# Patient Record
Sex: FEMALE | Race: WHITE | Employment: OTHER | URBAN - METROPOLITAN AREA
[De-identification: names, ages, dates, MRNs, and addresses within clinical notes are randomized per-mention and may not be internally consistent; named-entity substitution may affect disease eponyms.]

---

## 2017-01-08 DIAGNOSIS — E78.00 HYPERCHOLESTEREMIA: ICD-10-CM

## 2017-01-09 RX ORDER — ATORVASTATIN CALCIUM 10 MG/1
TABLET, FILM COATED ORAL
Qty: 30 TAB | Refills: 0 | Status: SHIPPED | OUTPATIENT
Start: 2017-01-09 | End: 2017-02-09 | Stop reason: SDUPTHER

## 2017-01-14 DIAGNOSIS — M81.0 OSTEOPOROSIS: ICD-10-CM

## 2017-01-16 RX ORDER — ALENDRONATE SODIUM 70 MG/1
TABLET ORAL
Qty: 4 TAB | Refills: 0 | Status: SHIPPED | OUTPATIENT
Start: 2017-01-16 | End: 2017-02-19 | Stop reason: SDUPTHER

## 2017-01-25 ENCOUNTER — HOSPITAL ENCOUNTER (OUTPATIENT)
Dept: LAB | Age: 67
Discharge: HOME | End: 2017-01-25

## 2017-01-25 ENCOUNTER — OFFICE VISIT (OUTPATIENT)
Dept: FAMILY MEDICINE CLINIC | Age: 67
End: 2017-01-25

## 2017-01-25 VITALS
OXYGEN SATURATION: 99 % | HEART RATE: 98 BPM | HEIGHT: 61 IN | BODY MASS INDEX: 55.32 KG/M2 | TEMPERATURE: 97.5 F | DIASTOLIC BLOOD PRESSURE: 81 MMHG | SYSTOLIC BLOOD PRESSURE: 149 MMHG | RESPIRATION RATE: 16 BRPM | WEIGHT: 293 LBS

## 2017-01-25 DIAGNOSIS — M25.512 CHRONIC LEFT SHOULDER PAIN: ICD-10-CM

## 2017-01-25 DIAGNOSIS — M81.0 OSTEOPOROSIS: ICD-10-CM

## 2017-01-25 DIAGNOSIS — I10 HTN (HYPERTENSION), BENIGN: Primary | ICD-10-CM

## 2017-01-25 DIAGNOSIS — G89.29 CHRONIC LEFT SHOULDER PAIN: ICD-10-CM

## 2017-01-25 DIAGNOSIS — R73.01 IFG (IMPAIRED FASTING GLUCOSE): ICD-10-CM

## 2017-01-25 DIAGNOSIS — F32.89 OTHER DEPRESSION: ICD-10-CM

## 2017-01-25 DIAGNOSIS — K21.9 GASTROESOPHAGEAL REFLUX DISEASE WITHOUT ESOPHAGITIS: ICD-10-CM

## 2017-01-25 DIAGNOSIS — E78.00 HYPERCHOLESTEREMIA: ICD-10-CM

## 2017-01-25 PROCEDURE — 99001 SPECIMEN HANDLING PT-LAB: CPT | Performed by: INTERNAL MEDICINE

## 2017-01-25 RX ORDER — ACETAMINOPHEN AND CODEINE PHOSPHATE 300; 30 MG/1; MG/1
1 TABLET ORAL
Qty: 40 TAB | Refills: 0 | Status: SHIPPED | OUTPATIENT
Start: 2017-01-25 | End: 2018-06-27

## 2017-01-25 NOTE — PROGRESS NOTES
Shawn Albright is a 77 y.o.  female and presents with     Chief Complaint   Patient presents with    Hypertension    Cholesterol Problem    GERD    Osteoporosis    Shoulder Pain    Wax in Ear     Pt has shoulder pain on the left side. Pt got injection in her left shoulder and that helped. Pt is taking her blood pressure and chol meds. Pt is taking meds as directed. Pt has rt hip pain  And is requesting soemting for pain. Pt does have ear wax and wants it cleaned. Past Medical History   Diagnosis Date    Degenerative disk disease     Depression     Depression     Diffuse idiopathic skeletal hyperostosis     Fracture of leg     Gallstone     Lymphedema of leg 1975     bilateral lower legs    Morbid obesity ([address]     Pulmonary arterial hypertension (HCC)     MANPREET (stress urinary incontinence, female)      Past Surgical History   Procedure Laterality Date    Hx hysterectomy      Hx orthopaedic       right shoulder and right knee replaced x 2, left knee replaced as well    Hx knee replacement       Current Outpatient Prescriptions   Medication Sig    acetaminophen-codeine (TYLENOL-CODEINE #3) 300-30 mg per tablet Take 1 Tab by mouth every six (6) hours as needed for Pain. Max Daily Amount: 4 Tabs.  alendronate (FOSAMAX) 70 mg tablet TAKE ONE TABLET BY MOUTH ONCE A WEEK ON  SUNDAY    atorvastatin (LIPITOR) 10 mg tablet TAKE ONE TABLET BY MOUTH ONCE DAILY    oxybutynin (DITROPAN) 5 mg tablet TAKE ONE TABLET BY MOUTH ONCE DAILY    ergocalciferol (ERGOCALCIFEROL) 50,000 unit capsule Take 1 Cap by mouth every seven (7) days.  furosemide (LASIX) 20 mg tablet 1 po daily    pantoprazole (PROTONIX) 40 mg tablet Take 1 Tab by mouth daily.  DULoxetine (CYMBALTA) 60 mg capsule Take 1 Cap by mouth daily.  metFORMIN (GLUCOPHAGE) 1,000 mg tablet Take 1 Tab by mouth two (2) times daily (with meals).     albuterol (PROVENTIL HFA, VENTOLIN HFA, PROAIR HFA) 90 mcg/actuation inhaler Take 2 Puffs by inhalation every six (6) hours as needed for Wheezing.  losartan (COZAAR) 25 mg tablet Take 1 Tab by mouth daily.  potassium chloride (K-DUR, KLOR-CON) 10 mEq tablet Take 1 Tab by mouth daily. No current facility-administered medications for this visit. Health Maintenance   Topic Date Due    GLAUCOMA SCREENING Q2Y  07/09/2017    Pneumococcal 65+ Low/Medium Risk (2 of 2 - PCV13) 07/26/2017    MEDICARE YEARLY EXAM  07/27/2017    BREAST CANCER SCRN MAMMOGRAM  12/29/2018    COLONOSCOPY  07/25/2023    DTaP/Tdap/Td series (2 - Td) 07/09/2025    Hepatitis C Screening  Completed    OSTEOPOROSIS SCREENING (DEXA)  Completed    ZOSTER VACCINE AGE 60>  Completed    INFLUENZA AGE 9 TO ADULT  Completed     Immunization History   Administered Date(s) Administered    Influenza High Dose Vaccine PF 10/19/2015, 08/12/2016    Pneumococcal Polysaccharide (PPSV-23) 07/09/2015, 07/26/2016    Tdap 07/09/2015     No LMP recorded. Patient has had a hysterectomy. Allergies and Intolerances: Allergies   Allergen Reactions    Adhesive Other (comments)     Skin burns - red spots    Lisinopril Other (comments)    Nitrofurantoin Other (comments)       Family History:   Family History   Problem Relation Age of Onset    Heart Disease Father     Diabetes Father        Social History:   She  reports that she has never smoked. She has never used smokeless tobacco.  She  reports that she does not drink alcohol.             Review of Systems:   General: negative for - chills, fatigue, fever, weight change  Psych: negative for - anxiety, depression, irritability or mood swings  ENT: negative for - headaches, hearing change, nasal congestion, oral lesions, sneezing or sore throat  Heme/ Lymph: negative for - bleeding problems, bruising, pallor or swollen lymph nodes  Endo: negative for - hot flashes, polydipsia/polyuria or temperature intolerance  Resp: negative for - cough, shortness of breath or wheezing  CV: negative for - chest pain, edema or palpitations  GI: negative for - abdominal pain, change in bowel habits, constipation, diarrhea or nausea/vomiting  : negative for - dysuria, hematuria, incontinence, pelvic pain or vulvar/vaginal symptoms  MSK: negative for - joint pain, joint swelling or muscle pain, pos for hip pain  Neuro: negative for - confusion, headaches, seizures or weakness  Derm: negative for - dry skin, hair changes, rash or skin lesion changes          Physical:   Vitals:   Vitals:    01/25/17 0854 01/25/17 0857   BP: 150/81 149/81   Pulse: 98    Resp: 16    Temp: 97.5 °F (36.4 °C)    TempSrc: Oral    SpO2: 99%    Weight: 312 lb (141.5 kg)    Height: 5' 1\" (1.549 m)            Exam:   HEENT- atraumatic,normocephalic, awake, oriented, well nourished, bilateral ear wax  Neck - supple,no enlarged lymph nodes, no JVD, no thyromegaly  Chest- CTA, no rhonchi, no crackles  Heart- rrr, no murmurs / gallop/rub  Abdomen- soft,BS+,NT, no hepatosplenomegaly  Ext - no c/c/edema   Neuro- no focal deficits. Power 5/5 all extremities  Skin - warm,dry, no obvious rashes. Review of Data:   LABS:   Lab Results   Component Value Date/Time    WBC 6.2 07/26/2016 08:45 AM    HGB 12.4 07/26/2016 08:45 AM    HCT 38.4 07/26/2016 08:45 AM    PLATELET 536 95/06/4445 08:45 AM     Lab Results   Component Value Date/Time    Sodium 144 07/26/2016 08:45 AM    Potassium 4.3 07/26/2016 08:45 AM    Chloride 103 07/26/2016 08:45 AM    CO2 24 07/26/2016 08:45 AM    Glucose 78 07/26/2016 08:45 AM    BUN 12 07/26/2016 08:45 AM    Creatinine 0.71 07/26/2016 08:45 AM     Lab Results   Component Value Date/Time    Cholesterol, total 169 07/26/2016 08:45 AM    HDL Cholesterol 76 07/26/2016 08:45 AM    LDL, calculated 69 07/26/2016 08:45 AM    Triglyceride 122 07/26/2016 08:45 AM     No results found for: GPT        Impression / Plan:        ICD-10-CM ICD-9-CM    1.  HTN (hypertension), benign L57 875.8 METABOLIC PANEL, COMPREHENSIVE      LIPID PANEL      CBC WITH AUTOMATED DIFF   2. Gastroesophageal reflux disease without esophagitis K21.9 530.81    3. Hypercholesteremia E78.00 272.0    4. Other depression F32.89     5. Osteoporosis M81.0 733.00 DEXA BONE DENSITY STUDY AXIAL   6. IFG (impaired fasting glucose) R73.01 790.21 HEMOGLOBIN A1C WITH EAG   7. Chronic left shoulder pain M25.512 719.41 acetaminophen-codeine (TYLENOL-CODEINE #3) 300-30 mg per tablet    G89.29 338.29      Bilateral ear wax -  Will do ear lavage next visit. HTN/GERD / hyperchol - stable    Explained to patient risk benefits of the medications. Advised patient to stop meds if having any side effects. Pt verbalized understanding of the instructions. I have discussed the diagnosis with the patient and the intended plan as seen in the above orders. The patient has received an after-visit summary and questions were answered concerning future plans. I have discussed medication side effects and warnings with the patient as well. I have reviewed the plan of care with the patient, accepted their input and they are in agreement with the treatment goals. Reviewed plan of care. Patient has provided input and agrees with goals.     Follow-up Disposition: Not on Carlos Murrieta MD

## 2017-01-25 NOTE — PROGRESS NOTES
1. Have you been to the ER, urgent care clinic since your last visit? Hospitalized since your last visit? No    2. Have you seen or consulted any other health care providers outside of the [address] [place] [address] since your last visit? Include any pap smears or colon screening.  No

## 2017-01-25 NOTE — MR AVS SNAPSHOT
Visit Information Date & Time Provider Department Dept. Phone Encounter #  
 1/25/2017  9:00 AM [id number], [address] [facility] [phone] [id number] Follow-up Instructions Return in about 1 week (around 2/1/2017) for ear lavage. Upcoming Health Maintenance Date Due  
 GLAUCOMA SCREENING Q2Y 7/9/2017 Pneumococcal 65+ Low/Medium Risk (2 of 2 - PCV13) 7/26/2017 MEDICARE YEARLY EXAM 7/27/2017 BREAST CANCER SCRN MAMMOGRAM 12/29/2018 COLONOSCOPY 7/25/2023 DTaP/Tdap/Td series (2 - Td) 7/9/2025 Allergies as of 1/25/2017  Review Complete On: 1/25/2017 By: Mali Abernathy MD  
  
 Severity Noted Reaction Type Reactions Adhesive  11/24/2015    Other (comments) Skin burns - red spots Lisinopril  07/09/2015    Other (comments) Nitrofurantoin  05/13/2016    Other (comments) Current Immunizations  Never Reviewed Name Date Influenza High Dose Vaccine PF 8/12/2016, 10/19/2015 Pneumococcal Polysaccharide (PPSV-23) 7/26/2016  8:30 AM, 7/9/2015  9:00 AM  
 Tdap 7/9/2015  9:00 AM  
  
 Not reviewed this visit You Were Diagnosed With   
  
 Codes Comments HTN (hypertension), benign    -  Primary ICD-10-CM: I10 
ICD-9-CM: 401.1 Gastroesophageal reflux disease without esophagitis     ICD-10-CM: K21.9 ICD-9-CM: 530.81 Hypercholesteremia     ICD-10-CM: E78.00 ICD-9-CM: 272.0 Other depression     ICD-10-CM: F32.89 Osteoporosis     ICD-10-CM: M81.0 ICD-9-CM: 733.00 IFG (impaired fasting glucose)     ICD-10-CM: R73.01 
ICD-9-CM: 790.21 Chronic left shoulder pain     ICD-10-CM: M25.512, G89.29 ICD-9-CM: 719.41, 338.29 Vitals BP Pulse Temp Resp Height(growth percentile) Weight(growth percentile) 149/81 (BP 1 Location: Left arm, BP Patient Position: Sitting) 98 97.5 °F (36.4 °C) (Oral) 16 5' 1\" (1.549 m) 312 lb (141.5 kg) SpO2 BMI OB Status Smoking Status 99% 58.95 kg/m2 Hysterectomy Never Smoker Vitals History BMI and BSA Data Body Mass Index Body Surface Area 58.95 kg/m 2 2.47 m 2 Preferred Pharmacy Pharmacy Name [facility] [address], [address] [address] [phone] Your Updated Medication List  
  
   
This list is accurate as of: 1/25/17  9:40 AM.  Always use your most recent med list.  
  
  
  
  
 acetaminophen-codeine 300-30 mg per tablet Commonly known as:  TYLENOL-CODEINE #3 Take 1 Tab by mouth every six (6) hours as needed for Pain. Max Daily Amount: 4 Tabs. albuterol 90 mcg/actuation inhaler Commonly known as:  PROVENTIL HFA, VENTOLIN HFA, PROAIR HFA Take 2 Puffs by inhalation every six (6) hours as needed for Wheezing. alendronate 70 mg tablet Commonly known as:  FOSAMAX TAKE ONE TABLET BY MOUTH ONCE A WEEK ON  SUNDAY  
  
 atorvastatin 10 mg tablet Commonly known as:  LIPITOR  
TAKE ONE TABLET BY MOUTH ONCE DAILY DULoxetine 60 mg capsule Commonly known as:  CYMBALTA Take 1 Cap by mouth daily. ergocalciferol 50,000 unit capsule Commonly known as:  ERGOCALCIFEROL Take 1 Cap by mouth every seven (7) days. furosemide 20 mg tablet Commonly known as:  LASIX  
1 po daily  
  
 losartan 25 mg tablet Commonly known as:  COZAAR Take 1 Tab by mouth daily. metFORMIN 1,000 mg tablet Commonly known as:  GLUCOPHAGE Take 1 Tab by mouth two (2) times daily (with meals). oxybutynin 5 mg tablet Commonly known as:  DITROPAN  
TAKE ONE TABLET BY MOUTH ONCE DAILY pantoprazole 40 mg tablet Commonly known as:  PROTONIX Take 1 Tab by mouth daily. potassium chloride 10 mEq tablet Commonly known as:  K-DUR, KLOR-CON Take 1 Tab by mouth daily. Prescriptions Printed  Refills  
 acetaminophen-codeine (TYLENOL-CODEINE #3) 300-30 mg per tablet 0  
 Sig: Take 1 Tab by mouth every six (6) hours as needed for Pain. Max Daily Amount: 4 Tabs. Class: Print Route: Oral  
  
Follow-up Instructions Return in about 1 week (around 2/1/2017) for ear lavage. To-Do List   
 01/25/2017 Lab:  CBC WITH AUTOMATED DIFF   
  
 01/25/2017 Imaging:  DEXA BONE DENSITY STUDY AXIAL   
  
 01/25/2017 Lab:  HEMOGLOBIN A1C WITH EAG   
  
 01/25/2017 Lab:  LIPID PANEL   
  
 01/25/2017 Lab:  METABOLIC PANEL, COMPREHENSIVE [facility] SERVICES! Dear Kashmir Harris: 
Thank you for requesting a [employer] account. Our records indicate that you already have an active [employer] account. You can access your account anytime at [email or web address] Did you know that you can access your hospital and ER discharge instructions at any time in [employer]? You can also review all of your test results from your hospital stay or ER visit. Additional Information If you have questions, please visit the Frequently Asked Questions section of the [employer] website at [email or web address] Remember, [employer] is NOT to be used for urgent needs. For medical emergencies, dial 911. Now available from your iPhone and Android! Please provide this summary of care documentation to your next provider. Your primary care clinician is listed as Jasen Caba. If you have any questions after today's visit, please call [phone].

## 2017-01-26 LAB
ALBUMIN SERPL-MCNC: 4.2 G/DL (ref 3.6–4.8)
ALBUMIN/GLOB SERPL: 1.7 {RATIO} (ref 1.1–2.5)
ALP SERPL-CCNC: 78 IU/L (ref 39–117)
ALT SERPL-CCNC: 13 IU/L (ref 0–32)
AST SERPL-CCNC: 21 IU/L (ref 0–40)
BASOPHILS # BLD AUTO: 0 X10E3/UL (ref 0–0.2)
BASOPHILS NFR BLD AUTO: 1 %
BILIRUB SERPL-MCNC: 0.9 MG/DL (ref 0–1.2)
BUN SERPL-MCNC: 11 MG/DL (ref 8–27)
BUN/CREAT SERPL: 16 (ref 11–26)
CALCIUM SERPL-MCNC: 9.7 MG/DL (ref 8.7–10.3)
CHLORIDE SERPL-SCNC: 100 MMOL/L (ref 96–106)
CHOLEST SERPL-MCNC: 179 MG/DL (ref 100–199)
CO2 SERPL-SCNC: 24 MMOL/L (ref 18–29)
CREAT SERPL-MCNC: 0.7 MG/DL (ref 0.57–1)
EOSINOPHIL # BLD AUTO: 0.1 X10E3/UL (ref 0–0.4)
EOSINOPHIL NFR BLD AUTO: 2 %
ERYTHROCYTE [DISTWIDTH] IN BLOOD BY AUTOMATED COUNT: 14.8 % (ref 12.3–15.4)
EST. AVERAGE GLUCOSE BLD GHB EST-MCNC: 117 MG/DL
GLOBULIN SER CALC-MCNC: 2.5 G/DL (ref 1.5–4.5)
GLUCOSE SERPL-MCNC: 75 MG/DL (ref 65–99)
HBA1C MFR BLD: 5.7 % (ref 4.8–5.6)
HCT VFR BLD AUTO: 40.2 % (ref 34–46.6)
HDLC SERPL-MCNC: 75 MG/DL
HGB BLD-MCNC: 13.3 G/DL (ref 11.1–15.9)
IMM GRANULOCYTES # BLD: 0 X10E3/UL (ref 0–0.1)
IMM GRANULOCYTES NFR BLD: 0 %
INTERPRETATION, 910389: NORMAL
LDLC SERPL CALC-MCNC: 78 MG/DL (ref 0–99)
LYMPHOCYTES # BLD AUTO: 1.1 X10E3/UL (ref 0.7–3.1)
LYMPHOCYTES NFR BLD AUTO: 21 %
MCH RBC QN AUTO: 29.9 PG (ref 26.6–33)
MCHC RBC AUTO-ENTMCNC: 33.1 G/DL (ref 31.5–35.7)
MCV RBC AUTO: 90 FL (ref 79–97)
MONOCYTES # BLD AUTO: 0.6 X10E3/UL (ref 0.1–0.9)
MONOCYTES NFR BLD AUTO: 10 %
NEUTROPHILS # BLD AUTO: 3.5 X10E3/UL (ref 1.4–7)
NEUTROPHILS NFR BLD AUTO: 66 %
PLATELET # BLD AUTO: 227 X10E3/UL (ref 150–379)
POTASSIUM SERPL-SCNC: 4.5 MMOL/L (ref 3.5–5.2)
PROT SERPL-MCNC: 6.7 G/DL (ref 6–8.5)
RBC # BLD AUTO: 4.45 X10E6/UL (ref 3.77–5.28)
SODIUM SERPL-SCNC: 144 MMOL/L (ref 134–144)
TRIGL SERPL-MCNC: 130 MG/DL (ref 0–149)
VLDLC SERPL CALC-MCNC: 26 MG/DL (ref 5–40)
WBC # BLD AUTO: 5.3 X10E3/UL (ref 3.4–10.8)

## 2017-02-01 ENCOUNTER — OFFICE VISIT (OUTPATIENT)
Dept: FAMILY MEDICINE CLINIC | Age: 67
End: 2017-02-01

## 2017-02-01 VITALS
WEIGHT: 293 LBS | HEIGHT: 61 IN | TEMPERATURE: 96.8 F | SYSTOLIC BLOOD PRESSURE: 141 MMHG | RESPIRATION RATE: 18 BRPM | HEART RATE: 63 BPM | OXYGEN SATURATION: 98 % | DIASTOLIC BLOOD PRESSURE: 74 MMHG | BODY MASS INDEX: 55.32 KG/M2

## 2017-02-01 DIAGNOSIS — H61.23 EXCESSIVE EAR WAX, BILATERAL: Primary | ICD-10-CM

## 2017-02-01 DIAGNOSIS — M79.89 LEG SWELLING: Primary | ICD-10-CM

## 2017-02-01 RX ORDER — FUROSEMIDE 40 MG/1
TABLET ORAL
Qty: 30 TAB | Refills: 3 | Status: SHIPPED | OUTPATIENT
Start: 2017-02-01 | End: 2019-05-16 | Stop reason: SDUPTHER

## 2017-02-01 NOTE — PROGRESS NOTES
Araceli Brand is a 79 y.o.  female and presents with     Chief Complaint   Patient presents with    Wax in Ear     Pt goes to Audiology. Pt was asked to get her ears flushed. Pt uses hearing aids. Past Medical History   Diagnosis Date    Degenerative disk disease     Depression     Depression     Diffuse idiopathic skeletal hyperostosis     Fracture of leg     Gallstone     Lymphedema of leg 1975     bilateral lower legs    Morbid obesity ([address]     Pulmonary arterial hypertension (HCC)     MANPREET (stress urinary incontinence, female)      Past Surgical History   Procedure Laterality Date    Hx hysterectomy      Hx orthopaedic       right shoulder and right knee replaced x 2, left knee replaced as well    Hx knee replacement       Current Outpatient Prescriptions   Medication Sig    alendronate (FOSAMAX) 70 mg tablet TAKE ONE TABLET BY MOUTH ONCE A WEEK ON  SUNDAY    atorvastatin (LIPITOR) 10 mg tablet TAKE ONE TABLET BY MOUTH ONCE DAILY    potassium chloride (K-DUR, KLOR-CON) 10 mEq tablet Take 1 Tab by mouth daily.  oxybutynin (DITROPAN) 5 mg tablet TAKE ONE TABLET BY MOUTH ONCE DAILY    ergocalciferol (ERGOCALCIFEROL) 50,000 unit capsule Take 1 Cap by mouth every seven (7) days.  furosemide (LASIX) 20 mg tablet 1 po daily    pantoprazole (PROTONIX) 40 mg tablet Take 1 Tab by mouth daily.  DULoxetine (CYMBALTA) 60 mg capsule Take 1 Cap by mouth daily.  metFORMIN (GLUCOPHAGE) 1,000 mg tablet Take 1 Tab by mouth two (2) times daily (with meals).  albuterol (PROVENTIL HFA, VENTOLIN HFA, PROAIR HFA) 90 mcg/actuation inhaler Take 2 Puffs by inhalation every six (6) hours as needed for Wheezing.  losartan (COZAAR) 25 mg tablet Take 1 Tab by mouth daily.  acetaminophen-codeine (TYLENOL-CODEINE #3) 300-30 mg per tablet Take 1 Tab by mouth every six (6) hours as needed for Pain. Max Daily Amount: 4 Tabs. No current facility-administered medications for this visit. Health Maintenance   Topic Date Due    GLAUCOMA SCREENING Q2Y  07/09/2017    Pneumococcal 65+ Low/Medium Risk (2 of 2 - PCV13) 07/26/2017    MEDICARE YEARLY EXAM  07/27/2017    BREAST CANCER SCRN MAMMOGRAM  12/29/2018    COLONOSCOPY  07/25/2023    DTaP/Tdap/Td series (2 - Td) 07/09/2025    Hepatitis C Screening  Completed    OSTEOPOROSIS SCREENING (DEXA)  Completed    ZOSTER VACCINE AGE 60>  Completed    INFLUENZA AGE 9 TO ADULT  Completed     Immunization History   Administered Date(s) Administered    Influenza High Dose Vaccine PF 10/19/2015, 08/12/2016    Pneumococcal Polysaccharide (PPSV-23) 07/09/2015, 07/26/2016    Tdap 07/09/2015     No LMP recorded. Patient has had a hysterectomy. Allergies and Intolerances: Allergies   Allergen Reactions    Adhesive Other (comments)     Skin burns - red spots    Lisinopril Other (comments)    Nitrofurantoin Other (comments)       Family History:   Family History   Problem Relation Age of Onset    Heart Disease Father     Diabetes Father        Social History:   She  reports that she has never smoked. She has never used smokeless tobacco.  She  reports that she does not drink alcohol.             Review of Systems: pos for ear wax  General: negative for - chills, fatigue, fever, weight change  Psych: negative for - anxiety, depression, irritability or mood swings  ENT: negative for - headaches, hearing change, nasal congestion, oral lesions, sneezing or sore throat  Heme/ Lymph: negative for - bleeding problems, bruising, pallor or swollen lymph nodes  Endo: negative for - hot flashes, polydipsia/polyuria or temperature intolerance  Resp: negative for - cough, shortness of breath or wheezing  CV: negative for - chest pain, edema or palpitations  GI: negative for - abdominal pain, change in bowel habits, constipation, diarrhea or nausea/vomiting  : negative for - dysuria, hematuria, incontinence, pelvic pain or vulvar/vaginal symptoms  MSK: negative for - joint pain, joint swelling or muscle pain  Neuro: negative for - confusion, headaches, seizures or weakness  Derm: negative for - dry skin, hair changes, rash or skin lesion changes          Physical:   Vitals:   Vitals:    02/01/17 0814   BP: 141/74   Pulse: 63   Resp: 18   Temp: 96.8 °F (36 °C)   TempSrc: Oral   SpO2: 98%   Weight: 311 lb (141.1 kg)   Height: 5' 1\" (1.549 m)           Exam:   HEENT- atraumatic,normocephalic, awake, oriented, well nourished, some bilateral ear wax  Neck - supple,no enlarged lymph nodes, no JVD, no thyromegaly  Chest- CTA, no rhonchi, no crackles  Heart- rrr, no murmurs / gallop/rub  Abdomen- soft,BS+,NT, no hepatosplenomegaly  Ext - no c/c/edema   Neuro- no focal deficits. Power 5/5 all extremities  Skin - warm,dry, no obvious rashes. Review of Data:   LABS:   Lab Results   Component Value Date/Time    WBC 5.3 01/25/2017 09:59 AM    HGB 13.3 01/25/2017 09:59 AM    HCT 40.2 01/25/2017 09:59 AM    PLATELET 244 89/01/1375 09:59 AM     Lab Results   Component Value Date/Time    Sodium 144 01/25/2017 09:59 AM    Potassium 4.5 01/25/2017 09:59 AM    Chloride 100 01/25/2017 09:59 AM    CO2 24 01/25/2017 09:59 AM    Glucose 75 01/25/2017 09:59 AM    BUN 11 01/25/2017 09:59 AM    Creatinine 0.70 01/25/2017 09:59 AM     Lab Results   Component Value Date/Time    Cholesterol, total 179 01/25/2017 09:59 AM    HDL Cholesterol 75 01/25/2017 09:59 AM    LDL, calculated 78 01/25/2017 09:59 AM    Triglyceride 130 01/25/2017 09:59 AM     No results found for: GPT        Impression / Plan:        ICD-10-CM ICD-9-CM    1. Excessive ear wax, bilateral H61.23 380.4 REMOVE IMPACTED EAR WAX     After obtaining informed consent both ear were flushed with luke warm water and peroxide. . Small amounts of wax was flushed from both ears. Pt has scar tissue on both ear drums. Explained to patient risk benefits of the medications. Advised patient to stop meds if having any side effects. Pt verbalized understanding of the instructions. I have discussed the diagnosis with the patient and the intended plan as seen in the above orders. The patient has received an after-visit summary and questions were answered concerning future plans. I have discussed medication side effects and warnings with the patient as well. I have reviewed the plan of care with the patient, accepted their input and they are in agreement with the treatment goals. Reviewed plan of care. Patient has provided input and agrees with goals.     Follow-up Disposition: Not on Jonathan Boateng MD

## 2017-02-01 NOTE — PROGRESS NOTES
Patient presents to clinic for ear lavage. Patient states that she thinks her legs have been swelling more than usual.     Advance Directive:    1. Do you have an advance directive in place? Patient Reply:     2. If not, would you like material regarding how to put one in place? Patient Reply:      Coordination of Care:    1. Have you been to the ER, urgent care clinic since your last visit? Hospitalized since your last visit? No    2. Have you seen or consulted any other health care providers outside of the [address] [place] [address] since your last visit? Include any pap smears or colon screening. No     Depression Screening completed. Learning Assessment completed. Abuse Screening completed. Health Maintenance reviewed and discussed per provider.

## 2017-02-01 NOTE — MR AVS SNAPSHOT
Visit Information Date & Time Provider Department Dept. Phone Encounter #  
 2/1/2017  8:15 AM Neela Edwards, [address] [facility] [phone] [id number] Follow-up Instructions Return in about 3 months (around 5/1/2017). Upcoming Health Maintenance Date Due  
 GLAUCOMA SCREENING Q2Y 7/9/2017 Pneumococcal 65+ Low/Medium Risk (2 of 2 - PCV13) 7/26/2017 MEDICARE YEARLY EXAM 7/27/2017 BREAST CANCER SCRN MAMMOGRAM 12/29/2018 COLONOSCOPY 7/25/2023 DTaP/Tdap/Td series (2 - Td) 7/9/2025 Allergies as of 2/1/2017  Review Complete On: 2/1/2017 By: Neela Edwards MD  
  
 Severity Noted Reaction Type Reactions Adhesive  11/24/2015    Other (comments) Skin burns - red spots Lisinopril  07/09/2015    Other (comments) Nitrofurantoin  05/13/2016    Other (comments) Current Immunizations  Never Reviewed Name Date Influenza High Dose Vaccine PF 8/12/2016, 10/19/2015 Pneumococcal Polysaccharide (PPSV-23) 7/26/2016  8:30 AM, 7/9/2015  9:00 AM  
 Tdap 7/9/2015  9:00 AM  
  
 Not reviewed this visit You Were Diagnosed With   
  
 Codes Comments Excessive ear wax, bilateral    -  Primary ICD-10-CM: H61.23 
ICD-9-CM: 380.4 Vitals BP Pulse Temp Resp Height(growth percentile) Weight(growth percentile) 141/74 (BP 1 Location: Left arm, BP Patient Position: Sitting) 63 96.8 °F (36 °C) (Oral) 18 5' 1\" (1.549 m) 311 lb (141.1 kg) SpO2 BMI OB Status Smoking Status 98% 58.76 kg/m2 Hysterectomy Never Smoker Vitals History BMI and BSA Data Body Mass Index Body Surface Area 58.76 kg/m 2 2.46 m 2 Preferred Pharmacy Pharmacy Name Phone [facility] [address], [place] [address] [phone] Your Updated Medication List  
  
   
This list is accurate as of: 2/1/17  8:58 AM.  Always use your most recent med list.  
  
  
  
  
 acetaminophen-codeine 300-30 mg per tablet Commonly known as:  TYLENOL-CODEINE #3 Take 1 Tab by mouth every six (6) hours as needed for Pain. Max Daily Amount: 4 Tabs. albuterol 90 mcg/actuation inhaler Commonly known as:  PROVENTIL HFA, VENTOLIN HFA, PROAIR HFA Take 2 Puffs by inhalation every six (6) hours as needed for Wheezing. alendronate 70 mg tablet Commonly known as:  FOSAMAX TAKE ONE TABLET BY MOUTH ONCE A WEEK ON  SUNDAY  
  
 atorvastatin 10 mg tablet Commonly known as:  LIPITOR  
TAKE ONE TABLET BY MOUTH ONCE DAILY DULoxetine 60 mg capsule Commonly known as:  CYMBALTA Take 1 Cap by mouth daily. ergocalciferol 50,000 unit capsule Commonly known as:  ERGOCALCIFEROL Take 1 Cap by mouth every seven (7) days. furosemide 20 mg tablet Commonly known as:  LASIX  
1 po daily  
  
 losartan 25 mg tablet Commonly known as:  COZAAR Take 1 Tab by mouth daily. metFORMIN 1,000 mg tablet Commonly known as:  GLUCOPHAGE Take 1 Tab by mouth two (2) times daily (with meals). oxybutynin 5 mg tablet Commonly known as:  DITROPAN  
TAKE ONE TABLET BY MOUTH ONCE DAILY pantoprazole 40 mg tablet Commonly known as:  PROTONIX Take 1 Tab by mouth daily. potassium chloride 10 mEq tablet Commonly known as:  K-DUR, KLOR-CON Take 1 Tab by mouth daily. We Performed the Following REMOVE IMPACTED EAR WAX [68320 CPT(R)] Follow-up Instructions Return in about 3 months (around 5/1/2017). To-Do List   
 02/03/2017 2:30 PM  
  Appointment with [facility] BONE DENSITY RM 1 at [address] ([phone]) OUTSIDE FILMS  - Any outside films related to the study being scheduled should be brought with you on the day of the exam.  If this cannot be done there may be a delay in the reading of the study.   MEDICATIONS  - Patient must bring a complete list of all medications currently taking to include prescriptions, over-the-counter meds, herbals, vitamins & any dietary supplements - Patient must discontinue use of calcium, vitamins, or calcium supplements including antacids (calcium based) 24 hours before scan. CHECK IN INSTRUCTIONS  For DEXA/Bone Density Studies:  Check in to the [facility] 15 minutes prior to your appointment. [facility] [address] [place] [address], [facility] SERVICES! Dear Carrie George: 
Thank you for requesting a [employer] account. Our records indicate that you already have an active [employer] account. You can access your account anytime at [email or web address] Did you know that you can access your hospital and ER discharge instructions at any time in [employer]? You can also review all of your test results from your hospital stay or ER visit. Additional Information If you have questions, please visit the Frequently Asked Questions section of the [employer] website at [email or web address] Remember, [employer] is NOT to be used for urgent needs. For medical emergencies, dial 911. Now available from your iPhone and Android! Please provide this summary of care documentation to your next provider. Your primary care clinician is listed as Michelle Aguilera. If you have any questions after today's visit, please call [phone].

## 2017-02-03 ENCOUNTER — HOSPITAL ENCOUNTER (OUTPATIENT)
Dept: GENERAL RADIOLOGY | Age: 67
Discharge: HOME | End: 2017-02-03
Attending: INTERNAL MEDICINE
Payer: MEDICARE

## 2017-02-03 DIAGNOSIS — M81.0 OSTEOPOROSIS: ICD-10-CM

## 2017-02-03 PROCEDURE — 77080 DXA BONE DENSITY AXIAL: CPT

## 2017-02-09 DIAGNOSIS — E78.00 HYPERCHOLESTEREMIA: ICD-10-CM

## 2017-02-09 RX ORDER — ATORVASTATIN CALCIUM 10 MG/1
TABLET, FILM COATED ORAL
Qty: 30 TAB | Refills: 0 | Status: SHIPPED | OUTPATIENT
Start: 2017-02-09 | End: 2017-03-17 | Stop reason: SDUPTHER

## 2017-02-10 DIAGNOSIS — E78.00 HYPERCHOLESTEREMIA: ICD-10-CM

## 2017-02-12 RX ORDER — ATORVASTATIN CALCIUM 10 MG/1
TABLET, FILM COATED ORAL
Qty: 30 TAB | Refills: 0 | Status: SHIPPED | OUTPATIENT
Start: 2017-02-12 | End: 2017-04-13 | Stop reason: SDUPTHER

## 2017-02-17 DIAGNOSIS — I10 ESSENTIAL HYPERTENSION: ICD-10-CM

## 2017-02-17 NOTE — TELEPHONE ENCOUNTER
Requested Prescriptions     Pending Prescriptions Disp Refills    losartan (COZAAR) 25 mg tablet 30 Tab 3     Sig: Take 1 Tab by mouth daily.

## 2017-02-19 DIAGNOSIS — N39.41 URGE INCONTINENCE OF URINE: ICD-10-CM

## 2017-02-19 DIAGNOSIS — M81.0 OSTEOPOROSIS: ICD-10-CM

## 2017-02-19 DIAGNOSIS — I10 ESSENTIAL HYPERTENSION: ICD-10-CM

## 2017-02-19 RX ORDER — ALENDRONATE SODIUM 70 MG/1
TABLET ORAL
Qty: 4 TAB | Refills: 0 | Status: SHIPPED | OUTPATIENT
Start: 2017-02-19 | End: 2017-03-26 | Stop reason: SDUPTHER

## 2017-02-19 RX ORDER — OXYBUTYNIN CHLORIDE 5 MG/1
TABLET ORAL
Qty: 90 TAB | Refills: 0 | Status: SHIPPED | OUTPATIENT
Start: 2017-02-19 | End: 2017-04-13 | Stop reason: SDUPTHER

## 2017-02-19 RX ORDER — LOSARTAN POTASSIUM 25 MG/1
25 TABLET ORAL DAILY
Qty: 30 TAB | Refills: 3 | Status: SHIPPED | OUTPATIENT
Start: 2017-02-19 | End: 2017-04-21 | Stop reason: SDUPTHER

## 2017-02-19 RX ORDER — LOSARTAN POTASSIUM 25 MG/1
TABLET ORAL
Qty: 90 TAB | Refills: 0 | Status: SHIPPED | OUTPATIENT
Start: 2017-02-19 | End: 2019-05-16 | Stop reason: SDUPTHER

## 2017-03-17 DIAGNOSIS — E78.00 HYPERCHOLESTEREMIA: ICD-10-CM

## 2017-03-17 RX ORDER — ATORVASTATIN CALCIUM 10 MG/1
10 TABLET, FILM COATED ORAL DAILY
Qty: 30 TAB | Refills: 3 | Status: SHIPPED | OUTPATIENT
Start: 2017-03-17 | End: 2017-04-13 | Stop reason: SDUPTHER

## 2017-03-26 DIAGNOSIS — M81.0 OSTEOPOROSIS: ICD-10-CM

## 2017-03-27 RX ORDER — ALENDRONATE SODIUM 70 MG/1
TABLET ORAL
Qty: 4 TAB | Refills: 0 | Status: SHIPPED | OUTPATIENT
Start: 2017-03-27 | End: 2017-10-23 | Stop reason: SDUPTHER

## 2017-03-28 ENCOUNTER — TELEPHONE (OUTPATIENT)
Dept: FAMILY MEDICINE CLINIC | Age: 67
End: 2017-03-28

## 2017-03-28 DIAGNOSIS — N39.0 URINARY TRACT INFECTION WITHOUT HEMATURIA, SITE UNSPECIFIED: Primary | ICD-10-CM

## 2017-03-28 RX ORDER — AMOXICILLIN AND CLAVULANATE POTASSIUM 875; 125 MG/1; MG/1
1 TABLET, FILM COATED ORAL 2 TIMES DAILY
Qty: 10 TAB | Refills: 0 | Status: SHIPPED | OUTPATIENT
Start: 2017-03-28 | End: 2017-04-07

## 2017-03-28 NOTE — TELEPHONE ENCOUNTER
Advised pt that medication called into pharmacy, if symptoms does not improve to drop off urine specimen. Pt verbalized understanding. Pt wanted to let Dr. Tania West that she appreciates all that he do. This encounter will be closed.

## 2017-03-28 NOTE — TELEPHONE ENCOUNTER
Will send Augmentin to pharmacy, If symptoms do not improve ,s he may have to drop off urine specimen.

## 2017-03-28 NOTE — TELEPHONE ENCOUNTER
Spoke with pt, pt is requesting medication for a UTI, pt states it hurts when she uses the bathroom and burning upon urination. Pt is requesting medication for UTI. She has been dealing with this for 2 weeks. Please advise.

## 2017-04-12 ENCOUNTER — HOSPITAL ENCOUNTER (OUTPATIENT)
Dept: LAB | Age: 67
Discharge: HOME | End: 2017-04-12
Payer: MEDICARE

## 2017-04-12 ENCOUNTER — OFFICE VISIT (OUTPATIENT)
Dept: FAMILY MEDICINE CLINIC | Age: 67
End: 2017-04-12

## 2017-04-12 VITALS
TEMPERATURE: 97.6 F | SYSTOLIC BLOOD PRESSURE: 136 MMHG | HEIGHT: 61 IN | WEIGHT: 293 LBS | OXYGEN SATURATION: 96 % | HEART RATE: 79 BPM | BODY MASS INDEX: 55.32 KG/M2 | RESPIRATION RATE: 16 BRPM | DIASTOLIC BLOOD PRESSURE: 86 MMHG

## 2017-04-12 DIAGNOSIS — R30.0 BURNING WITH URINATION: ICD-10-CM

## 2017-04-12 DIAGNOSIS — R35.0 URINARY FREQUENCY: ICD-10-CM

## 2017-04-12 DIAGNOSIS — N34.2 INFECTIVE URETHRITIS: ICD-10-CM

## 2017-04-12 DIAGNOSIS — N34.2 INFECTIVE URETHRITIS: Primary | ICD-10-CM

## 2017-04-12 LAB
BILIRUB UR QL STRIP: NEGATIVE
GLUCOSE UR-MCNC: NEGATIVE MG/DL
KETONES P FAST UR STRIP-MCNC: NEGATIVE MG/DL
PH UR STRIP: 5.5 [PH] (ref 4.6–8)
PROT UR QL STRIP: NEGATIVE MG/DL
SP GR UR STRIP: 1.01 (ref 1–1.03)
UA UROBILINOGEN AMB POC: NORMAL (ref 0.2–1)
URINALYSIS CLARITY POC: CLEAR
URINALYSIS COLOR POC: YELLOW
URINE BLOOD POC: NEGATIVE
URINE LEUKOCYTES POC: NORMAL
URINE NITRITES POC: NEGATIVE

## 2017-04-12 PROCEDURE — 87186 SC STD MICRODIL/AGAR DIL: CPT | Performed by: INTERNAL MEDICINE

## 2017-04-12 PROCEDURE — 87077 CULTURE AEROBIC IDENTIFY: CPT | Performed by: INTERNAL MEDICINE

## 2017-04-12 PROCEDURE — 87086 URINE CULTURE/COLONY COUNT: CPT | Performed by: INTERNAL MEDICINE

## 2017-04-12 RX ORDER — AMOXICILLIN AND CLAVULANATE POTASSIUM 875; 125 MG/1; MG/1
1 TABLET, FILM COATED ORAL EVERY 12 HOURS
Qty: 14 TAB | Refills: 0 | Status: SHIPPED | OUTPATIENT
Start: 2017-04-12 | End: 2017-04-19

## 2017-04-12 NOTE — PROGRESS NOTES
History of Present Illness  Sosa Suarez is a 79 y.o. female who presents today for management of    Chief Complaint   Patient presents with    Urinary Frequency       Patient complains of burning urination and urinary frequency for about 3 weeks. She finished a 10-day course of Augmentin as prescribed by her PCP. She feels better but has persistent symptoms. She denies having fever, vomiting, abdominal pain, hematuria, increased leg swelling. She has long-standing urinary incontinence. Problem List  Patient Active Problem List    Diagnosis Date Noted    Gastroesophageal reflux disease without esophagitis 10/26/2016    Advance care planning 10/03/2016    HTN (hypertension), benign 04/19/2016    Morbid obesity due to excess calories ([facility] [address] 04/19/2016    Epulis 01/19/2016    Urinary incontinence 11/24/2015    Cataract 11/19/2015    Sjogrens syndrome ([facility] [address] [facility] 03/18/2015    Leg swelling 03/18/2015    Pulmonary HTN ([facility] [address] [facility] 11/26/2014    Snoring 11/26/2014    Morbid obesity ([facility] [address] 11/26/2014    Stress incontinence 11/26/2014    Depression 11/26/2014    Impaired hearing 11/26/2014       Current Medications  Current Outpatient Prescriptions on File Prior to Visit   Medication Sig Dispense Refill    alendronate (FOSAMAX) 70 mg tablet TAKE ONE TABLET BY MOUTH EVERY SUNDAY 4 Tab 0    atorvastatin (LIPITOR) 10 mg tablet Take 1 Tab by mouth daily. 30 Tab 3    losartan (COZAAR) 25 mg tablet Take 1 Tab by mouth daily. 30 Tab 3    oxybutynin (DITROPAN) 5 mg tablet TAKE ONE TABLET BY MOUTH ONCE DAILY 90 Tab 0    losartan (COZAAR) 25 mg tablet TAKE ONE TABLET BY MOUTH ONCE DAILY 90 Tab 0    atorvastatin (LIPITOR) 10 mg tablet TAKE ONE TABLET BY MOUTH ONCE DAILY 30 Tab 0    furosemide (LASIX) 40 mg tablet One po daily 30 Tab 3    acetaminophen-codeine (TYLENOL-CODEINE #3) 300-30 mg per tablet Take 1 Tab by mouth every six (6) hours as needed for Pain. Max Daily Amount: 4 Tabs.  40 Tab 0    potassium chloride (K-DUR, KLOR-CON) 10 mEq tablet Take 1 Tab by mouth daily. 90 Tab 1    ergocalciferol (ERGOCALCIFEROL) 50,000 unit capsule Take 1 Cap by mouth every seven (7) days. 4 Cap 12    pantoprazole (PROTONIX) 40 mg tablet Take 1 Tab by mouth daily. 90 Tab 3    DULoxetine (CYMBALTA) 60 mg capsule Take 1 Cap by mouth daily. 90 Cap 3    metFORMIN (GLUCOPHAGE) 1,000 mg tablet Take 1 Tab by mouth two (2) times daily (with meals). 180 Tab 1    albuterol (PROVENTIL HFA, VENTOLIN HFA, PROAIR HFA) 90 mcg/actuation inhaler Take 2 Puffs by inhalation every six (6) hours as needed for Wheezing. 1 Inhaler 3     No current facility-administered medications on file prior to visit.         Allergies/Drug Reactions  Allergies   Allergen Reactions    Adhesive Other (comments)     Skin burns - red spots    Lisinopril Other (comments)    Nitrofurantoin Other (comments)        Review of Systems  Negative except mentioned in HPI      Physical Exam  Vital signs:   Vitals:    04/12/17 1348   BP: 136/86   Pulse: 79   Resp: 16   Temp: 97.6 °F (36.4 °C)   TempSrc: Oral   SpO2: 96%   Weight: 310 lb 12.8 oz (141 kg)   Height: 5' 1\" (1.549 m)       General: alert, oriented, not in distress  Chest/Lungs: clear breath sounds, no wheezing or crackles  Heart: normal rate, regular rhythm, no murmur  Abdomen: soft, non-distended, non-tender, normal bowel sounds, no organomegaly, no masses  Extremities: no focal deformities, no edema    Laboratory/Tests:  Results for orders placed or performed in visit on 04/12/17   AMB POC URINALYSIS DIP STICK AUTO W/O MICRO     Status: Normal   Result Value Ref Range Status    Color (UA POC) Yellow  Final    Clarity (UA POC) Clear  Final    Glucose (UA POC) Negative Negative Final    Bilirubin (UA POC) Negative Negative Final    Ketones (UA POC) Negative Negative Final    Specific gravity (UA POC) 1.015 1.001 - 1.035 Final    Blood (UA POC) Negative Negative Final    pH (UA POC) 5.5 4.6 - 8.0 Final    Protein (UA POC) Negative Negative mg/dL Final    Urobilinogen (UA POC) 0.2 mg/dL 0.2 - 1 Final    Nitrites (UA POC) Negative Negative Final    Leukocyte esterase (UA POC) 2+ Negative Final   Results for orders placed or performed in visit on 01/12/15   AMB POC URINALYSIS DIP STICK AUTO W/ MICRO     Status: None   Result Value Ref Range Status    Color (UA POC) Yellow  Final    Clarity (UA POC) Clear  Final    Glucose (UA POC) Negative Negative Final    Bilirubin (UA POC) Negative Negative Final    Ketones (UA POC) Negative Negative Final    Specific gravity (UA POC) 1.025 1.001 - 1.035 Final    Blood (UA POC) Negative Negative Final    pH (UA POC) 6.5 4.6 - 8.0 Final    Protein (UA POC) Negative Negative mg/dL Final    Urobilinogen (UA POC) 1 mg/dL 0.2 - 1 Final    Nitrites (UA POC) Negative Negative Final    Leukocyte esterase (UA POC) Negative Negative Final   Results for orders placed or performed in visit on 11/26/14   AMB POC URINALYSIS DIP STICK MANUAL W/O MICRO     Status: None   Result Value Ref Range Status    Color (UA POC) Light Yellow  Final    Clarity (UA POC) Clear  Final    Glucose (UA POC) Negative Negative Final    Bilirubin (UA POC) Negative Negative Final    Ketones (UA POC) Negative Negative Final    Specific gravity (UA POC) 1.030 1.001 - 1.035 Final    Blood (UA POC) 1+ Negative Final    pH (UA POC) 5.5 4.6 - 8.0 Final    Protein (UA POC) 1+ Negative mg/dL Final    Urobilinogen (UA POC) 0.2 mg/dL 0.2 - 1 Final    Nitrites (UA POC) Negative Negative Final    Leukocyte esterase (UA POC) 3+ Negative Final       Assessment/Plan:        ICD-10-CM ICD-9-CM    1. Infective urethritis N34.2 597.80 CULTURE, URINE      amoxicillin-clavulanate (AUGMENTIN) 875-125 mg per tablet   2. Burning with urination R30.0 788.1 AMB POC URINALYSIS DIP STICK AUTO W/O MICRO   3. Urinary frequency R35.0 788.41 AMB POC URINALYSIS DIP STICK AUTO W/O MICRO       Will give another 7-day course of Augmentin.  Will send urine culture. Follow-up Disposition:  Return in about 3 weeks (around 5/1/2017) for Dr. Atif Bella. I have discussed the diagnosis with the patient and the intended plan as seen in the above orders. The patient has received an after-visit summary and questions were answered concerning future plans. I have discussed medication side effects and warnings with the patient as well. I have reviewed the plan of care with the patient, accepted their input and they are in agreement with the treatment goals.        Kasia Mirza MD  April 12, 2017

## 2017-04-12 NOTE — MR AVS SNAPSHOT
Visit Information Date & Time Provider Department Dept. Phone Encounter #  
 4/12/2017  2:30 PM [facility] [phone] [id number] Follow-up Instructions Return in about 3 weeks (around 5/1/2017) for Dr. Dominique Geiger. Routing History Your Appointments 4/12/2017  2:30 PM  
Office Visit with Mele Du MD  
[address] [address]) Appt Note: f/u  
 [address] [facility] [address]  
  
   
 [address] [place]  
  
    
 5/1/2017  8:15 AM  
ROUTINE CARE with Ang Cortes MD  
[address]) Appt Note: Return in about 3 months (around 5/1/2017). [address] [facility] [address]  
  
   
 [address] [facility] Upcoming Health Maintenance Date Due  
 GLAUCOMA SCREENING Q2Y 7/9/2017 Pneumococcal 65+ Low/Medium Risk (2 of 2 - PCV13) 7/26/2017 MEDICARE YEARLY EXAM 7/27/2017 BREAST CANCER SCRN MAMMOGRAM 12/29/2018 COLONOSCOPY 7/25/2023 DTaP/Tdap/Td series (2 - Td) 7/9/2025 Allergies as of 4/12/2017  Review Complete On: 4/12/2017 By: Mele Du MD  
  
 Severity Noted Reaction Type Reactions Adhesive  11/24/2015    Other (comments) Skin burns - red spots Lisinopril  07/09/2015    Other (comments) Nitrofurantoin  05/13/2016    Other (comments) Current Immunizations  Never Reviewed Name Date Influenza High Dose Vaccine PF 8/12/2016, 10/19/2015 Pneumococcal Polysaccharide (PPSV-23) 7/26/2016  8:30 AM, 7/9/2015  9:00 AM  
 Tdap 7/9/2015  9:00 AM  
  
 Not reviewed this visit You Were Diagnosed With   
  
 Codes Comments Infective urethritis    -  Primary ICD-10-CM: N34.2 ICD-9-CM: 597.80 Burning with urination     ICD-10-CM: R30.0 ICD-9-CM: 788.1 Urinary frequency     ICD-10-CM: R35.0 ICD-9-CM: 788.41   
 Vitals BP Pulse Temp Resp Height(growth percentile) Weight(growth percentile) 136/86 (BP 1 Location: Right arm, BP Patient Position: At rest) 79 97.6 °F (36.4 °C) (Oral) 16 5' 1\" (1.549 m) 310 lb 12.8 oz (141 kg) SpO2 BMI OB Status Smoking Status 96% 58.73 kg/m2 Hysterectomy Never Smoker BMI and BSA Data Body Mass Index Body Surface Area 58.73 kg/m 2 2.46 m 2 Preferred Pharmacy Pharmacy Name Phone [facility] [address], [place] [phone] Your Updated Medication List  
  
   
This list is accurate as of: 4/12/17  2:12 PM.  Always use your most recent med list.  
  
  
  
  
 acetaminophen-codeine 300-30 mg per tablet Commonly known as:  TYLENOL-CODEINE #3 Take 1 Tab by mouth every six (6) hours as needed for Pain. Max Daily Amount: 4 Tabs. albuterol 90 mcg/actuation inhaler Commonly known as:  PROVENTIL HFA, VENTOLIN HFA, PROAIR HFA Take 2 Puffs by inhalation every six (6) hours as needed for Wheezing. alendronate 70 mg tablet Commonly known as:  FOSAMAX TAKE ONE TABLET BY MOUTH EVERY SUNDAY  
  
 amoxicillin-clavulanate 875-125 mg per tablet Commonly known as:  AUGMENTIN Take 1 Tab by mouth every twelve (12) hours for 7 days. * atorvastatin 10 mg tablet Commonly known as:  LIPITOR  
TAKE ONE TABLET BY MOUTH ONCE DAILY  
  
 * atorvastatin 10 mg tablet Commonly known as:  LIPITOR Take 1 Tab by mouth daily. DULoxetine 60 mg capsule Commonly known as:  CYMBALTA Take 1 Cap by mouth daily. ergocalciferol 50,000 unit capsule Commonly known as:  ERGOCALCIFEROL Take 1 Cap by mouth every seven (7) days. furosemide 40 mg tablet Commonly known as:  LASIX One po daily * losartan 25 mg tablet Commonly known as:  COZAAR Take 1 Tab by mouth daily. * losartan 25 mg tablet Commonly known as:  COZAAR  
TAKE ONE TABLET BY MOUTH ONCE DAILY metFORMIN 1,000 mg tablet Commonly known as:  GLUCOPHAGE Take 1 Tab by mouth two (2) times daily (with meals). oxybutynin 5 mg tablet Commonly known as:  DITROPAN  
TAKE ONE TABLET BY MOUTH ONCE DAILY pantoprazole 40 mg tablet Commonly known as:  PROTONIX Take 1 Tab by mouth daily. potassium chloride 10 mEq tablet Commonly known as:  K-DUR, KLOR-CON Take 1 Tab by mouth daily. * Notice: This list has 4 medication(s) that are the same as other medications prescribed for you. Read the directions carefully, and ask your doctor or other care provider to review them with you. Prescriptions Sent to Pharmacy Refills  
 amoxicillin-clavulanate (AUGMENTIN) 875-125 mg per tablet 0 Sig: Take 1 Tab by mouth every twelve (12) hours for 7 days. Class: Normal  
 Pharmacy: [facility] [address], [address] Ph #: [phone] Route: Oral  
  
We Performed the Following AMB POC URINALYSIS DIP STICK AUTO W/O MICRO [50974 CPT(R)] Follow-up Instructions Return in about 3 weeks (around 5/1/2017) for Dr. Monica Garcia. To-Do List   
 05/12/2017 Microbiology:  CULTURE, URINE Introducing [facility] & HEALTH SERVICES! Dear Igor Huddlestone: 
Thank you for requesting a [employer] account. Our records indicate that you already have an active [employer] account. You can access your account anytime at [email or web address] Did you know that you can access your hospital and ER discharge instructions at any time in [employer] You can also review all of your test results from your hospital stay or ER visit. Additional Information If you have questions, please visit the Frequently Asked Questions section of the [employer] website at [email or web address] Remember, [employer] is NOT to be used for urgent needs. For medical emergencies, dial 911. Now available from your iPhone and Android! Please provide this summary of care documentation to your next provider. Your primary care clinician is listed as Lucille Ta. If you have any questions after today's visit, please call [phone].

## 2017-04-12 NOTE — PROGRESS NOTES
Chief Complaint   Patient presents with    Urinary Frequency     1. Have you been to the ER, urgent care clinic since your last visit? Hospitalized since your last visit? No    2. Have you seen or consulted any other health care providers outside of the [address] [place] [address] since your last visit? Include any pap smears or colon screening.  No

## 2017-04-13 DIAGNOSIS — N39.41 URGE INCONTINENCE OF URINE: ICD-10-CM

## 2017-04-14 LAB
BACTERIA SPEC CULT: ABNORMAL
SERVICE CMNT-IMP: ABNORMAL

## 2017-04-14 RX ORDER — OXYBUTYNIN CHLORIDE 5 MG/1
TABLET ORAL
Qty: 90 TAB | Refills: 0 | Status: SHIPPED | OUTPATIENT
Start: 2017-04-14 | End: 2017-11-08 | Stop reason: SDUPTHER

## 2017-04-21 DIAGNOSIS — I10 ESSENTIAL HYPERTENSION: ICD-10-CM

## 2017-04-21 RX ORDER — LOSARTAN POTASSIUM 25 MG/1
25 TABLET ORAL DAILY
Qty: 90 TAB | Refills: 1 | Status: SHIPPED | OUTPATIENT
Start: 2017-04-21 | End: 2018-03-25 | Stop reason: SDUPTHER

## 2017-05-01 ENCOUNTER — OFFICE VISIT (OUTPATIENT)
Dept: FAMILY MEDICINE CLINIC | Age: 67
End: 2017-05-01

## 2017-05-01 VITALS
RESPIRATION RATE: 16 BRPM | SYSTOLIC BLOOD PRESSURE: 143 MMHG | OXYGEN SATURATION: 96 % | DIASTOLIC BLOOD PRESSURE: 77 MMHG | HEART RATE: 88 BPM | WEIGHT: 293 LBS | TEMPERATURE: 97.3 F | BODY MASS INDEX: 55.32 KG/M2 | HEIGHT: 61 IN

## 2017-05-01 DIAGNOSIS — M81.8 OTHER OSTEOPOROSIS: ICD-10-CM

## 2017-05-01 DIAGNOSIS — M25.552 PAIN OF LEFT HIP JOINT: Primary | ICD-10-CM

## 2017-05-01 DIAGNOSIS — I10 ESSENTIAL HYPERTENSION: ICD-10-CM

## 2017-05-01 PROBLEM — M81.0 OSTEOPOROSIS: Status: ACTIVE | Noted: 2017-05-01

## 2017-05-01 NOTE — PROGRESS NOTES
1. Have you been to the ER, urgent care clinic since your last visit? Hospitalized since your last visit? No    2. Have you seen or consulted any other health care providers outside of the [address] [place] [address] since your last visit? Include any pap smears or colon screening.  No

## 2017-05-01 NOTE — MR AVS SNAPSHOT
Visit Information Date & Time Provider Department Dept. Phone Encounter #  
 5/1/2017  8:15 AM [id number] [phone] [id number] Upcoming Health Maintenance Date Due  
 GLAUCOMA SCREENING Q2Y 7/9/2017 Pneumococcal 65+ Low/Medium Risk (2 of 2 - PCV13) 7/26/2017 MEDICARE YEARLY EXAM 7/27/2017 INFLUENZA AGE 9 TO ADULT 8/1/2017 BREAST CANCER SCRN MAMMOGRAM 12/29/2018 COLONOSCOPY 7/25/2023 DTaP/Tdap/Td series (2 - Td) 7/9/2025 Allergies as of 5/1/2017  Review Complete On: 5/1/2017 By: [id number] MD  
  
 Severity Noted Reaction Type Reactions Adhesive  11/24/2015    Other (comments) Skin burns - red spots Lisinopril  07/09/2015    Other (comments) Nitrofurantoin  05/13/2016    Other (comments) Current Immunizations  Never Reviewed Name Date Influenza High Dose Vaccine PF 8/12/2016, 10/19/2015 Pneumococcal Polysaccharide (PPSV-23) 7/26/2016  8:30 AM, 7/9/2015  9:00 AM  
 Tdap 7/9/2015  9:00 AM  
  
 Not reviewed this visit You Were Diagnosed With   
  
 Codes Comments Pain of left hip joint    -  Primary ICD-10-CM: M25.552 ICD-9-CM: 719.45 Essential hypertension     ICD-10-CM: I10 
ICD-9-CM: 401.9 Other osteoporosis     ICD-10-CM: M81.8 ICD-9-CM: 733.09 Vitals BP Pulse Temp Resp Height(growth percentile) Weight(growth percentile) 143/77 88 97.3 °F (36.3 °C) (Oral) 16 5' 1\" (1.549 m) 307 lb (139.3 kg) SpO2 BMI OB Status Smoking Status 96% 58.01 kg/m2 Hysterectomy Never Smoker Vitals History BMI and BSA Data Body Mass Index Body Surface Area 58.01 kg/m 2 2.45 m 2 Preferred Pharmacy Pharmacy Name Phone [facility] [address], [place] [phone] Your Updated Medication List  
  
   
This list is accurate as of: 5/1/17  9:22 AM.  Always use your most recent med list.  
  
  
  
  
 acetaminophen-codeine 300-30 mg per tablet Commonly known as:  TYLENOL-CODEINE #3 Take 1 Tab by mouth every six (6) hours as needed for Pain. Max Daily Amount: 4 Tabs. albuterol 90 mcg/actuation inhaler Commonly known as:  PROVENTIL HFA, VENTOLIN HFA, PROAIR HFA Take 2 Puffs by inhalation every six (6) hours as needed for Wheezing. alendronate 70 mg tablet Commonly known as:  FOSAMAX TAKE ONE TABLET BY MOUTH EVERY SUNDAY  
  
 atorvastatin 10 mg tablet Commonly known as:  LIPITOR Take 1 Tab by mouth daily. DULoxetine 60 mg capsule Commonly known as:  CYMBALTA Take 1 Cap by mouth daily. ergocalciferol 50,000 unit capsule Commonly known as:  ERGOCALCIFEROL Take 1 Cap by mouth every seven (7) days. furosemide 40 mg tablet Commonly known as:  LASIX One po daily * losartan 25 mg tablet Commonly known as:  COZAAR  
TAKE ONE TABLET BY MOUTH ONCE DAILY  
  
 * losartan 25 mg tablet Commonly known as:  COZAAR Take 1 Tab by mouth daily. metFORMIN 1,000 mg tablet Commonly known as:  GLUCOPHAGE Take 1 Tab by mouth two (2) times daily (with meals). oxybutynin 5 mg tablet Commonly known as:  DITROPAN  
TAKE ONE TABLET BY MOUTH ONCE DAILY pantoprazole 40 mg tablet Commonly known as:  PROTONIX Take 1 Tab by mouth daily. potassium chloride 10 mEq tablet Commonly known as:  KLOR-CON Take 1 Tab by mouth daily. * Notice: This list has 2 medication(s) that are the same as other medications prescribed for you. Read the directions carefully, and ask your doctor or other care provider to review them with you. Introducing [facility] & HEALTH SERVICES! Dear Stephanie Espinosa: 
Thank you for requesting a [employer] account. Our records indicate that you already have an active [employer] account. You can access your account anytime at [email or web address] Did you know that you can access your hospital and ER discharge instructions at any time in [employer]? You can also review all of your test results from your hospital stay or ER visit. Additional Information If you have questions, please visit the Frequently Asked Questions section of the [employer] website at [email or web address] Remember, [employer] is NOT to be used for urgent needs. For medical emergencies, dial 911. Now available from your iPhone and Android! Please provide this summary of care documentation to your next provider. Your primary care clinician is listed as Jada Gilbert. If you have any questions after today's visit, please call [phone].

## 2017-05-01 NOTE — PROGRESS NOTES
Ana Wolf is a 79 y.o.  female and presents with     Chief Complaint   Patient presents with    Diabetes    Hypertension    Cholesterol Problem     Pt is here for DEXA results. Pt informs that her symptoms of UTi have resolved. Pt has pain in her left hip. Pt thinks it could be her weight. She informs that she is doing well as far as DM and HTN are concerned. Past Medical History:   Diagnosis Date    Degenerative disk disease     Depression     Depression     Diffuse idiopathic skeletal hyperostosis     Fracture of leg     Gallstone     Lymphedema of leg 1975    bilateral lower legs    Morbid obesity ([address]     Pulmonary arterial hypertension (HCC)     MANPREET (stress urinary incontinence, female)      Past Surgical History:   Procedure Laterality Date    HX HYSTERECTOMY      HX KNEE REPLACEMENT      HX ORTHOPAEDIC      right shoulder and right knee replaced x 2, left knee replaced as well     Current Outpatient Prescriptions   Medication Sig    losartan (COZAAR) 25 mg tablet Take 1 Tab by mouth daily.  oxybutynin (DITROPAN) 5 mg tablet TAKE ONE TABLET BY MOUTH ONCE DAILY    atorvastatin (LIPITOR) 10 mg tablet Take 1 Tab by mouth daily.  alendronate (FOSAMAX) 70 mg tablet TAKE ONE TABLET BY MOUTH EVERY SUNDAY    losartan (COZAAR) 25 mg tablet TAKE ONE TABLET BY MOUTH ONCE DAILY    furosemide (LASIX) 40 mg tablet One po daily    acetaminophen-codeine (TYLENOL-CODEINE #3) 300-30 mg per tablet Take 1 Tab by mouth every six (6) hours as needed for Pain. Max Daily Amount: 4 Tabs.  potassium chloride (K-DUR, KLOR-CON) 10 mEq tablet Take 1 Tab by mouth daily.  ergocalciferol (ERGOCALCIFEROL) 50,000 unit capsule Take 1 Cap by mouth every seven (7) days.  pantoprazole (PROTONIX) 40 mg tablet Take 1 Tab by mouth daily.  DULoxetine (CYMBALTA) 60 mg capsule Take 1 Cap by mouth daily.     metFORMIN (GLUCOPHAGE) 1,000 mg tablet Take 1 Tab by mouth two (2) times daily (with meals).  albuterol (PROVENTIL HFA, VENTOLIN HFA, PROAIR HFA) 90 mcg/actuation inhaler Take 2 Puffs by inhalation every six (6) hours as needed for Wheezing. No current facility-administered medications for this visit. Health Maintenance   Topic Date Due    GLAUCOMA SCREENING Q2Y  07/09/2017    Pneumococcal 65+ Low/Medium Risk (2 of 2 - PCV13) 07/26/2017    MEDICARE YEARLY EXAM  07/27/2017    INFLUENZA AGE 9 TO ADULT  08/01/2017    BREAST CANCER SCRN MAMMOGRAM  12/29/2018    COLONOSCOPY  07/25/2023    DTaP/Tdap/Td series (2 - Td) 07/09/2025    Hepatitis C Screening  Completed    OSTEOPOROSIS SCREENING (DEXA)  Completed    ZOSTER VACCINE AGE 60>  Completed     Immunization History   Administered Date(s) Administered    Influenza High Dose Vaccine PF 10/19/2015, 08/12/2016    Pneumococcal Polysaccharide (PPSV-23) 07/09/2015, 07/26/2016    Tdap 07/09/2015     No LMP recorded. Patient has had a hysterectomy. Allergies and Intolerances: Allergies   Allergen Reactions    Adhesive Other (comments)     Skin burns - red spots    Lisinopril Other (comments)    Nitrofurantoin Other (comments)       Family History:   Family History   Problem Relation Age of Onset    Heart Disease Father     Diabetes Father        Social History:   She  reports that she has never smoked. She has never used smokeless tobacco.  She  reports that she does not drink alcohol.             Review of Systems:   General: negative for - chills, fatigue, fever, weight change  Psych: negative for - anxiety, depression, irritability or mood swings  ENT: negative for - headaches, hearing change, nasal congestion, oral lesions, sneezing or sore throat  Heme/ Lymph: negative for - bleeding problems, bruising, pallor or swollen lymph nodes  Endo: negative for - hot flashes, polydipsia/polyuria or temperature intolerance  Resp: negative for - cough, shortness of breath or wheezing  CV: negative for - chest pain, edema or palpitations  GI: negative for - abdominal pain, change in bowel habits, constipation, diarrhea or nausea/vomiting  : negative for - dysuria, hematuria, incontinence, pelvic pain or vulvar/vaginal symptoms  MSK: negative for - joint pain, joint swelling or muscle pain,pos for left hip pain  Neuro: negative for - confusion, headaches, seizures or weakness  Derm: negative for - dry skin, hair changes, rash or skin lesion changes          Physical:   Vitals:   Vitals:    05/01/17 0817   BP: 143/77   Pulse: 88   Resp: 16   Temp: 97.3 °F (36.3 °C)   TempSrc: Oral   SpO2: 96%   Weight: 307 lb (139.3 kg)   Height: 5' 1\" (1.549 m)           Exam:   HEENT- atraumatic,normocephalic, awake, oriented, well nourished  Neck - supple,no enlarged lymph nodes, no JVD, no thyromegaly  Chest- CTA, no rhonchi, no crackles  Heart- rrr, no murmurs / gallop/rub  Abdomen- soft,BS+,NT, no hepatosplenomegaly  Ext - no c/c/edema   Neuro- no focal deficits. Power 5/5 all extremities  Skin - warm,dry, no obvious rashes. Review of Data:   LABS:   Lab Results   Component Value Date/Time    WBC 5.3 01/25/2017 09:59 AM    HGB 13.3 01/25/2017 09:59 AM    HCT 40.2 01/25/2017 09:59 AM    PLATELET 921 26/98/0474 09:59 AM     Lab Results   Component Value Date/Time    Sodium 144 01/25/2017 09:59 AM    Potassium 4.5 01/25/2017 09:59 AM    Chloride 100 01/25/2017 09:59 AM    CO2 24 01/25/2017 09:59 AM    Glucose 75 01/25/2017 09:59 AM    BUN 11 01/25/2017 09:59 AM    Creatinine 0.70 01/25/2017 09:59 AM     Lab Results   Component Value Date/Time    Cholesterol, total 179 01/25/2017 09:59 AM    HDL Cholesterol 75 01/25/2017 09:59 AM    LDL, calculated 78 01/25/2017 09:59 AM    Triglyceride 130 01/25/2017 09:59 AM     No results found for: GPT        Impression / Plan:        ICD-10-CM ICD-9-CM    1. Pain of left hip joint M25.552 719.45    2. Essential hypertension I10 401.9    3.  Other osteoporosis M81.8 733.09      Pt does not want any imaging done for left hip pain. DM/HTN/Hyperchol - stable    Explained to patient risk benefits of the medications. Advised patient to stop meds if having any side effects. Pt verbalized understanding of the instructions. I have discussed the diagnosis with the patient and the intended plan as seen in the above orders. The patient has received an after-visit summary and questions were answered concerning future plans. I have discussed medication side effects and warnings with the patient as well. I have reviewed the plan of care with the patient, accepted their input and they are in agreement with the treatment goals. Reviewed plan of care. Patient has provided input and agrees with goals.     Follow-up Disposition: Not on Kavin Lopez MD

## 2017-06-12 DIAGNOSIS — N39.0 URINARY TRACT INFECTION WITHOUT HEMATURIA, SITE UNSPECIFIED: Primary | ICD-10-CM

## 2017-06-12 RX ORDER — SULFAMETHOXAZOLE AND TRIMETHOPRIM 800; 160 MG/1; MG/1
1 TABLET ORAL 2 TIMES DAILY
Qty: 14 TAB | Refills: 0 | Status: SHIPPED | OUTPATIENT
Start: 2017-06-12 | End: 2017-06-19

## 2017-08-07 ENCOUNTER — HOSPITAL ENCOUNTER (OUTPATIENT)
Dept: LAB | Age: 67
Discharge: HOME | End: 2017-08-07

## 2017-08-07 ENCOUNTER — OFFICE VISIT (OUTPATIENT)
Dept: FAMILY MEDICINE CLINIC | Age: 67
End: 2017-08-07

## 2017-08-07 VITALS
SYSTOLIC BLOOD PRESSURE: 135 MMHG | TEMPERATURE: 96.5 F | WEIGHT: 293 LBS | RESPIRATION RATE: 18 BRPM | HEART RATE: 71 BPM | DIASTOLIC BLOOD PRESSURE: 83 MMHG | OXYGEN SATURATION: 93 % | BODY MASS INDEX: 55.32 KG/M2 | HEIGHT: 61 IN

## 2017-08-07 DIAGNOSIS — R30.0 DYSURIA: ICD-10-CM

## 2017-08-07 DIAGNOSIS — Z13.5 GLAUCOMA SCREENING: ICD-10-CM

## 2017-08-07 DIAGNOSIS — M48.00 SPINAL STENOSIS, UNSPECIFIED SPINAL REGION: ICD-10-CM

## 2017-08-07 DIAGNOSIS — R41.3 MEMORY IMPAIRMENT: ICD-10-CM

## 2017-08-07 DIAGNOSIS — R26.81 UNSTEADY GAIT: ICD-10-CM

## 2017-08-07 DIAGNOSIS — R73.01 IFG (IMPAIRED FASTING GLUCOSE): ICD-10-CM

## 2017-08-07 DIAGNOSIS — R63.4 WEIGHT LOSS: ICD-10-CM

## 2017-08-07 DIAGNOSIS — R35.0 FREQUENT URINATION: ICD-10-CM

## 2017-08-07 DIAGNOSIS — R21 RASH OF FACE: ICD-10-CM

## 2017-08-07 DIAGNOSIS — Z13.39 SCREENING FOR ALCOHOLISM: ICD-10-CM

## 2017-08-07 DIAGNOSIS — Z00.00 MEDICARE ANNUAL WELLNESS VISIT, SUBSEQUENT: Primary | ICD-10-CM

## 2017-08-07 DIAGNOSIS — Z00.00 ROUTINE GENERAL MEDICAL EXAMINATION AT A HEALTH CARE FACILITY: ICD-10-CM

## 2017-08-07 DIAGNOSIS — M35.00 SJOGREN'S SYNDROME, WITH UNSPECIFIED ORGAN INVOLVEMENT (HCC): ICD-10-CM

## 2017-08-07 DIAGNOSIS — Z23 ENCOUNTER FOR VACCINATION: ICD-10-CM

## 2017-08-07 PROCEDURE — 99001 SPECIMEN HANDLING PT-LAB: CPT | Performed by: INTERNAL MEDICINE

## 2017-08-07 NOTE — MR AVS SNAPSHOT
Visit Information Date & Time Provider Department Dept. Phone Encounter #  
 8/7/2017  8:00 AM [phone] Follow-up Instructions Return in 1 month (on 9/7/2017). Upcoming Health Maintenance Date Due  
 GLAUCOMA SCREENING Q2Y 7/9/2017 Pneumococcal 65+ Low/Medium Risk (2 of 2 - PCV13) 7/26/2017 MEDICARE YEARLY EXAM 7/27/2017 INFLUENZA AGE 9 TO ADULT 8/1/2017 BREAST CANCER SCRN MAMMOGRAM 12/29/2018 COLONOSCOPY 7/25/2023 DTaP/Tdap/Td series (2 - Td) 7/9/2025 Allergies as of 8/7/2017  Review Complete On: 8/7/2017 By: Cindy Edwards LPN Severity Noted Reaction Type Reactions Adhesive  11/24/2015    Other (comments) Skin burns - red spots Lisinopril  07/09/2015    Other (comments) Nitrofurantoin  05/13/2016    Other (comments) Current Immunizations  Never Reviewed Name Date Influenza High Dose Vaccine PF 8/12/2016, 10/19/2015 Pneumococcal Conjugate (PCV-13)  Incomplete Pneumococcal Polysaccharide (PPSV-23) 7/26/2016  8:30 AM, 7/9/2015  9:00 AM  
 Tdap 7/9/2015  9:00 AM  
  
 Not reviewed this visit You Were Diagnosed With   
  
 Codes Comments Frequent urination    -  Primary ICD-10-CM: R35.0 ICD-9-CM: 788.41 Rash of face     ICD-10-CM: R21 
ICD-9-CM: 782.1 Sjogren's syndrome, with unspecified organ involvement ([facility]     ICD-10-CM: M35.00 ICD-9-CM: 710.2 Memory impairment     ICD-10-CM: R41.3 ICD-9-CM: 780.93 IFG (impaired fasting glucose)     ICD-10-CM: R73.01 
ICD-9-CM: 790.21 Weight loss     ICD-10-CM: R63.4 ICD-9-CM: 783.21 Dysuria     ICD-10-CM: R30.0 ICD-9-CM: 788.1 Encounter for vaccination     ICD-10-CM: L81 ICD-9-CM: V05.9 Medicare annual wellness visit, subsequent     ICD-10-CM: Z00.00 ICD-9-CM: V70.0 Glaucoma screening     ICD-10-CM: Z13.5 ICD-9-CM: V80.1 Routine general medical examination at a health care facility     ICD-10-CM: Z00.00 ICD-9-CM: V70.0 Screening for alcoholism     ICD-10-CM: Z13.89 ICD-9-CM: V79.1 Spinal stenosis, unspecified spinal region     ICD-10-CM: M48.00 ICD-9-CM: 724.00 Unsteady gait     ICD-10-CM: R26.81 
ICD-9-CM: 798. 2 Vitals BP Pulse Temp Resp Height(growth percentile) Weight(growth percentile) 135/83 71 96.5 °F (35.8 °C) (Oral) 18 5' 1\" (1.549 m) 299 lb 3.2 oz (135.7 kg) SpO2 BMI OB Status Smoking Status 93% 56.53 kg/m2 Hysterectomy Never Smoker Vitals History BMI and BSA Data Body Mass Index Body Surface Area  
 56.53 kg/m 2 2.42 m 2 Preferred Pharmacy Pharmacy Name Phone [facility] [address], [address] [place] [address] [phone] Your Updated Medication List  
  
   
This list is accurate as of: 8/7/17  8:56 AM.  Always use your most recent med list.  
  
  
  
  
 acetaminophen-codeine 300-30 mg per tablet Commonly known as:  TYLENOL-CODEINE #3 Take 1 Tab by mouth every six (6) hours as needed for Pain. Max Daily Amount: 4 Tabs. albuterol 90 mcg/actuation inhaler Commonly known as:  PROVENTIL HFA, VENTOLIN HFA, PROAIR HFA Take 2 Puffs by inhalation every six (6) hours as needed for Wheezing. alendronate 70 mg tablet Commonly known as:  FOSAMAX TAKE ONE TABLET BY MOUTH EVERY SUNDAY  
  
 atorvastatin 10 mg tablet Commonly known as:  LIPITOR Take 1 Tab by mouth daily. DULoxetine 60 mg capsule Commonly known as:  CYMBALTA Take 1 Cap by mouth daily. ergocalciferol 50,000 unit capsule Commonly known as:  ERGOCALCIFEROL Take 1 Cap by mouth every seven (7) days. furosemide 40 mg tablet Commonly known as:  LASIX One po daily * losartan 25 mg tablet Commonly known as:  COZAAR  
TAKE ONE TABLET BY MOUTH ONCE DAILY  
  
 * losartan 25 mg tablet Commonly known as:  COZAAR  
 Take 1 Tab by mouth daily. metFORMIN 1,000 mg tablet Commonly known as:  GLUCOPHAGE Take 1 Tab by mouth two (2) times daily (with meals). oxybutynin 5 mg tablet Commonly known as:  DITROPAN  
TAKE ONE TABLET BY MOUTH ONCE DAILY pantoprazole 40 mg tablet Commonly known as:  PROTONIX Take 1 Tab by mouth daily. potassium chloride 10 mEq tablet Commonly known as:  KLOR-CON Take 1 Tab by mouth daily. * Notice: This list has 2 medication(s) that are the same as other medications prescribed for you. Read the directions carefully, and ask your doctor or other care provider to review them with you. We Performed the Following AMB POC URINALYSIS DIP STICK AUTO W/O MICRO [13661 CPT(R)] PNEUMOCOCCAL CONJ VACCINE 13 VALENT IM [id number] CPT(R)] REFERRAL TO OPHTHALMOLOGY [REF57 Custom] Comments:  
 Please evaluate patient for  Glaucoma screening REFERRAL TO PHYSICAL THERAPY [LEG67 Custom] Comments:  
 Please evaluate patient for gait imbalance Follow-up Instructions Return in 1 month (on 9/7/2017). To-Do List   
 08/07/2017 Lab:  LAURIE QL, W/REFLEX CASCADE   
  
 08/07/2017 Lab:  LAURIE QL, W/REFLEX CASCADE   
  
 08/07/2017 Lab:  CBC WITH AUTOMATED DIFF   
  
 08/07/2017 Lab:  HEMOGLOBIN A1C WITH EAG   
  
 08/07/2017 Lab:  METABOLIC PANEL, COMPREHENSIVE   
  
 08/07/2017 Lab:  RPR   
  
 08/07/2017 Lab:  TSH 3RD GENERATION   
  
 08/07/2017 Lab:  VITAMIN B12 & FOLATE Referral Information Referral ID Referred By Referred To  
  
 [id number] [facility], [facility] Not Available Visits Status Start Date End Date 1 New Request 8/7/17 8/7/18 If your referral has a status of pending review or denied, additional information will be sent to support the outcome of this decision. Referral ID Referred By Referred To  
 [id number] [facility], [address] [place] [address] Not Available Visits Status Start Date End Date 1 New Request 8/7/17 8/7/18 If your referral has a status of pending review or denied, additional information will be sent to support the outcome of this decision. Patient Instructions Medicare Wellness Visit, Female The best way to live healthy is to have a healthy lifestyle by eating a well-balanced diet, exercising regularly, limiting alcohol and stopping smoking. Regular physical exams and screening tests are another way to keep healthy. Preventive exams provided by your health care provider can find health problems before they become diseases or illnesses. Preventive services including immunizations, screening tests, monitoring and exams can help you take care of your own health. All people over age 72 should have a pneumovax  and and a prevnar shot to prevent pneumonia. These are once in a lifetime unless you and your provider decide differently. All people over 65 should have a yearly flu shot and a tetanus vaccine every 10 years. A bone mass density to screen for osteoporosis or thinning of the bones should be done every 2 years after 65. Screening for diabetes mellitus with a blood sugar test should be done every year. Glaucoma is a disease of the eye due to increased ocular pressure that can lead to blindness and it should be done every year by an eye professional. 
 
Cardiovascular screening tests that check for elevated lipids (fatty part of blood) which can lead to heart disease and strokes should be done every 5 years. Colorectal screening that evaluates for blood or polyps in your colon should be done yearly as a stool test or every five years as a flexible sigmoidoscope or every 10 years as a colonoscopy up to age 76. Breast cancer screening with a mammogram is recommended biennially  for women age 54-69.  
 
Screening for cervical cancer with a pap smear and pelvic exam is recommended for women after age 72 years every 2 years up to age 79 or when the provider and patient decide to stop. If there is a history of cervical abnormalities or other increased risk for cancer then the test is recommended yearly. Hepatitis C screening is also recommended for anyone born between 80 through [address]. A shingles vaccine is also recommended once in a lifetime after age 61. Your Medicare Wellness Exam is recommended annually. Here is a list of your current Health Maintenance items with a due date: 
Health Maintenance Due Topic Date Due  Glaucoma Screening   07/09/2017  Pneumococcal Vaccine (2 of 2 - PCV13) 07/26/2017 Jose Enrique Rashid Annual Well Visit  07/27/2017  Flu Vaccine  08/01/2017 [facility]! Dear Lexis Peck: 
Thank you for requesting a HiFiKiddo account. Our records indicate that you already have an active HiFiKiddo account. You can access your account anytime at [email or web address] Did you know that you can access your hospital and ER discharge instructions at any time in HiFiKiddo? You can also review all of your test results from your hospital stay or ER visit. Additional Information If you have questions, please visit the Frequently Asked Questions section of the HiFiKiddo website at [email or web address] Remember, HiFiKiddo is NOT to be used for urgent needs. For medical emergencies, dial 911. Now available from your iPhone and Android! Please provide this summary of care documentation to your next provider. Your primary care clinician is listed as Tim Diop. If you have any questions after today's visit, please call [phone].

## 2017-08-07 NOTE — PROGRESS NOTES
1. Have you been to the ER, urgent care clinic since your last visit? Hospitalized since your last visit? No    2. Have you seen or consulted any other health care providers outside of the [address] [place] [address] since your last visit? Include any pap smears or colon screening.  No

## 2017-08-07 NOTE — PATIENT INSTRUCTIONS

## 2017-08-10 LAB
ALBUMIN SERPL-MCNC: NORMAL G/DL
ALP SERPL-CCNC: NORMAL U/L
ALT SERPL-CCNC: NORMAL U/L
ANA SER QL: POSITIVE
AST SERPL-CCNC: NORMAL U/L
BACTERIA UR CULT: ABNORMAL
BASOPHILS # BLD AUTO: 0 X10E3/UL (ref 0–0.2)
BASOPHILS NFR BLD AUTO: 1 %
BILIRUB SERPL-MCNC: NORMAL MG/DL
BUN SERPL-MCNC: NORMAL MG/DL
CALCIUM SERPL-MCNC: NORMAL MG/DL
CHLORIDE SERPL-SCNC: NORMAL MMOL/L
CHROMATIN AB SERPL-ACNC: <0.2 AI (ref 0–0.9)
CO2 SERPL-SCNC: NORMAL MMOL/L
CREAT SERPL-MCNC: NORMAL MG/DL
DSDNA AB SER-ACNC: <1 IU/ML (ref 0–9)
ENA RNP AB SER-ACNC: 1.2 AI (ref 0–0.9)
ENA SM AB SER-ACNC: <0.2 AI (ref 0–0.9)
ENA SM+RNP AB SER-ACNC: <0.2 AI (ref 0–0.9)
EOSINOPHIL # BLD AUTO: 0.1 X10E3/UL (ref 0–0.4)
EOSINOPHIL NFR BLD AUTO: 2 %
ERYTHROCYTE [DISTWIDTH] IN BLOOD BY AUTOMATED COUNT: 14.4 % (ref 12.3–15.4)
EST. AVERAGE GLUCOSE BLD GHB EST-MCNC: 111 MG/DL
FOLATE SERPL-MCNC: 10.3 NG/ML
GLUCOSE SERPL-MCNC: NORMAL MG/DL
HBA1C MFR BLD: 5.5 % (ref 4.8–5.6)
HCT VFR BLD AUTO: 41.3 % (ref 34–46.6)
HGB BLD-MCNC: 13.8 G/DL (ref 11.1–15.9)
IMM GRANULOCYTES # BLD: 0 X10E3/UL (ref 0–0.1)
IMM GRANULOCYTES NFR BLD: 0 %
LYMPHOCYTES # BLD AUTO: 1.3 X10E3/UL (ref 0.7–3.1)
LYMPHOCYTES NFR BLD AUTO: 21 %
MCH RBC QN AUTO: 30.5 PG (ref 26.6–33)
MCHC RBC AUTO-ENTMCNC: 33.4 G/DL (ref 31.5–35.7)
MCV RBC AUTO: 91 FL (ref 79–97)
MONOCYTES # BLD AUTO: 0.5 X10E3/UL (ref 0.1–0.9)
MONOCYTES NFR BLD AUTO: 8 %
NEUTROPHILS # BLD AUTO: 4 X10E3/UL (ref 1.4–7)
NEUTROPHILS NFR BLD AUTO: 68 %
PLATELET # BLD AUTO: 233 X10E3/UL (ref 150–379)
POTASSIUM SERPL-SCNC: NORMAL MMOL/L
PROT SERPL-MCNC: NORMAL G/DL
RBC # BLD AUTO: 4.53 X10E6/UL (ref 3.77–5.28)
RPR SER QL: NON REACTIVE
SEE BELOW:, 164879: ABNORMAL
SODIUM SERPL-SCNC: NORMAL MMOL/L
TSH SERPL DL<=0.005 MIU/L-ACNC: 2.13 UIU/ML (ref 0.45–4.5)
VIT B12 SERPL-MCNC: 224 PG/ML (ref 211–946)
WBC # BLD AUTO: 5.9 X10E3/UL (ref 3.4–10.8)

## 2017-08-14 ENCOUNTER — TELEPHONE (OUTPATIENT)
Dept: FAMILY MEDICINE CLINIC | Age: 67
End: 2017-08-14

## 2017-08-15 DIAGNOSIS — R73.01 IFG (IMPAIRED FASTING GLUCOSE): ICD-10-CM

## 2017-08-15 DIAGNOSIS — E66.9 OBESITY: ICD-10-CM

## 2017-08-16 RX ORDER — METFORMIN HYDROCHLORIDE 500 MG/1
TABLET ORAL
Qty: 180 TAB | Refills: 4 | Status: SHIPPED | OUTPATIENT
Start: 2017-08-16 | End: 2018-11-27 | Stop reason: SDUPTHER

## 2017-08-31 ENCOUNTER — OFFICE VISIT (OUTPATIENT)
Dept: FAMILY MEDICINE CLINIC | Age: 67
End: 2017-08-31

## 2017-08-31 ENCOUNTER — HOSPITAL ENCOUNTER (OUTPATIENT)
Dept: LAB | Age: 67
Discharge: HOME | End: 2017-08-31

## 2017-08-31 VITALS
BODY MASS INDEX: 55.32 KG/M2 | TEMPERATURE: 98.3 F | HEART RATE: 82 BPM | WEIGHT: 293 LBS | DIASTOLIC BLOOD PRESSURE: 71 MMHG | SYSTOLIC BLOOD PRESSURE: 131 MMHG | OXYGEN SATURATION: 94 % | HEIGHT: 61 IN | RESPIRATION RATE: 16 BRPM

## 2017-08-31 DIAGNOSIS — L71.9 ROSACEA: ICD-10-CM

## 2017-08-31 DIAGNOSIS — N39.0 URINARY TRACT INFECTION WITHOUT HEMATURIA, SITE UNSPECIFIED: Primary | ICD-10-CM

## 2017-08-31 DIAGNOSIS — E53.8 VITAMIN B12 DEFICIENCY: ICD-10-CM

## 2017-08-31 PROCEDURE — 99001 SPECIMEN HANDLING PT-LAB: CPT | Performed by: INTERNAL MEDICINE

## 2017-08-31 RX ORDER — SULFAMETHOXAZOLE AND TRIMETHOPRIM 800; 160 MG/1; MG/1
1 TABLET ORAL 2 TIMES DAILY
Qty: 14 TAB | Refills: 0 | Status: SHIPPED | OUTPATIENT
Start: 2017-08-31 | End: 2017-09-07

## 2017-08-31 NOTE — PROGRESS NOTES
1. Have you been to the ER, urgent care clinic since your last visit? Hospitalized since your last visit? No    2. Have you seen or consulted any other health care providers outside of the [address] [place] [address] since your last visit? Include any pap smears or colon screening.  No

## 2017-08-31 NOTE — MR AVS SNAPSHOT
Visit Information Date & Time Provider Department Dept. Phone Encounter #  
 8/31/2017  8:30 AM [id number], [address] [facility] [phone] [id number] Follow-up Instructions Return in about 3 months (around 11/30/2017). Follow-up and Disposition History Upcoming Health Maintenance Date Due  
 GLAUCOMA SCREENING Q2Y 7/9/2017 INFLUENZA AGE 9 TO ADULT 8/1/2017 MEDICARE YEARLY EXAM 8/8/2018 BREAST CANCER SCRN MAMMOGRAM 12/29/2018 COLONOSCOPY 7/25/2023 DTaP/Tdap/Td series (2 - Td) 7/9/2025 Allergies as of 8/31/2017  Review Complete On: 8/31/2017 By: [id number] MD  
  
 Severity Noted Reaction Type Reactions Adhesive  11/24/2015    Other (comments) Skin burns - red spots Lisinopril  07/09/2015    Other (comments) Nitrofurantoin  05/13/2016    Other (comments) Current Immunizations  Never Reviewed Name Date Influenza High Dose Vaccine PF 8/12/2016, 10/19/2015 Pneumococcal Conjugate (PCV-13) 8/7/2017 Pneumococcal Polysaccharide (PPSV-23) 7/26/2016  8:30 AM, 7/9/2015  9:00 AM  
 Tdap 7/9/2015  9:00 AM  
  
 Not reviewed this visit You Were Diagnosed With   
  
 Codes Comments Urinary tract infection without hematuria, site unspecified    -  Primary ICD-10-CM: N39.0 ICD-9-CM: 599.0 Vitamin B12 deficiency     ICD-10-CM: E53.8 ICD-9-CM: 266.2 Rosacea     ICD-10-CM: L71.9 ICD-9-CM: 695.3 Vitals BP Pulse Temp Resp Height(growth percentile) Weight(growth percentile) 131/71 (BP 1 Location: Left arm, BP Patient Position: Sitting) 82 98.3 °F (36.8 °C) (Oral) 16 5' 1\" (1.549 m) 300 lb 4.8 oz (136.2 kg) SpO2 BMI OB Status Smoking Status 94% 56.74 kg/m2 Hysterectomy Never Smoker Vitals History BMI and BSA Data Body Mass Index Body Surface Area 56.74 kg/m 2 2.42 m 2 Preferred Pharmacy Pharmacy Name Phone [facility] [address], [address] [phone] Your Updated Medication List  
  
   
This list is accurate as of: 8/31/17  9:30 AM.  Always use your most recent med list.  
  
  
  
  
 acetaminophen-codeine 300-30 mg per tablet Commonly known as:  TYLENOL-CODEINE #3 Take 1 Tab by mouth every six (6) hours as needed for Pain. Max Daily Amount: 4 Tabs. albuterol 90 mcg/actuation inhaler Commonly known as:  PROVENTIL HFA, VENTOLIN HFA, PROAIR HFA Take 2 Puffs by inhalation every six (6) hours as needed for Wheezing. alendronate 70 mg tablet Commonly known as:  FOSAMAX TAKE ONE TABLET BY MOUTH EVERY SUNDAY  
  
 atorvastatin 10 mg tablet Commonly known as:  LIPITOR Take 1 Tab by mouth daily. DULoxetine 60 mg capsule Commonly known as:  CYMBALTA Take 1 Cap by mouth daily. ergocalciferol 50,000 unit capsule Commonly known as:  ERGOCALCIFEROL Take 1 Cap by mouth every seven (7) days. furosemide 40 mg tablet Commonly known as:  LASIX One po daily * losartan 25 mg tablet Commonly known as:  COZAAR  
TAKE ONE TABLET BY MOUTH ONCE DAILY  
  
 * losartan 25 mg tablet Commonly known as:  COZAAR Take 1 Tab by mouth daily. * metFORMIN 1,000 mg tablet Commonly known as:  GLUCOPHAGE Take 1 Tab by mouth two (2) times daily (with meals). * metFORMIN 500 mg tablet Commonly known as:  GLUCOPHAGE  
TAKE ONE TABLET BY MOUTH TWICE DAILY WITH MEALS  
  
 metroNIDAZOLE 1 % topical cream  
Commonly known as:  Radha Bills Apply  to affected area two (2) times daily (with meals). Use a thin layer to affected areas after washing  
  
 oxybutynin 5 mg tablet Commonly known as:  DITROPAN  
TAKE ONE TABLET BY MOUTH ONCE DAILY pantoprazole 40 mg tablet Commonly known as:  PROTONIX Take 1 Tab by mouth daily. potassium chloride 10 mEq tablet Commonly known as:  KLOR-CON Take 1 Tab by mouth daily. trimethoprim-sulfamethoxazole 160-800 mg per tablet Commonly known as:  BACTRIM DS, SEPTRA DS Take 1 Tab by mouth two (2) times a day for 7 days. * Notice: This list has 4 medication(s) that are the same as other medications prescribed for you. Read the directions carefully, and ask your doctor or other care provider to review them with you. Prescriptions Sent to Pharmacy Refills  
 trimethoprim-sulfamethoxazole (BACTRIM DS, SEPTRA DS) 160-800 mg per tablet 0 Sig: Take 1 Tab by mouth two (2) times a day for 7 days. Class: Normal  
 Pharmacy: [facility] [address], [address] Ph #: [phone] Route: Oral  
 metroNIDAZOLE (NORITATE) 1 % topical cream 2 Sig: Apply  to affected area two (2) times daily (with meals). Use a thin layer to affected areas after washing Class: Normal  
 Pharmacy: [facility] [address], [address] Ph #: [phone] Route: Topical  
  
Follow-up Instructions Return in about 3 months (around 11/30/2017). To-Do List   
 08/31/2017 Lab:  INTRINSIC FACTOR AB   
  
 08/31/2017 Lab:  PARIETAL CELL AB Introducing [facility]! Dear Amy Grover: 
Thank you for requesting a AOMi account. Our records indicate that you already have an active AOMi account. You can access your account anytime at [email or web address] Did you know that you can access your hospital and ER discharge instructions at any time in AOMi? You can also review all of your test results from your hospital stay or ER visit. Additional Information If you have questions, please visit the Frequently Asked Questions section of the AOMi website at [email or web address] Remember, AOMi is NOT to be used for urgent needs. For medical emergencies, dial 911. Now available from your iPhone and Android! Please provide this summary of care documentation to your next provider. Your primary care clinician is listed as Hodan Pantoja. If you have any questions after today's visit, please call [phone].

## 2017-08-31 NOTE — PROGRESS NOTES
Benjamin Saleem is a 79 y.o.  female and presents with     Chief Complaint   Patient presents with    Bladder Infection    Memory Loss       Pt has rash on nose and face. Some papular lesions like Rosacea. Pt thinks she is having memory problems. Past Medical History:   Diagnosis Date    Degenerative disk disease     Depression     Depression     Diffuse idiopathic skeletal hyperostosis     Fracture of leg     Gallstone     Lymphedema of leg 1975    bilateral lower legs    Morbid obesity ([address]     Pulmonary arterial hypertension (HCC)     MANPREET (stress urinary incontinence, female)      Past Surgical History:   Procedure Laterality Date    HX HYSTERECTOMY      HX KNEE REPLACEMENT      HX ORTHOPAEDIC      right shoulder and right knee replaced x 2, left knee replaced as well     Current Outpatient Prescriptions   Medication Sig    trimethoprim-sulfamethoxazole (BACTRIM DS, SEPTRA DS) 160-800 mg per tablet Take 1 Tab by mouth two (2) times a day for 7 days.  metFORMIN (GLUCOPHAGE) 500 mg tablet TAKE ONE TABLET BY MOUTH TWICE DAILY WITH MEALS    losartan (COZAAR) 25 mg tablet Take 1 Tab by mouth daily.  oxybutynin (DITROPAN) 5 mg tablet TAKE ONE TABLET BY MOUTH ONCE DAILY    atorvastatin (LIPITOR) 10 mg tablet Take 1 Tab by mouth daily.  alendronate (FOSAMAX) 70 mg tablet TAKE ONE TABLET BY MOUTH EVERY SUNDAY    losartan (COZAAR) 25 mg tablet TAKE ONE TABLET BY MOUTH ONCE DAILY    furosemide (LASIX) 40 mg tablet One po daily    acetaminophen-codeine (TYLENOL-CODEINE #3) 300-30 mg per tablet Take 1 Tab by mouth every six (6) hours as needed for Pain. Max Daily Amount: 4 Tabs.  potassium chloride (K-DUR, KLOR-CON) 10 mEq tablet Take 1 Tab by mouth daily.  ergocalciferol (ERGOCALCIFEROL) 50,000 unit capsule Take 1 Cap by mouth every seven (7) days.  pantoprazole (PROTONIX) 40 mg tablet Take 1 Tab by mouth daily.     DULoxetine (CYMBALTA) 60 mg capsule Take 1 Cap by mouth daily.    metFORMIN (GLUCOPHAGE) 1,000 mg tablet Take 1 Tab by mouth two (2) times daily (with meals).  albuterol (PROVENTIL HFA, VENTOLIN HFA, PROAIR HFA) 90 mcg/actuation inhaler Take 2 Puffs by inhalation every six (6) hours as needed for Wheezing. No current facility-administered medications for this visit. Health Maintenance   Topic Date Due    GLAUCOMA SCREENING Q2Y  07/09/2017    INFLUENZA AGE 9 TO ADULT  08/01/2017    MEDICARE YEARLY EXAM  08/08/2018    BREAST CANCER SCRN MAMMOGRAM  12/29/2018    COLONOSCOPY  07/25/2023    DTaP/Tdap/Td series (2 - Td) 07/09/2025    Hepatitis C Screening  Completed    OSTEOPOROSIS SCREENING (DEXA)  Completed    ZOSTER VACCINE AGE 60>  Completed    Pneumococcal 65+ Low/Medium Risk  Completed     Immunization History   Administered Date(s) Administered    Influenza High Dose Vaccine PF 10/19/2015, 08/12/2016    Pneumococcal Conjugate (PCV-13) 08/07/2017    Pneumococcal Polysaccharide (PPSV-23) 07/09/2015, 07/26/2016    Tdap 07/09/2015     No LMP recorded. Patient has had a hysterectomy. Allergies and Intolerances: Allergies   Allergen Reactions    Adhesive Other (comments)     Skin burns - red spots    Lisinopril Other (comments)    Nitrofurantoin Other (comments)       Family History:   Family History   Problem Relation Age of Onset    Heart Disease Father     Diabetes Father        Social History:   She  reports that she has never smoked. She has never used smokeless tobacco.  She  reports that she does not drink alcohol.             Review of Systems:   General: negative for - chills, fatigue, fever, weight change  Psych: negative for - anxiety, depression, irritability or mood swings  ENT: negative for - headaches, hearing change, nasal congestion, oral lesions, sneezing or sore throat  Heme/ Lymph: negative for - bleeding problems, bruising, pallor or swollen lymph nodes  Endo: negative for - hot flashes, polydipsia/polyuria or temperature intolerance  Resp: negative for - cough, shortness of breath or wheezing  CV: negative for - chest pain, edema or palpitations  GI: negative for - abdominal pain, change in bowel habits, constipation, diarrhea or nausea/vomiting  : negative for - dysuria, hematuria, incontinence, pelvic pain or vulvar/vaginal symptoms  MSK: negative for - joint pain, joint swelling or muscle pain  Neuro: negative for - confusion, headaches, seizures or weakness  Derm: negative for - dry skin, hair changes, rash or skin lesion changes          Physical:   Vitals:   Vitals:    08/31/17 0844   BP: 131/71   Pulse: 82   Resp: 16   Temp: 98.3 °F (36.8 °C)   TempSrc: Oral   SpO2: 94%   Weight: 300 lb 4.8 oz (136.2 kg)   Height: 5' 1\" (1.549 m)           Exam:   HEENT- atraumatic,normocephalic, awake, oriented, well nourished  Neck - supple,no enlarged lymph nodes, no JVD, no thyromegaly  Chest- CTA, no rhonchi, no crackles  Heart- rrr, no murmurs / gallop/rub  Abdomen- soft,BS+,NT, no hepatosplenomegaly  Ext - no c/c/edema   Neuro- no focal deficits. Power 5/5 all extremities  Skin - warm,dry, no obvious rashes. Review of Data:   LABS:   Lab Results   Component Value Date/Time    WBC 5.9 08/07/2017 09:10 AM    HGB 13.8 08/07/2017 09:10 AM    HCT 41.3 08/07/2017 09:10 AM    PLATELET 592 37/14/0732 09:10 AM     Lab Results   Component Value Date/Time    Sodium CANCELED 08/07/2017 09:10 AM    Potassium CANCELED 08/07/2017 09:10 AM    Chloride CANCELED 08/07/2017 09:10 AM    CO2 CANCELED 08/07/2017 09:10 AM    Glucose CANCELED 08/07/2017 09:10 AM    BUN CANCELED 08/07/2017 09:10 AM    Creatinine CANCELED 08/07/2017 09:10 AM     Lab Results   Component Value Date/Time    Cholesterol, total 179 01/25/2017 09:59 AM    HDL Cholesterol 75 01/25/2017 09:59 AM    LDL, calculated 78 01/25/2017 09:59 AM    Triglyceride 130 01/25/2017 09:59 AM     No results found for: GPT        Impression / Plan:        ICD-10-CM ICD-9-CM    1. Urinary tract infection without hematuria, site unspecified N39.0 599.0 trimethoprim-sulfamethoxazole (BACTRIM DS, SEPTRA DS) 160-800 mg per tablet   2. Vitamin B12 deficiency E53.8 266.2 INTRINSIC FACTOR AB      PARIETAL CELL AB     Memory concenr- pt got 30/30 on MMSE.  reassurred pt,     Explained to patient risk benefits of the medications. Advised patient to stop meds if having any side effects. Pt verbalized understanding of the instructions. I have discussed the diagnosis with the patient and the intended plan as seen in the above orders. The patient has received an after-visit summary and questions were answered concerning future plans. I have discussed medication side effects and warnings with the patient as well. I have reviewed the plan of care with the patient, accepted their input and they are in agreement with the treatment goals. Reviewed plan of care. Patient has provided input and agrees with goals.     Follow-up Disposition: Not on Nona Alves MD

## 2017-09-01 LAB
IF BLOCK AB SER-ACNC: 1 AU/ML (ref 0–1.1)
PCA AB SER-ACNC: 12 UNITS (ref 0–20)

## 2017-10-13 DIAGNOSIS — H00.014 HORDEOLUM EXTERNUM OF LEFT UPPER EYELID: Primary | ICD-10-CM

## 2017-10-13 RX ORDER — CEPHALEXIN 500 MG/1
500 CAPSULE ORAL 3 TIMES DAILY
Qty: 30 CAP | Refills: 0 | Status: SHIPPED | OUTPATIENT
Start: 2017-10-13 | End: 2017-10-21

## 2017-10-13 RX ORDER — CIPROFLOXACIN HYDROCHLORIDE 3.5 MG/ML
2 SOLUTION/ DROPS TOPICAL EVERY 4 HOURS
Qty: 5 ML | Refills: 0 | Status: SHIPPED | OUTPATIENT
Start: 2017-10-13 | End: 2017-10-16

## 2017-10-15 DIAGNOSIS — M79.89 LEG SWELLING: ICD-10-CM

## 2017-10-17 RX ORDER — FUROSEMIDE 20 MG/1
TABLET ORAL
Qty: 90 TAB | Refills: 1 | Status: SHIPPED | OUTPATIENT
Start: 2017-10-17 | End: 2018-06-27

## 2017-10-19 ENCOUNTER — HOSPITAL ENCOUNTER (INPATIENT)
Age: 67
LOS: 2 days | Discharge: HOME HEALTH | DRG: 872 | End: 2017-10-21
Attending: EMERGENCY MEDICINE | Admitting: HOSPITALIST
Payer: MEDICARE

## 2017-10-19 ENCOUNTER — APPOINTMENT (OUTPATIENT)
Dept: GENERAL RADIOLOGY | Age: 67
DRG: 872 | End: 2017-10-19
Attending: EMERGENCY MEDICINE
Payer: MEDICARE

## 2017-10-19 ENCOUNTER — APPOINTMENT (OUTPATIENT)
Dept: CT IMAGING | Age: 67
DRG: 872 | End: 2017-10-19
Attending: EMERGENCY MEDICINE
Payer: MEDICARE

## 2017-10-19 DIAGNOSIS — N20.0 KIDNEY STONE: ICD-10-CM

## 2017-10-19 DIAGNOSIS — N10 ACUTE PYELONEPHRITIS: ICD-10-CM

## 2017-10-19 DIAGNOSIS — A41.9 SEPSIS, DUE TO UNSPECIFIED ORGANISM: Primary | ICD-10-CM

## 2017-10-19 PROBLEM — N39.0 UTI (URINARY TRACT INFECTION): Status: ACTIVE | Noted: 2017-10-19

## 2017-10-19 LAB
ALBUMIN SERPL-MCNC: 3.1 G/DL (ref 3.4–5)
ALBUMIN/GLOB SERPL: 0.8 {RATIO} (ref 0.8–1.7)
ALP SERPL-CCNC: 76 U/L (ref 45–117)
ALT SERPL-CCNC: 13 U/L (ref 13–56)
ANION GAP SERPL CALC-SCNC: 8 MMOL/L (ref 3–18)
APPEARANCE UR: ABNORMAL
AST SERPL-CCNC: 12 U/L (ref 15–37)
ATRIAL RATE: 89 BPM
BACTERIA URNS QL MICRO: ABNORMAL /HPF
BASOPHILS # BLD: 0 K/UL (ref 0–0.06)
BASOPHILS NFR BLD: 0 % (ref 0–2)
BILIRUB SERPL-MCNC: 2.3 MG/DL (ref 0.2–1)
BILIRUB UR QL: NEGATIVE
BUN SERPL-MCNC: 11 MG/DL (ref 7–18)
BUN/CREAT SERPL: 14 (ref 12–20)
CALCIUM SERPL-MCNC: 8.9 MG/DL (ref 8.5–10.1)
CALCULATED P AXIS, ECG09: 22 DEGREES
CALCULATED R AXIS, ECG10: -7 DEGREES
CALCULATED T AXIS, ECG11: 23 DEGREES
CHLORIDE SERPL-SCNC: 107 MMOL/L (ref 100–108)
CK SERPL-CCNC: 43 U/L (ref 26–192)
CO2 SERPL-SCNC: 25 MMOL/L (ref 21–32)
COLOR UR: ABNORMAL
CREAT SERPL-MCNC: 0.79 MG/DL (ref 0.6–1.3)
DIAGNOSIS, 93000: NORMAL
DIFFERENTIAL METHOD BLD: ABNORMAL
EOSINOPHIL # BLD: 0 K/UL (ref 0–0.4)
EOSINOPHIL NFR BLD: 0 % (ref 0–5)
EPITH CASTS URNS QL MICRO: ABNORMAL /LPF (ref 0–5)
ERYTHROCYTE [DISTWIDTH] IN BLOOD BY AUTOMATED COUNT: 13.5 % (ref 11.6–14.5)
EST. AVERAGE GLUCOSE BLD GHB EST-MCNC: 114 MG/DL
FLUAV AG NPH QL IA: NEGATIVE
FLUBV AG NOSE QL IA: NEGATIVE
GLOBULIN SER CALC-MCNC: 3.7 G/DL (ref 2–4)
GLUCOSE BLD STRIP.AUTO-MCNC: 109 MG/DL (ref 70–110)
GLUCOSE BLD STRIP.AUTO-MCNC: 144 MG/DL (ref 70–110)
GLUCOSE SERPL-MCNC: 152 MG/DL (ref 74–99)
GLUCOSE UR STRIP.AUTO-MCNC: NEGATIVE MG/DL
HBA1C MFR BLD: 5.6 % (ref 4.2–5.6)
HCT VFR BLD AUTO: 35.9 % (ref 35–45)
HGB BLD-MCNC: 12 G/DL (ref 12–16)
HGB UR QL STRIP: ABNORMAL
KETONES UR QL STRIP.AUTO: 15 MG/DL
LACTATE BLD-SCNC: 1 MMOL/L (ref 0.4–2)
LEUKOCYTE ESTERASE UR QL STRIP.AUTO: ABNORMAL
LYMPHOCYTES # BLD: 0.5 K/UL (ref 0.9–3.6)
LYMPHOCYTES NFR BLD: 4 % (ref 21–52)
MCH RBC QN AUTO: 30.4 PG (ref 24–34)
MCHC RBC AUTO-ENTMCNC: 33.4 G/DL (ref 31–37)
MCV RBC AUTO: 90.9 FL (ref 74–97)
MONOCYTES # BLD: 1.1 K/UL (ref 0.05–1.2)
MONOCYTES NFR BLD: 9 % (ref 3–10)
NEUTS SEG # BLD: 9.8 K/UL (ref 1.8–8)
NEUTS SEG NFR BLD: 87 % (ref 40–73)
NITRITE UR QL STRIP.AUTO: POSITIVE
P-R INTERVAL, ECG05: 154 MS
PH UR STRIP: 6 [PH] (ref 5–8)
PLATELET # BLD AUTO: 157 K/UL (ref 135–420)
PMV BLD AUTO: 10.3 FL (ref 9.2–11.8)
POTASSIUM SERPL-SCNC: 3.6 MMOL/L (ref 3.5–5.5)
PROT SERPL-MCNC: 6.8 G/DL (ref 6.4–8.2)
PROT UR STRIP-MCNC: ABNORMAL MG/DL
Q-T INTERVAL, ECG07: 384 MS
QRS DURATION, ECG06: 132 MS
QTC CALCULATION (BEZET), ECG08: 467 MS
RBC # BLD AUTO: 3.95 M/UL (ref 4.2–5.3)
RBC #/AREA URNS HPF: ABNORMAL /HPF (ref 0–5)
SODIUM SERPL-SCNC: 140 MMOL/L (ref 136–145)
SP GR UR REFRACTOMETRY: 1.02 (ref 1–1.03)
UROBILINOGEN UR QL STRIP.AUTO: 1 EU/DL (ref 0.2–1)
VENTRICULAR RATE, ECG03: 89 BPM
WBC # BLD AUTO: 11.3 K/UL (ref 4.6–13.2)
WBC URNS QL MICRO: ABNORMAL /HPF (ref 0–4)

## 2017-10-19 PROCEDURE — 87077 CULTURE AEROBIC IDENTIFY: CPT | Performed by: EMERGENCY MEDICINE

## 2017-10-19 PROCEDURE — 82550 ASSAY OF CK (CPK): CPT | Performed by: EMERGENCY MEDICINE

## 2017-10-19 PROCEDURE — 83036 HEMOGLOBIN GLYCOSYLATED A1C: CPT | Performed by: NURSE PRACTITIONER

## 2017-10-19 PROCEDURE — 96375 TX/PRO/DX INJ NEW DRUG ADDON: CPT

## 2017-10-19 PROCEDURE — 87040 BLOOD CULTURE FOR BACTERIA: CPT | Performed by: EMERGENCY MEDICINE

## 2017-10-19 PROCEDURE — 77030011943

## 2017-10-19 PROCEDURE — 81001 URINALYSIS AUTO W/SCOPE: CPT | Performed by: EMERGENCY MEDICINE

## 2017-10-19 PROCEDURE — 74011250637 HC RX REV CODE- 250/637: Performed by: NURSE PRACTITIONER

## 2017-10-19 PROCEDURE — 74011250636 HC RX REV CODE- 250/636: Performed by: EMERGENCY MEDICINE

## 2017-10-19 PROCEDURE — 96365 THER/PROPH/DIAG IV INF INIT: CPT

## 2017-10-19 PROCEDURE — 74011250636 HC RX REV CODE- 250/636: Performed by: INTERNAL MEDICINE

## 2017-10-19 PROCEDURE — 74011000258 HC RX REV CODE- 258: Performed by: EMERGENCY MEDICINE

## 2017-10-19 PROCEDURE — 93005 ELECTROCARDIOGRAM TRACING: CPT

## 2017-10-19 PROCEDURE — 87804 INFLUENZA ASSAY W/OPTIC: CPT | Performed by: HOSPITALIST

## 2017-10-19 PROCEDURE — 94640 AIRWAY INHALATION TREATMENT: CPT

## 2017-10-19 PROCEDURE — 74011250637 HC RX REV CODE- 250/637: Performed by: EMERGENCY MEDICINE

## 2017-10-19 PROCEDURE — 74011250637 HC RX REV CODE- 250/637: Performed by: HOSPITALIST

## 2017-10-19 PROCEDURE — 83605 ASSAY OF LACTIC ACID: CPT

## 2017-10-19 PROCEDURE — 87086 URINE CULTURE/COLONY COUNT: CPT | Performed by: EMERGENCY MEDICINE

## 2017-10-19 PROCEDURE — 65270000029 HC RM PRIVATE

## 2017-10-19 PROCEDURE — 85025 COMPLETE CBC W/AUTO DIFF WBC: CPT | Performed by: EMERGENCY MEDICINE

## 2017-10-19 PROCEDURE — 71010 XR CHEST PORT: CPT

## 2017-10-19 PROCEDURE — 77030020263 HC SOL INJ SOD CL0.9% LFCR 1000ML

## 2017-10-19 PROCEDURE — 99285 EMERGENCY DEPT VISIT HI MDM: CPT

## 2017-10-19 PROCEDURE — 74176 CT ABD & PELVIS W/O CONTRAST: CPT

## 2017-10-19 PROCEDURE — 96366 THER/PROPH/DIAG IV INF ADDON: CPT

## 2017-10-19 PROCEDURE — 74011000250 HC RX REV CODE- 250: Performed by: HOSPITALIST

## 2017-10-19 PROCEDURE — 80053 COMPREHEN METABOLIC PANEL: CPT | Performed by: EMERGENCY MEDICINE

## 2017-10-19 PROCEDURE — 74011250636 HC RX REV CODE- 250/636: Performed by: HOSPITALIST

## 2017-10-19 PROCEDURE — 87186 SC STD MICRODIL/AGAR DIL: CPT | Performed by: EMERGENCY MEDICINE

## 2017-10-19 PROCEDURE — 82962 GLUCOSE BLOOD TEST: CPT

## 2017-10-19 RX ORDER — METFORMIN HYDROCHLORIDE 500 MG/1
1000 TABLET ORAL 2 TIMES DAILY WITH MEALS
Status: DISCONTINUED | OUTPATIENT
Start: 2017-10-19 | End: 2017-10-19

## 2017-10-19 RX ORDER — HEPARIN SODIUM 5000 [USP'U]/ML
5000 INJECTION, SOLUTION INTRAVENOUS; SUBCUTANEOUS EVERY 8 HOURS
Status: DISCONTINUED | OUTPATIENT
Start: 2017-10-19 | End: 2017-10-21 | Stop reason: HOSPADM

## 2017-10-19 RX ORDER — POTASSIUM CHLORIDE 750 MG/1
10 TABLET, FILM COATED, EXTENDED RELEASE ORAL DAILY
Status: DISCONTINUED | OUTPATIENT
Start: 2017-10-19 | End: 2017-10-21 | Stop reason: HOSPADM

## 2017-10-19 RX ORDER — ACETAMINOPHEN 325 MG/1
650 TABLET ORAL
Status: DISCONTINUED | OUTPATIENT
Start: 2017-10-19 | End: 2017-10-21 | Stop reason: HOSPADM

## 2017-10-19 RX ORDER — LOSARTAN POTASSIUM 50 MG/1
25 TABLET ORAL DAILY
Status: DISCONTINUED | OUTPATIENT
Start: 2017-10-19 | End: 2017-10-21 | Stop reason: HOSPADM

## 2017-10-19 RX ORDER — ACETAMINOPHEN 500 MG
1000 TABLET ORAL
Status: COMPLETED | OUTPATIENT
Start: 2017-10-19 | End: 2017-10-19

## 2017-10-19 RX ORDER — OXYBUTYNIN CHLORIDE 5 MG/1
5 TABLET ORAL 2 TIMES DAILY
Status: DISCONTINUED | OUTPATIENT
Start: 2017-10-19 | End: 2017-10-21 | Stop reason: HOSPADM

## 2017-10-19 RX ORDER — HYDROMORPHONE HCL IN 0.9% NACL 50 MG/50ML
0.5 PLASTIC BAG, INJECTION (ML) INJECTION
Status: DISCONTINUED | OUTPATIENT
Start: 2017-10-19 | End: 2017-10-20

## 2017-10-19 RX ORDER — INSULIN LISPRO 100 [IU]/ML
INJECTION, SOLUTION INTRAVENOUS; SUBCUTANEOUS
Status: DISCONTINUED | OUTPATIENT
Start: 2017-10-19 | End: 2017-10-21 | Stop reason: HOSPADM

## 2017-10-19 RX ORDER — ALBUTEROL SULFATE 90 UG/1
2 AEROSOL, METERED RESPIRATORY (INHALATION)
Status: DISCONTINUED | OUTPATIENT
Start: 2017-10-19 | End: 2017-10-19

## 2017-10-19 RX ORDER — SODIUM CHLORIDE 9 MG/ML
75 INJECTION, SOLUTION INTRAVENOUS CONTINUOUS
Status: DISCONTINUED | OUTPATIENT
Start: 2017-10-19 | End: 2017-10-21 | Stop reason: HOSPADM

## 2017-10-19 RX ORDER — DEXTROSE 50 % IN WATER (D50W) INTRAVENOUS SYRINGE
25-50 AS NEEDED
Status: DISCONTINUED | OUTPATIENT
Start: 2017-10-19 | End: 2017-10-19 | Stop reason: RX

## 2017-10-19 RX ORDER — METFORMIN HYDROCHLORIDE 500 MG/1
500 TABLET ORAL 2 TIMES DAILY WITH MEALS
Status: DISCONTINUED | OUTPATIENT
Start: 2017-10-19 | End: 2017-10-19

## 2017-10-19 RX ORDER — LOSARTAN POTASSIUM 50 MG/1
25 TABLET ORAL DAILY
Status: DISCONTINUED | OUTPATIENT
Start: 2017-10-19 | End: 2017-10-19 | Stop reason: SDUPTHER

## 2017-10-19 RX ORDER — ATORVASTATIN CALCIUM 10 MG/1
10 TABLET, FILM COATED ORAL DAILY
Status: DISCONTINUED | OUTPATIENT
Start: 2017-10-19 | End: 2017-10-21 | Stop reason: HOSPADM

## 2017-10-19 RX ORDER — MORPHINE SULFATE 2 MG/ML
2 INJECTION, SOLUTION INTRAMUSCULAR; INTRAVENOUS
Status: DISCONTINUED | OUTPATIENT
Start: 2017-10-19 | End: 2017-10-19 | Stop reason: CLARIF

## 2017-10-19 RX ORDER — LANOLIN ALCOHOL/MO/W.PET/CERES
3 CREAM (GRAM) TOPICAL
Status: DISCONTINUED | OUTPATIENT
Start: 2017-10-19 | End: 2017-10-21 | Stop reason: HOSPADM

## 2017-10-19 RX ORDER — ACETAMINOPHEN AND CODEINE PHOSPHATE 300; 30 MG/1; MG/1
1 TABLET ORAL
Status: DISCONTINUED | OUTPATIENT
Start: 2017-10-19 | End: 2017-10-19

## 2017-10-19 RX ORDER — ALBUTEROL SULFATE 0.83 MG/ML
2.5 SOLUTION RESPIRATORY (INHALATION)
Status: DISCONTINUED | OUTPATIENT
Start: 2017-10-19 | End: 2017-10-21 | Stop reason: HOSPADM

## 2017-10-19 RX ORDER — ALBUTEROL SULFATE 0.83 MG/ML
2.5 SOLUTION RESPIRATORY (INHALATION)
Status: DISCONTINUED | OUTPATIENT
Start: 2017-10-19 | End: 2017-10-19

## 2017-10-19 RX ORDER — SODIUM CHLORIDE 0.9 % (FLUSH) 0.9 %
5-10 SYRINGE (ML) INJECTION AS NEEDED
Status: DISCONTINUED | OUTPATIENT
Start: 2017-10-19 | End: 2017-10-21 | Stop reason: HOSPADM

## 2017-10-19 RX ORDER — MORPHINE SULFATE 4 MG/ML
4 INJECTION, SOLUTION INTRAMUSCULAR; INTRAVENOUS
Status: COMPLETED | OUTPATIENT
Start: 2017-10-19 | End: 2017-10-19

## 2017-10-19 RX ORDER — MAGNESIUM SULFATE 100 %
4 CRYSTALS MISCELLANEOUS AS NEEDED
Status: DISCONTINUED | OUTPATIENT
Start: 2017-10-19 | End: 2017-10-21 | Stop reason: HOSPADM

## 2017-10-19 RX ORDER — DULOXETIN HYDROCHLORIDE 60 MG/1
60 CAPSULE, DELAYED RELEASE ORAL DAILY
Status: DISCONTINUED | OUTPATIENT
Start: 2017-10-19 | End: 2017-10-21 | Stop reason: HOSPADM

## 2017-10-19 RX ORDER — PANTOPRAZOLE SODIUM 40 MG/1
40 TABLET, DELAYED RELEASE ORAL DAILY
Status: DISCONTINUED | OUTPATIENT
Start: 2017-10-19 | End: 2017-10-21 | Stop reason: HOSPADM

## 2017-10-19 RX ADMIN — OXYBUTYNIN CHLORIDE 5 MG: 5 TABLET ORAL at 10:11

## 2017-10-19 RX ADMIN — CEFTRIAXONE 1 G: 1 INJECTION, POWDER, FOR SOLUTION INTRAMUSCULAR; INTRAVENOUS at 03:55

## 2017-10-19 RX ADMIN — DULOXETINE HYDROCHLORIDE 60 MG: 60 CAPSULE, DELAYED RELEASE ORAL at 10:11

## 2017-10-19 RX ADMIN — SODIUM CHLORIDE 1000 MG: 900 INJECTION, SOLUTION INTRAVENOUS at 04:08

## 2017-10-19 RX ADMIN — ATORVASTATIN CALCIUM 10 MG: 10 TABLET, FILM COATED ORAL at 10:11

## 2017-10-19 RX ADMIN — SODIUM CHLORIDE 1000 ML: 900 INJECTION, SOLUTION INTRAVENOUS at 04:05

## 2017-10-19 RX ADMIN — SODIUM CHLORIDE 1000 ML: 900 INJECTION, SOLUTION INTRAVENOUS at 03:52

## 2017-10-19 RX ADMIN — POTASSIUM CHLORIDE 10 MEQ: 750 TABLET, FILM COATED, EXTENDED RELEASE ORAL at 10:11

## 2017-10-19 RX ADMIN — OXYBUTYNIN CHLORIDE 5 MG: 5 TABLET ORAL at 17:36

## 2017-10-19 RX ADMIN — PANTOPRAZOLE SODIUM 40 MG: 40 TABLET, DELAYED RELEASE ORAL at 10:11

## 2017-10-19 RX ADMIN — LOSARTAN POTASSIUM 25 MG: 50 TABLET ORAL at 10:11

## 2017-10-19 RX ADMIN — MELATONIN TAB 3 MG 3 MG: 3 TAB at 22:53

## 2017-10-19 RX ADMIN — ACETAMINOPHEN 1000 MG: 500 TABLET ORAL at 03:50

## 2017-10-19 RX ADMIN — SODIUM CHLORIDE 75 ML/HR: 900 INJECTION, SOLUTION INTRAVENOUS at 10:15

## 2017-10-19 RX ADMIN — HEPARIN SODIUM 5000 UNITS: 5000 INJECTION, SOLUTION INTRAVENOUS; SUBCUTANEOUS at 10:11

## 2017-10-19 RX ADMIN — ALBUTEROL SULFATE 2.5 MG: 2.5 SOLUTION RESPIRATORY (INHALATION) at 10:46

## 2017-10-19 RX ADMIN — Medication 0.5 MG: at 16:56

## 2017-10-19 RX ADMIN — MORPHINE SULFATE 4 MG: 4 INJECTION, SOLUTION INTRAMUSCULAR; INTRAVENOUS at 06:19

## 2017-10-19 RX ADMIN — SODIUM CHLORIDE 1000 MG: 900 INJECTION, SOLUTION INTRAVENOUS at 06:19

## 2017-10-19 RX ADMIN — ACETAMINOPHEN 650 MG: 325 TABLET, FILM COATED ORAL at 13:03

## 2017-10-19 RX ADMIN — HEPARIN SODIUM 5000 UNITS: 5000 INJECTION, SOLUTION INTRAVENOUS; SUBCUTANEOUS at 17:36
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
## 2017-10-19 NOTE — ED NOTES
Pt's clothes placed in pt belonging bag (underwear, jacket, shirt, pants) with label. Cane left at bedside with label.

## 2017-10-19 NOTE — IP AVS SNAPSHOT
Dai Abdul 
 
 
 [address] [place] [address] 
[phone] Patient: Concepcion Silva MRN: [id number] Current Discharge Medication List  
  
START taking these medications Dose & Instructions Dispensing Information Comments Morning Noon Evening Bedtime  
 levoFLOXacin 750 mg tablet Commonly known as:  Rue Reinaldo Dose:  750 mg Take 1 Tab by mouth daily. Quantity:  7 Tab Refills:  0 Due 10/21 CONTINUE these medications which have NOT CHANGED Dose & Instructions Dispensing Information Comments Morning Noon Evening Bedtime  
 acetaminophen-codeine 300-30 mg per tablet Commonly known as:  TYLENOL-CODEINE #3 Dose:  1 Tab Take 1 Tab by mouth every six (6) hours as needed for Pain. Max Daily Amount: 4 Tabs. Quantity:  40 Tab Refills:  0  
     
   
   
   
  
 albuterol 90 mcg/actuation inhaler Commonly known as:  PROVENTIL HFA, VENTOLIN HFA, PROAIR HFA Dose:  2 Puff Take 2 Puffs by inhalation every six (6) hours as needed for Wheezing. Quantity:  1 Inhaler Refills:  3  
     
   
   
   
  
 alendronate 70 mg tablet Commonly known as:  FOSAMAX TAKE ONE TABLET BY MOUTH EVERY SUNDAY Quantity:  4 Tab Refills:  0  
     
   
   
   
  
 atorvastatin 10 mg tablet Commonly known as:  LIPITOR Dose:  10 mg Take 1 Tab by mouth daily. Quantity:  90 Tab Refills:  0 Next due 10/22 DULoxetine 60 mg capsule Commonly known as:  CYMBALTA Dose:  60 mg Take 1 Cap by mouth daily. Quantity:  90 Cap Refills:  3 Next dose 10/22  
   
   
   
  
 ergocalciferol 50,000 unit capsule Commonly known as:  ERGOCALCIFEROL Dose:  17684 Units Take 1 Cap by mouth every seven (7) days. Quantity:  4 Cap Refills:  12 Due 10/21 * furosemide 40 mg tablet Commonly known as:  LASIX One po daily Quantity:  30 Tab Refills:  3 Due 10/21 * furosemide 20 mg tablet Commonly known as:  LASIX TAKE ONE TABLET BY MOUTH ONCE DAILY Quantity:  90 Tab Refills:  1 Due 10/21 * losartan 25 mg tablet Commonly known as:  COZAAR  
   
 TAKE ONE TABLET BY MOUTH ONCE DAILY Quantity:  90 Tab Refills:  0 Next dose 10/22 * losartan 25 mg tablet Commonly known as:  COZAAR Dose:  25 mg Take 1 Tab by mouth daily. Quantity:  90 Tab Refills:  1  
     
   
   
   
  
 * metFORMIN 1,000 mg tablet Commonly known as:  GLUCOPHAGE Dose:  1000 mg Take 1 Tab by mouth two (2) times daily (with meals). Quantity:  180 Tab Refills:  1 Due 10/21 with supper * metFORMIN 500 mg tablet Commonly known as:  GLUCOPHAGE  
   
 TAKE ONE TABLET BY MOUTH TWICE DAILY WITH MEALS Quantity:  180 Tab Refills:  4  
     
   
   
   
  
 metroNIDAZOLE 1 % topical cream  
Commonly known as:  Lyndy Kohut Apply  to affected area two (2) times daily (with meals). Use a thin layer to affected areas after washing Quantity:  30 g Refills:  2  
     
   
   
   
  
 oxybutynin 5 mg tablet Commonly known as:  DITROPAN  
   
 TAKE ONE TABLET BY MOUTH ONCE DAILY Quantity:  90 Tab Refills:  0 Next dose 10/22  
   
   
   
  
 pantoprazole 40 mg tablet Commonly known as:  PROTONIX Dose:  40 mg Take 1 Tab by mouth daily. Quantity:  90 Tab Refills:  3 Nest dose 10/22  
   
   
   
  
 potassium chloride 10 mEq tablet Commonly known as:  KLOR-CON Dose:  10 mEq Take 1 Tab by mouth daily. Quantity:  90 Tab Refills:  1 Next 10/22 * Notice: This list has 6 medication(s) that are the same as other medications prescribed for you.  Read the directions carefully, and ask your doctor or other care provider to review them with you. STOP taking these medications   
 cephALEXin 500 mg capsule Commonly known as:  Aury Delvalle Where to Get Your Medications Information on where to get these meds will be given to you by the nurse or doctor. ! Ask your nurse or doctor about these medications  
  levoFLOXacin 750 mg tablet

## 2017-10-19 NOTE — ED PROVIDER NOTES
HPI Comments: 4:17 AM  The patient is a 79 y.o. Female, with hx of lymphedema , knee replacement, and bone graphs, who presents with generalized body ache and fever x 2 days. Pt also presents with pain below right shoulder blade on posterior side, with right sided thoracic back pain, and with bilateral lower extremity pain and edema. She has difficulty sitting and experiences pain with walking secondary to her sx. Cough, N/V/D, and urinary sx such as dysuria and frequency are denied. No other complaints at this time. The history is provided by the patient. No  was used. Past Medical History:   Diagnosis Date    Degenerative disk disease     Depression     Depression     Diffuse idiopathic skeletal hyperostosis     Fracture of leg     Gallstone     Lymphedema of leg 1975    bilateral lower legs    Morbid obesity ([address]     Pulmonary arterial hypertension (HCC)     MANPREET (stress urinary incontinence, female)        Past Surgical History:   Procedure Laterality Date    HX HYSTERECTOMY      HX KNEE REPLACEMENT      HX ORTHOPAEDIC      right shoulder and right knee replaced x 2, left knee replaced as well         Family History:   Problem Relation Age of Onset    Heart Disease Father     Diabetes Father        Social History     Social History    Marital status:      Spouse name: N/A    Number of children: N/A    Years of education: N/A     Occupational History    Not on file. Social History Main Topics    Smoking status: Never Smoker    Smokeless tobacco: Never Used    Alcohol use No    Drug use: No    Sexual activity: Not on file     Other Topics Concern    Not on file     Social History Narrative         ALLERGIES: Adhesive; Lisinopril; and Nitrofurantoin    Review of Systems   Constitutional: Positive for fever. Positive for generalized body aches   Respiratory: Negative for cough.     Cardiovascular:        Positive for bilateral lower extremity edema. Gastrointestinal: Negative for diarrhea, nausea and vomiting. Genitourinary: Positive for frequency. Negative for dysuria. Musculoskeletal:        Positive for pain below right shoulder blade, on posterior side  Positive for right sided thoracic back pain  Positive for bilateral lower extremity pain    All other systems reviewed and are negative. Vitals:    10/19/17 0515 10/19/17 0530 10/19/17 0545 10/19/17 0600   BP:  119/43 115/48    Pulse:  62 60 75   Resp:  15 15 18   Temp: 99.2 °F (37.3 °C)      SpO2:  96% 95% 96%   Weight:       Height:                Physical Exam   Constitutional:   General:  Well-developed, well-nourished, obese, warm to touch nontoxic nondiaphoretic. Head:  Normocephalic atraumatic. Eyes:  Pupils midrange extraocular movements intact. No pallor or conjunctival injection. Nose:  No rhinorrhea, inspection grossly normal.    Ears:  Grossly normal to inspection, no discharge. Mouth:  Mucous membranes moist, no appreciable intraoral lesion. Neck/Back:  Trachea midline, no asymmetry. Diffuse back pain mostly paraspinal RIGHT thoracic and lumbar no overlying skin changes  Chest:  Grossly normal inspection, symmetric chest rise. Pulmonary:  Clear to auscultation bilaterally no wheezes rhonchi or rales. Cardiovascular:  S1-S2 no murmurs rubs or gallops. Abdomen: Soft, nontender, nondistended no guarding rebound or peritoneal signs. Extremities:   edematous bilateral lower extremities, LEFT lower extremity with erythema mostly posterior and warmth, RIGHT lower extremity marked erythema and no warmth compared to LEFT. 2+ dorsalis pedis pulses  Small linear skin breakdown in the popliteal fossa on the LEFT leg  Neurologic:  Alert and oriented no appreciable focal neurologic deficit. Skin:  Warm and dry  Psychiatric:  Grossly normal mood and affect. Nursing note reviewed, vital signs reviewed.           MDM  Number of Diagnoses or Management Options  Acute pyelonephritis:   Kidney stone:   Sepsis, due to unspecified organism [facility]):   Diagnosis management comments: ED course:  Patient presents with myalgias, fever of 103 at home, here she complains mostly of myalgias without abdominal complaints no cardiopulmonary symptoms but she does have what seems to be cellulitis of her LEFT lower extremity. She is febrile and has a heart rate about 90, those are her to SRIS criteria with a suspected cellulitis, sepsis order set was placed, start empiric antibiotics for a skin soft tissue infection. EKG done at 0325 hrs.: Normal sinus rhythm heart rate is 89 RIGHT branch block morphology QRS duration is 132, there is appropriate ST discordance    Patient presents febrile tachycardia, suspected infectious etiology given her myalgias but no clear pneumonia or UTI symptoms expressed, she has a clinical cellulitis of her LEFT lower extremity which may also be a stasis dermatitis but given her fever will treat with antibiotics and start empiric vancomycin/ceftriaxone. We'll proceed with laboratory evaluation, hydrate here treat pain and symptoms    Labs: Largely unremarkable, her lactic acid was not elevated blood cell count is not elevated    Urinalysis clearly infected    Given her back pain and new report of RIGHT hip pain we'll plan for CT to evaluate for kidney stone    CT per radiology nonobstructive 2 mm right-sided UVJ stone    Given her fever and urinary tract infection primary concern is pyelonephritis plus kidney stone is worrisome and should be admitted, she agreed      Patient's presentation, history, physical exam and laboratory evaluations were reviewed. I felt the patient would benefit from inpatient management and treatment. Consult:  Discussed care with Dr. Ivette Mccollum. Standard discussion; including history of patient's chief complaint, available diagnostic results, and treatment course. Patient was accepted to their service. Disposition:    Admitted to medicine service      Portions of this chart were created with Dragon medical speech to text program.   Unrecognized errors may be present. ED Course     CTD-CT ABD/PELV W/O CON  Preliminary read at 5:08:29am     Findings:   1. 2mm nonobstructing calc near the right UVJ without evidence of hydroureter or hydronephrosis. 2. Symmetric perinephric stranding favored to represent senescent changes. 3. Increased number of non-pathologically enlarged pericaval and periaortic lymph nodes. 4. Fat containing umbilical hernia  5. Hysterectomy  6. Degenerative changes of the thoracolumbar spine. 7. Mild atherosclerosis    XR chest port  Preliminary read by Dr. Marquise Barbour  No infiltrate    Procedures      Scribe Attestation      Shara Bamberger acting as a scribe for and in the presence of Li Vicente MD      October 19, 2017 at 4:28 AM       Provider Attestation:      I personally performed the services described in the documentation, reviewed the documentation, as recorded by the scribe in my presence, and it accurately and completely records my words and actions.  October 19, 2017 at 4:28 AM - Li Vicente MD

## 2017-10-19 NOTE — PROGRESS NOTES
[facility]  Hospitalist Division        Inpatient Daily Progress Note    Daily progress Note    Patient: Lisandra Brown MRN: [id number]  CSN: [id number]    YOB: 1950  Age: 79 y.o. Sex: female    DOA: 10/19/2017 LOS:  LOS: 0 days                    Chief Complaint:  Fatigue, weakness       Subjective:      Feeling much better overall. Denies chest pain or shortness of breath. In NAD. Objective:      Visit Vitals    /78 (BP 1 Location: Left arm, BP Patient Position: At rest)    Pulse 64    Temp 97.5 °F (36.4 °C)    Resp 16    Ht 5' (1.524 m)    Wt 136.4 kg (300 lb 12.8 oz)    SpO2 98%    BMI 58.75 kg/m2           Physical Exam:  General appearance: alert, cooperative, no distress, appears stated age  Lungs: clear to auscultation bilaterally, no wheezes or rhonchi   Heart: regular rate and rhythm, S1, S2 normal, no murmur, click, rub or gallop  Abdomen: soft, non tender, non distended.  Normoactive bowel sounds  Extremities: BLE lymphedema   Skin: Skin color, texture, turgor normal. No rashes or lesions  Neurologic: moves all 4 extremities   PSY: Mood and affect normal, appropriately behaved      Intake and Output:  Current Shift:  10/19 0701 - 10/19 1900  In: 0   Out: 50 [Urine:50]  Last three shifts:       Recent Results (from the past 24 hour(s))   EKG, 12 LEAD, INITIAL    Collection Time: 10/19/17  3:25 AM   Result Value Ref Range    Ventricular Rate 89 BPM    Atrial Rate 89 BPM    P-R Interval 154 ms    QRS Duration 132 ms    Q-T Interval 384 ms    QTC Calculation (Bezet) 467 ms    Calculated P Axis 22 degrees    Calculated R Axis -7 degrees    Calculated T Axis 23 degrees    Diagnosis       Normal sinus rhythm  Right bundle branch block  Abnormal ECG  When compared with ECG of 07-JAN-2015 09:29,  No significant change was found  Confirmed by Brooklyn Lim ([id number]) on 10/19/2017 9:37:59 AM     CULTURE, BLOOD    Collection Time: 10/19/17  3:30 AM   Result Value Ref Range    Special Requests: NO SPECIAL REQUESTS      Culture result: NO GROWTH AFTER 3 HOURS     CULTURE, BLOOD    Collection Time: 10/19/17  3:45 AM   Result Value Ref Range    Special Requests: NO SPECIAL REQUESTS      Culture result: NO GROWTH AFTER 3 HOURS     METABOLIC PANEL, COMPREHENSIVE    Collection Time: 10/19/17  3:45 AM   Result Value Ref Range    Sodium 140 136 - 145 mmol/L    Potassium 3.6 3.5 - 5.5 mmol/L    Chloride 107 100 - 108 mmol/L    CO2 25 21 - 32 mmol/L    Anion gap 8 3.0 - 18 mmol/L    Glucose 152 (H) 74 - 99 mg/dL    BUN 11 7.0 - 18 MG/DL    Creatinine 0.79 0.6 - 1.3 MG/DL    BUN/Creatinine ratio 14 12 - 20      GFR est AA >60 >60 ml/min/1.73m2    GFR est non-AA >60 >60 ml/min/1.73m2    Calcium 8.9 8.5 - 10.1 MG/DL    Bilirubin, total 2.3 (H) 0.2 - 1.0 MG/DL    ALT (SGPT) 13 13 - 56 U/L    AST (SGOT) 12 (L) 15 - 37 U/L    Alk. phosphatase 76 45 - 117 U/L    Protein, total 6.8 6.4 - 8.2 g/dL    Albumin 3.1 (L) 3.4 - 5.0 g/dL    Globulin 3.7 2.0 - 4.0 g/dL    A-G Ratio 0.8 0.8 - 1.7     CBC WITH AUTOMATED DIFF    Collection Time: 10/19/17  3:45 AM   Result Value Ref Range    WBC 11.3 4.6 - 13.2 K/uL    RBC 3.95 (L) 4.20 - 5.30 M/uL    HGB 12.0 12.0 - 16.0 g/dL    HCT 35.9 35.0 - 45.0 %    MCV 90.9 74.0 - 97.0 FL    MCH 30.4 24.0 - 34.0 PG    MCHC 33.4 31.0 - 37.0 g/dL    RDW 13.5 11.6 - 14.5 %    PLATELET 486 893 - 702 K/uL    MPV 10.3 9.2 - 11.8 FL    NEUTROPHILS 87 (H) 40 - 73 %    LYMPHOCYTES 4 (L) 21 - 52 %    MONOCYTES 9 3 - 10 %    EOSINOPHILS 0 0 - 5 %    BASOPHILS 0 0 - 2 %    ABS. NEUTROPHILS 9.8 (H) 1.8 - 8.0 K/UL    ABS. LYMPHOCYTES 0.5 (L) 0.9 - 3.6 K/UL    ABS. MONOCYTES 1.1 0.05 - 1.2 K/UL    ABS. EOSINOPHILS 0.0 0.0 - 0.4 K/UL    ABS.  BASOPHILS 0.0 0.0 - 0.06 K/UL    DF AUTOMATED     CK    Collection Time: 10/19/17  3:45 AM   Result Value Ref Range    CK 43 26 - 192 U/L   HEMOGLOBIN A1C WITH EAG    Collection Time: 10/19/17  3:45 AM   Result Value Ref Range    Hemoglobin A1c 5.6 4.2 - 5.6 %    Est. average glucose 114 mg/dL   POC LACTIC ACID    Collection Time: 10/19/17  3:49 AM   Result Value Ref Range    Lactic Acid (POC) 1.0 0.4 - 2.0 mmol/L   URINALYSIS W/ RFLX MICROSCOPIC    Collection Time: 10/19/17  4:00 AM   Result Value Ref Range    Color DARK YELLOW      Appearance CLOUDY      Specific gravity 1.025 1.005 - 1.030      pH (UA) 6.0 5.0 - 8.0      Protein TRACE (A) NEG mg/dL    Glucose NEGATIVE  NEG mg/dL    Ketone 15 (A) NEG mg/dL    Bilirubin NEGATIVE  NEG      Blood MODERATE (A) NEG      Urobilinogen 1.0 0.2 - 1.0 EU/dL    Nitrites POSITIVE (A) NEG      Leukocyte Esterase LARGE (A) NEG     URINE MICROSCOPIC ONLY    Collection Time: 10/19/17  4:00 AM   Result Value Ref Range    WBC TOO NUMEROUS TO COUNT 0 - 4 /hpf    RBC 4 to 10 0 - 5 /hpf    Epithelial cells 4+ 0 - 5 /lpf    Bacteria 4+ (A) NEG /hpf   GLUCOSE, POC    Collection Time: 10/19/17 11:51 AM   Result Value Ref Range    Glucose (POC) 109 70 - 110 mg/dL           Lab Results   Component Value Date/Time    Glucose 152 10/19/2017 03:45 AM    Glucose CANCELED 08/07/2017 09:10 AM    Glucose 75 01/25/2017 09:59 AM    Glucose 78 07/26/2016 08:45 AM    Glucose 84 01/19/2016 08:30 AM        Assessment/Plan:     Patient Active Problem List   Diagnosis Code    Pulmonary HTN I27.20    Snoring R06.83    Morbid obesity ([facility]) E66.01    Stress incontinence N39.3    Depression F32.9    Impaired hearing H91.90    Sjogrens syndrome ([facility]) M35.00    Leg swelling M79.89    Cataract H26.9    Urinary incontinence R32    Epulis K06.8    HTN (hypertension), benign I10    Morbid obesity due to excess calories ([facility]) E66.01    Advance care planning Z71.89    Gastroesophageal reflux disease without esophagitis K21.9    Osteoporosis M81.0    Memory impairment R41.3    UTI (urinary tract infection) N39.0       A/P:  UTI: Rocephin.  CT abd noted for 2 mm calcification in the right lower pelvis near the UVJ- Urology consulted.  Urine culture pending  HTN: losartan  Prediabetes: SSI   Hypercholesterolemia: atorvastatin   Stress incontinence: oxybutynin   DVT prophylaxis: Heparin   Morbid obesity- BMI 58.6  PT/OT eval and treat         HALIE Carter 83  Pager:  [phone]  Office:  [phone]

## 2017-10-19 NOTE — PROGRESS NOTES
[facility]   Discharge Planning/ Assessment    Reasons for Intervention: Chart reviewed and pt verified demographics. Her  Galo Swartz [phone] is her emergency contact, but he is legally blind and is unable to drive pt home. She says she lives with  and tries to be independent with ADL, but has  Problems doing all the chores. She has had PT both at home and outpt, but it was not in the past year. She would like Home Health PT to help her to get better at walking. At home she has a walker and cane to assist. Plan is to go home with [facility] PT.      High Risk Criteria  [] Yes  [x]No   Physician Referral  [] Yes  [x]No        Date    Nursing Referral  [] Yes  [x]No        Date    Patient/Family Request  [] Yes  [x]No        Date       Resources:    Medicare  [x] Yes  []No   Medicaid  [] Yes  [x]No   No Resources  [] Yes  [x]No   Private Insurance  [] Yes  [x]No    Name/Phone Number    Other  [] Yes  [x]No        (i.e. Workman's Comp)         Prior Services:    Prior Services  [x] Yes  []No   Home Health  [x] Yes  []No   [address] [place]  [] Yes  [x]No        Number of [address]  [] Yes  [x]No       Meals on Wheels  [] Yes  [x]No   Office on Aging  [] Yes  [x]No   Transportation Services  [] Yes  [x]No   Nursing Home  [] Yes  [x]No        Nursing Home Name    [address]  [] Yes  [x]No        [address] Name    Other       Information Source:      Information obtained from  [x] Patient  [] Parent   [] [address]  [] Child  [] Spouse   [] Significant Other/Partner   [] Friend      [] EMS    [] Nursing Home Chart          [] Other:   Chart Review  [x] Yes  []No     Family/Support System:    Patient lives with  [] Alone    [x] Spouse   [] Significant Other  [] Children  [] Caretaker   [] Parent  [] Sibling     [] Other       Other Support System:    Is the patient responsible for care of others  [] Yes  [x]No   Information of person caring for patient on  discharge    Managers financial affairs independently  [x] Yes  []No   If no, explain:      Status Prior to Admission:    Mental Status  [x] Awake  [] Alert  [] Oriented  [] Quiet/Calm [] Lethargic/Sedated   [] Disoriented  [] Restless/Anxious  [] Combative   Personal Care  [] Dependent  [x] [address]  [] Requires Assistance   Meal Preparation Ability  [x] Independent   [] Standby Assistance   [] Minimal Assistance   [] Moderate Assistance  [] Maximum Assistance     [] Total Assistance   Chores  [x] Independent with Chores   [] N/A Nursing Home Resident   [] Requires Assistance   Bowel/Bladder  [x] Continent  [] Catheter  [] Incontinent  [] Ostomy Self-Care    [] Urine Diversion Self-Care  [] Maximum Assistance     [] Total Assistance   Number of Persons needed for assistance    DME at home  [] Luana Carter  [] Yumiko Padilla, Straight   [] Commode    [] Bathroom/Grab Bars  [] Hospital Bed  [] Nebulizer  [] Oxygen           [] Raised Toilet Seat  [] Shower Chair  [] Side Rails for Bed   [] Tub Transfer Bench   [] Neeru Clipper  [] Esteban Fernandez      [] Other:   Vendor      Treatment Presently Receiving:    Current Treatments  [] Chemotherapy  [] Dialysis  [] Insulin  [] IVAB [x] IVF   [] O2  [] PCA   [] PT   [] RT   [] Tube Feedings   [] Wound Care     Psychosocial Evaluation:    Verbalized Knowledge of Disease Process  [] Patient  []Family   Coping with Disease Process  [] Patient  []Family   Requires Further Counseling Coping with Disease Process  [] Patient  []Family     Identified Projected Needs:    Home Health Aid  [] Yes  [x]No   Transportation  [] Yes  [x]No   Education  [] Yes  [x]No        Specific Education     Financial Counseling  [] Yes  [x]No   Inability to Care for Self/Will Require 24 hour care  [] Yes  [x]No   Pain Management  [] Yes  [x]No   Home Infusion Therapy  [] Yes  [x]No   Oxygen Therapy  [] Yes  [x]No   DME  [] Yes  [x]No   Long Term Care Placement [] Yes  [x]No   Rehab  [] Yes  [x]No   Physical Therapy  [x] Yes  []No   Needs Anticipated At This Time  [x] Yes  []No     Intra-Hospital Referral:    [facility]  [] Yes  [x]No     [] Yes  [x]No   Patient Representative  [] Yes  [x]No   Staff for Teaching Needs  [] Yes  [x]No   Specialty Teaching Needs     Diabetic Educator  [] Yes  [x]No   Referral for Diabetic Educator Needed  [] Yes  [x]No  If Yes, place order for Nutritionist or Diabetic Consult     Tentative Discharge Plan:    Home with No Services  [] Yes  [x]No   Home with Home Health Follow-up  [x] Yes  []No        If Yes, specify type    Home Care Program  [] Yes  [x]No        If Yes, specify type    Meals on Wheels  [] Yes  [x]No   Office of Aging  [] Yes  [x]No   NHP  [] Yes  [x]No   Return to the Nursing Home  [] Yes  [x]No   Rehab Therapy  [x] Yes  []No   Acute Rehab  [] Yes  [x]No   Subacute Rehab  [] Yes  [x]No   Private Care  [] Yes  [x]No   Substance Abuse Referral  [] Yes  [x]No   Transportation  [] Yes  [x]No   Chore Service  [] Yes  [x]No   Inpatient Hospice  [] Yes  [x]No   OP RT  [] Yes  [x] No   OP Hemo  [] Yes  [x] No   OP PT  [] Yes  [x]No   Support Group  [] Yes  [x]No   Reach to Recovery  [] Yes  [x]No   OP Oncology Clinic  [] Yes  [x]No   Clinic Appointment  [] Yes  [x]No   DME  [] Yes  [x]No   Comments    Name of D/C Planner or  Given to Patient or Family Shahid Herrera. Timothy Gonzalez RN   Phone Number         Extension [phone]   Date 10/19/17   Time    If you are discharged home, whom do you designate to participate in your discharge plan and receive any information needed?      Enter name of designee Not sure who will drive        Phone # of designee         Address of designee         Updated         Patient refused to designate any           individual

## 2017-10-19 NOTE — ED TRIAGE NOTES
Presents with 3 day illness and fever at home. Complains of generalized body aches. Denies dysuria, nausea, vomiting, diarrhea and cough.

## 2017-10-19 NOTE — PROGRESS NOTES
conducted an initial consultation and Spiritual Assessment for Jasen Quick, who is a 79 y.o.,female. Patients Primary Language is: [place]. According to the patients EMR Hinduism Affiliation is: Buddhist. The reason the Patient came to the hospital is:   Patient Active Problem List    Diagnosis Date Noted    UTI (urinary tract infection) 10/19/2017    Memory impairment 08/07/2017    Osteoporosis 05/01/2017    Gastroesophageal reflux disease without esophagitis 10/26/2016    Advance care planning 10/03/2016    HTN (hypertension), benign 04/19/2016    Morbid obesity due to excess calories ([facility] 04/19/2016    Epulis 01/19/2016    Urinary incontinence 11/24/2015    Cataract 11/19/2015    Sjogrens syndrome ([facility] 03/18/2015    Leg swelling 03/18/2015    Pulmonary HTN 11/26/2014    Snoring 11/26/2014    Morbid obesity ([facility] 11/26/2014    Stress incontinence 11/26/2014    Depression 11/26/2014    Impaired hearing 11/26/2014        The  provided the following Interventions:  Initiated a relationship of care and support with patient in room 2105 around 1245 today. Listened as patient talked about her being here and her close friend who happen to be setting in the room as I visited. Patient in good spirits. No com[plaints at all. She asked about another  that used to work here but I did not know them. Provided information about Spiritual Care Services. Offered prayer and assurance of continued prayers on patients behalf. The following outcomes were achieved:  Patient shared limited information about her medical narrative and spiritual journey/beliefs. Patient processed feeling about current hospitalization. Patient expressed gratitude for pastoral care visit. Assessment:  Patient does not have any Denominational/cultural needs that will affect patients preferences in health care.   There are no further spiritual or Denominational issues which require Spiritual Care Services interventions at this time. Plan:  Chaplains will continue to follow and will provide pastoral care on an as needed/requested basis    . [place]   Spiritual Care   [phone]

## 2017-10-19 NOTE — Clinical Note
Status[de-identified] Inpatient [101] Type of Bed: Medical [8] Inpatient Hospitalization Certified Necessary for the Following Reasons: 9. Other (further clarification in H&P documentation) Admitting Diagnosis: UTI (urinary tract infection) [319089] Admitting Physician: Omayra Browne [[id number]] Attending Physician: Omayra Browne [[id number]] Estimated Length of Stay: 2 Midnights Discharge Plan[de-identified] Home with Office Follow-up

## 2017-10-19 NOTE — PROGRESS NOTES
1504 massiel paged for stronger pain med, tylenol has not relieved pt's headache. She said she would put something in.     1610 massiel paged again. Order for 2mg morphine q4prn obtained. 1915 Bedside and Verbal shift change report given to viola ford rn (oncoming nurse) by Ever Rand RN   (offgoing nurse). Report included the following information Kardex, MAR and Recent Results.

## 2017-10-19 NOTE — IP AVS SNAPSHOT
[address] [place] [address] 
[phone] Patient: Shawn Albright MRN: [id number] TKO:[id number]/12/[id number] You are allergic to the following Allergen Reactions Adhesive Other (comments) Skin burns - red spots Lisinopril Other (comments) Nitrofurantoin Other (comments) Recent Documentation Height Weight BMI OB Status Smoking Status 1.524 m 139.7 kg 60.15 kg/m2 Hysterectomy Never Smoker Unresulted Labs Order Current Status CULTURE, BLOOD Preliminary result Emergency Contacts Name Discharge Info Relation Home Work Mobile Jr Ku DISCHARGE CAREGIVER [3] Spouse [3] [phone] About your hospitalization You were admitted on:  October 19, 2017 You last received care in the:  [address] [facility] You were discharged on:  October 21, 2017 Unit phone number:  [phone] Why you were hospitalized Your primary diagnosis was:  Not on File Your diagnoses also included:  Uti (Urinary Tract Infection) Providers Seen During Your Hospitalizations Provider Role Specialty Primary office phone Sophia Hilliard MD Attending Provider Emergency Medicine [phone] Drea Grover DO Attending Provider Internal Medicine [phone] Nikolai Harp MD Attending Provider Internal Medicine [phone] Surinder Bonner MD Attending Provider Internal Medicine [phone] Your Primary Care Physician (PCP) Primary Care Physician Office Phone Office Fax [facility] [phone] Follow-up Information Follow up With Details Comments Contact Info Shraddha Hatfield MD   [address] [place] [address] [phone] Current Discharge Medication List  
  
START taking these medications Dose & Instructions Dispensing Information Comments Morning Noon Evening Bedtime  
 levoFLOXacin 750 mg tablet Commonly known as:  Lázaro Kern Dose:  750 mg Take 1 Tab by mouth daily. Quantity:  7 Tab Refills:  0 Due 10/21 CONTINUE these medications which have NOT CHANGED Dose & Instructions Dispensing Information Comments Morning Noon Evening Bedtime  
 acetaminophen-codeine 300-30 mg per tablet Commonly known as:  TYLENOL-CODEINE #3 Dose:  1 Tab Take 1 Tab by mouth every six (6) hours as needed for Pain. Max Daily Amount: 4 Tabs. Quantity:  40 Tab Refills:  0  
     
   
   
   
  
 albuterol 90 mcg/actuation inhaler Commonly known as:  PROVENTIL HFA, VENTOLIN HFA, PROAIR HFA Dose:  2 Puff Take 2 Puffs by inhalation every six (6) hours as needed for Wheezing. Quantity:  1 Inhaler Refills:  3  
     
   
   
   
  
 alendronate 70 mg tablet Commonly known as:  FOSAMAX TAKE ONE TABLET BY MOUTH EVERY SUNDAY Quantity:  4 Tab Refills:  0  
     
   
   
   
  
 atorvastatin 10 mg tablet Commonly known as:  LIPITOR Dose:  10 mg Take 1 Tab by mouth daily. Quantity:  90 Tab Refills:  0 Next due 10/22 DULoxetine 60 mg capsule Commonly known as:  CYMBALTA Dose:  60 mg Take 1 Cap by mouth daily. Quantity:  90 Cap Refills:  3 Next dose 10/22  
   
   
   
  
 ergocalciferol 50,000 unit capsule Commonly known as:  ERGOCALCIFEROL Dose:  07430 Units Take 1 Cap by mouth every seven (7) days. Quantity:  4 Cap Refills:  12 Due 10/21 * furosemide 40 mg tablet Commonly known as:  LASIX One po daily Quantity:  30 Tab Refills:  3 Due 10/21 * furosemide 20 mg tablet Commonly known as:  LASIX TAKE ONE TABLET BY MOUTH ONCE DAILY Quantity:  90 Tab Refills:  1 Due 10/21 * losartan 25 mg tablet Commonly known as:  COZAAR  
   
 TAKE ONE TABLET BY MOUTH ONCE DAILY Quantity:  90 Tab Refills:  0 Next dose 10/22 * losartan 25 mg tablet Commonly known as:  COZAAR Dose:  25 mg Take 1 Tab by mouth daily. Quantity:  90 Tab Refills:  1  
     
   
   
   
  
 * metFORMIN 1,000 mg tablet Commonly known as:  GLUCOPHAGE Dose:  1000 mg Take 1 Tab by mouth two (2) times daily (with meals). Quantity:  180 Tab Refills:  1 Due 10/21 with supper * metFORMIN 500 mg tablet Commonly known as:  GLUCOPHAGE  
   
 TAKE ONE TABLET BY MOUTH TWICE DAILY WITH MEALS Quantity:  180 Tab Refills:  4  
     
   
   
   
  
 metroNIDAZOLE 1 % topical cream  
Commonly known as:  Patrick Sours Apply  to affected area two (2) times daily (with meals). Use a thin layer to affected areas after washing Quantity:  30 g Refills:  2  
     
   
   
   
  
 oxybutynin 5 mg tablet Commonly known as:  DITROPAN  
   
 TAKE ONE TABLET BY MOUTH ONCE DAILY Quantity:  90 Tab Refills:  0 Next dose 10/22  
   
   
   
  
 pantoprazole 40 mg tablet Commonly known as:  PROTONIX Dose:  40 mg Take 1 Tab by mouth daily. Quantity:  90 Tab Refills:  3 Nest dose 10/22  
   
   
   
  
 potassium chloride 10 mEq tablet Commonly known as:  KLOR-CON Dose:  10 mEq Take 1 Tab by mouth daily. Quantity:  90 Tab Refills:  1 Next 10/22 * Notice: This list has 6 medication(s) that are the same as other medications prescribed for you. Read the directions carefully, and ask your doctor or other care provider to review them with you. STOP taking these medications   
 cephALEXin 500 mg capsule Commonly known as:  Miles Pollock Where to Get Your Medications Information on where to get these meds will be given to you by the nurse or doctor. ! Ask your nurse or doctor about these medications  
  levoFLOXacin 750 mg tablet Discharge Instructions DISCHARGE SUMMARY from Nurse The following personal items are in your possession at time of discharge: 
 
Dental Appliances: None Visual Aid: Glasses, With patient Home Medications: None Jewelry: None Clothing: None Other Valuables: [employer] PATIENT INSTRUCTIONS: 
 
 
F-face looks uneven A-arms unable to move or move unevenly S-speech slurred or non-existent T-time-call 911 as soon as signs and symptoms begin-DO NOT go Back to bed or wait to see if you get better-TIME IS BRAIN. Warning Signs of HEART ATTACK Call 911 if you have these symptoms: 
? Chest discomfort. Most heart attacks involve discomfort in the center of the chest that lasts more than a few minutes, or that goes away and comes back. It can feel like uncomfortable pressure, squeezing, fullness, or pain. ? Discomfort in other areas of the upper body. Symptoms can include pain or discomfort in one or both arms, the back, neck, jaw, or stomach. ? Shortness of breath with or without chest discomfort. ? Other signs may include breaking out in a cold sweat, nausea, or lightheadedness. Don't wait more than five minutes to call [address]! Fast action can save your life. Calling 911 is almost always the fastest way to get lifesaving treatment. Emergency Medical Services staff can begin treatment when they arrive  up to an hour sooner than if someone gets to the hospital by car. The discharge information has been reviewed with the patient. The patient verbalized understanding.  
 
Discharge medications reviewed with the patient and appropriate educational materials and side effects teaching were provided. [employer] Activation Thank you for requesting access to [employer]. Please follow the instructions below to securely access and download your online medical record. [employer] allows you to send messages to your doctor, view your test results, renew your prescriptions, schedule appointments, and more. How Do I Sign Up? 1. In your internet browser, go to [email or web address] 
2. Click on the First Time User? Click Here link in the Sign In box. You will be redirect to the New Member Sign Up page. 3. Enter your [employer] Access Code exactly as it appears below. You will not need to use this code after youve completed the sign-up process. If you do not sign up before the expiration date, you must request a new code. [employer] Access Code: Activation code not generated Current [employer] Status: Active (This is the date your [employer] access code will ) 4. Enter the last four digits of your Social Security Number (xxxx) and Date of Birth (mm/dd/yyyy) as indicated and click Submit. You will be taken to the next sign-up page. 5. Create a [employer] ID. This will be your [employer] login ID and cannot be changed, so think of one that is secure and easy to remember. 6. Create a [employer] password. You can change your password at any time. 7. Enter your Password Reset Question and Answer. This can be used at a later time if you forget your password. 8. Enter your e-mail address. You will receive e-mail notification when new information is available in [address] 9. Click Sign Up. You can now view and download portions of your medical record. 10. Click the Download Summary menu link to download a portable copy of your medical information. Additional Information If you have questions, please visit the Frequently Asked Questions section of the [employer] website at [email or web address] Remember, [employer] is NOT to be used for urgent needs. For medical emergencies, dial 911. Patient armband removed and shredded Discharge Orders None [employer] Announcement We are excited to announce that we are making your provider's discharge notes available to you in [employer]. You will see these notes when they are completed and signed by the physician that discharged you from your recent hospital stay. If you have any questions or concerns about any information you see in [employer], please call the Health Information Department where you were seen or reach out to your Primary Care Provider for more information about your plan of care. Introducing [facility]! Dear Karena Due: 
Thank you for requesting a [employer] account. Our records indicate that you already have an active [employer] account. You can access your account anytime at [email or web address] Did you know that you can access your hospital and ER discharge instructions at any time in [employer]? You can also review all of your test results from your hospital stay or ER visit. Additional Information If you have questions, please visit the Frequently Asked Questions section of the [employer] website at [email or web address] Remember, [employer] is NOT to be used for urgent needs. For medical emergencies, dial 911. Now available from your iPhone and Android! General Information Please provide this summary of care documentation to your next provider. Patient Signature:  ____________________________________________________________ Date:  ____________________________________________________________  
  
Milton Schneider Provider Signature:  ____________________________________________________________ Date:  ____________________________________________________________

## 2017-10-19 NOTE — ACP (ADVANCE CARE PLANNING)
Patient has designated ___________Husband_____________ to participate in his/her discharge plan and to receive any needed information.      Name: Amoldy Phoenix  Address:  Phone number: [phone]

## 2017-10-19 NOTE — INTERDISCIPLINARY ROUNDS
IDR Summary      Patient: Concepcion Silva MRN: [id number]    Age: 79 y.o.  : 1950     Admit Diagnosis: UTI (urinary tract infection)  UTI (urinary tract infection)      Problems pertinent to hospital stay:     Consults: P. T and Case Management     Testing due for patient today? YES    Nutrition plan:Yes     Mobility needs: Yes      Lines/Tubes:   IV: YES  Needed: YES  Mcdonald: NO  Needed:NO  Central Line: NO Needed: NO      VTE Prophylaxis: Chemical    Influenza Vaccine received?  YES        Care Management:  Discharge plan: Home with [facility]    PCP: Mali Abernathy MD    : NO  Financial concerns:No   Interventions:       LOS: 0 days     Expected days until discharge: 1-2 days        Signed:     JAIME Ragland-BC  [address]  Hospitalist Division  Pager:  [phone]  Office:  [phone]

## 2017-10-19 NOTE — PROGRESS NOTES
Pharmacy Dosing Services: Vancomycin    Indication: skin and soft tissue infection     Day of therapy: 0    Other Antimicrobials (Include dose, start day & day of therapy):  Ceftriaxone 1g IV q24h (started 10/19)      Loading dose (date given): 2g IV  Current Maintenance dose: new start    Goal Vancomycin Level: 15-20  (Trough 15-20 for most infections, 20 for meningitis/osteomyelitis, pre-HD level ~25)    Vancomycin Level (if drawn): none     Significant Cultures: 10/19 blood- pending    Renal function stable? (unstable defined as SCr increase of 0.5 mg/dL or > 50% increase from baseline, whichever is greater) (Y/N): Y     CAPD, Hemodialysis or Renal Replacement Therapy (Y/N): N     Recent Labs      10/19/17   0345   CREA  0.79   BUN  11   WBC  11.3     Temp (24hrs), Av.1 °F (37.8 °C), Min:99.2 °F (37.3 °C), Max:100.9 °F (38.3 °C)    Creatinine Clearance (Creatinine Clearance (ml/min)): 88.9 ml/min     Regimen assessment: Load 2 g  Maintenance dose: 1250mg IV q12h; estimated trough ~17  Next scheduled level: 10/20 @ 1700       Pharmacy will follow daily and adjust medications as appropriate for renal function and/or serum levels.     Thank you,  Ashish Hughes

## 2017-10-20 LAB
ALBUMIN SERPL-MCNC: 2.8 G/DL (ref 3.4–5)
ALBUMIN/GLOB SERPL: 1 {RATIO} (ref 0.8–1.7)
ALP SERPL-CCNC: 67 U/L (ref 45–117)
ALT SERPL-CCNC: 15 U/L (ref 13–56)
ANION GAP SERPL CALC-SCNC: 9 MMOL/L (ref 3–18)
AST SERPL-CCNC: 14 U/L (ref 15–37)
BILIRUB SERPL-MCNC: 1.2 MG/DL (ref 0.2–1)
BUN SERPL-MCNC: 8 MG/DL (ref 7–18)
BUN/CREAT SERPL: 13 (ref 12–20)
CALCIUM SERPL-MCNC: 8 MG/DL (ref 8.5–10.1)
CHLORIDE SERPL-SCNC: 110 MMOL/L (ref 100–108)
CHOLEST SERPL-MCNC: 132 MG/DL
CO2 SERPL-SCNC: 23 MMOL/L (ref 21–32)
CREAT SERPL-MCNC: 0.64 MG/DL (ref 0.6–1.3)
ERYTHROCYTE [DISTWIDTH] IN BLOOD BY AUTOMATED COUNT: 14 % (ref 11.6–14.5)
GLOBULIN SER CALC-MCNC: 2.8 G/DL (ref 2–4)
GLUCOSE BLD STRIP.AUTO-MCNC: 115 MG/DL (ref 70–110)
GLUCOSE BLD STRIP.AUTO-MCNC: 85 MG/DL (ref 70–110)
GLUCOSE BLD STRIP.AUTO-MCNC: 91 MG/DL (ref 70–110)
GLUCOSE BLD STRIP.AUTO-MCNC: 96 MG/DL (ref 70–110)
GLUCOSE BLD STRIP.AUTO-MCNC: 98 MG/DL (ref 70–110)
GLUCOSE SERPL-MCNC: 129 MG/DL (ref 74–99)
HCT VFR BLD AUTO: 31.2 % (ref 35–45)
HDLC SERPL-MCNC: 72 MG/DL (ref 40–60)
HDLC SERPL: 1.8 {RATIO} (ref 0–5)
HGB BLD-MCNC: 10.1 G/DL (ref 12–16)
LDLC SERPL CALC-MCNC: 41.4 MG/DL (ref 0–100)
LIPID PROFILE,FLP: ABNORMAL
MCH RBC QN AUTO: 30.1 PG (ref 24–34)
MCHC RBC AUTO-ENTMCNC: 32.4 G/DL (ref 31–37)
MCV RBC AUTO: 92.9 FL (ref 74–97)
PLATELET # BLD AUTO: 143 K/UL (ref 135–420)
PMV BLD AUTO: 10.5 FL (ref 9.2–11.8)
POTASSIUM SERPL-SCNC: 3.3 MMOL/L (ref 3.5–5.5)
PROT SERPL-MCNC: 5.6 G/DL (ref 6.4–8.2)
RBC # BLD AUTO: 3.36 M/UL (ref 4.2–5.3)
SODIUM SERPL-SCNC: 142 MMOL/L (ref 136–145)
TRIGL SERPL-MCNC: 93 MG/DL (ref ?–150)
VLDLC SERPL CALC-MCNC: 18.6 MG/DL
WBC # BLD AUTO: 5.8 K/UL (ref 4.6–13.2)

## 2017-10-20 PROCEDURE — 74011250637 HC RX REV CODE- 250/637: Performed by: NURSE PRACTITIONER

## 2017-10-20 PROCEDURE — 74011250636 HC RX REV CODE- 250/636: Performed by: HOSPITALIST

## 2017-10-20 PROCEDURE — 65270000029 HC RM PRIVATE

## 2017-10-20 PROCEDURE — 36415 COLL VENOUS BLD VENIPUNCTURE: CPT | Performed by: HOSPITALIST

## 2017-10-20 PROCEDURE — 97162 PT EVAL MOD COMPLEX 30 MIN: CPT

## 2017-10-20 PROCEDURE — 97530 THERAPEUTIC ACTIVITIES: CPT

## 2017-10-20 PROCEDURE — 74011250637 HC RX REV CODE- 250/637: Performed by: HOSPITALIST

## 2017-10-20 PROCEDURE — 82962 GLUCOSE BLOOD TEST: CPT

## 2017-10-20 PROCEDURE — 80053 COMPREHEN METABOLIC PANEL: CPT | Performed by: HOSPITALIST

## 2017-10-20 PROCEDURE — 74011000258 HC RX REV CODE- 258: Performed by: EMERGENCY MEDICINE

## 2017-10-20 PROCEDURE — 77030020263 HC SOL INJ SOD CL0.9% LFCR 1000ML

## 2017-10-20 PROCEDURE — 85027 COMPLETE CBC AUTOMATED: CPT | Performed by: HOSPITALIST

## 2017-10-20 PROCEDURE — 74011250636 HC RX REV CODE- 250/636: Performed by: EMERGENCY MEDICINE

## 2017-10-20 PROCEDURE — 74011000250 HC RX REV CODE- 250: Performed by: INTERNAL MEDICINE

## 2017-10-20 PROCEDURE — 80061 LIPID PANEL: CPT | Performed by: HOSPITALIST

## 2017-10-20 RX ORDER — HYDROMORPHONE HYDROCHLORIDE 1 MG/ML
0.5 INJECTION, SOLUTION INTRAMUSCULAR; INTRAVENOUS; SUBCUTANEOUS
Status: DISCONTINUED | OUTPATIENT
Start: 2017-10-20 | End: 2017-10-21 | Stop reason: HOSPADM

## 2017-10-20 RX ORDER — POTASSIUM CHLORIDE 20 MEQ/1
40 TABLET, EXTENDED RELEASE ORAL
Status: COMPLETED | OUTPATIENT
Start: 2017-10-20 | End: 2017-10-20

## 2017-10-20 RX ADMIN — DULOXETINE HYDROCHLORIDE 60 MG: 60 CAPSULE, DELAYED RELEASE ORAL at 08:33

## 2017-10-20 RX ADMIN — MELATONIN TAB 3 MG 3 MG: 3 TAB at 22:37

## 2017-10-20 RX ADMIN — HEPARIN SODIUM 5000 UNITS: 5000 INJECTION, SOLUTION INTRAVENOUS; SUBCUTANEOUS at 17:26

## 2017-10-20 RX ADMIN — ALBUTEROL SULFATE 2.5 MG: 2.5 SOLUTION RESPIRATORY (INHALATION) at 02:14

## 2017-10-20 RX ADMIN — POTASSIUM CHLORIDE 10 MEQ: 750 TABLET, FILM COATED, EXTENDED RELEASE ORAL at 08:33

## 2017-10-20 RX ADMIN — ACETAMINOPHEN 650 MG: 325 TABLET, FILM COATED ORAL at 02:04

## 2017-10-20 RX ADMIN — ATORVASTATIN CALCIUM 10 MG: 10 TABLET, FILM COATED ORAL at 08:33

## 2017-10-20 RX ADMIN — CEFTRIAXONE 1 G: 1 INJECTION, POWDER, FOR SOLUTION INTRAMUSCULAR; INTRAVENOUS at 04:26

## 2017-10-20 RX ADMIN — SODIUM CHLORIDE 75 ML/HR: 900 INJECTION, SOLUTION INTRAVENOUS at 20:17

## 2017-10-20 RX ADMIN — LOSARTAN POTASSIUM 25 MG: 50 TABLET ORAL at 08:33

## 2017-10-20 RX ADMIN — HEPARIN SODIUM 5000 UNITS: 5000 INJECTION, SOLUTION INTRAVENOUS; SUBCUTANEOUS at 02:04

## 2017-10-20 RX ADMIN — PANTOPRAZOLE SODIUM 40 MG: 40 TABLET, DELAYED RELEASE ORAL at 08:33

## 2017-10-20 RX ADMIN — HEPARIN SODIUM 5000 UNITS: 5000 INJECTION, SOLUTION INTRAVENOUS; SUBCUTANEOUS at 08:36

## 2017-10-20 RX ADMIN — OXYBUTYNIN CHLORIDE 5 MG: 5 TABLET ORAL at 17:26

## 2017-10-20 RX ADMIN — POTASSIUM CHLORIDE 40 MEQ: 20 TABLET, EXTENDED RELEASE ORAL at 08:33

## 2017-10-20 NOTE — ROUTINE PROCESS
Bedside and Verbal shift change report given to Mayra Ramirez (oncoming nurse) by Floresita Mendiola RN (offgoing nurse). Report included the following information SBAR, Kardex, Intake/Output and Accordion.

## 2017-10-20 NOTE — PROGRESS NOTES
Problem: Falls - Risk of  Goal: *Absence of Falls  Document Coty Fall Risk and appropriate interventions in the flowsheet.    Outcome: Progressing Towards Goal  Fall Risk Interventions:  Mobility Interventions: Communicate number of staff needed for ambulation/transfer         Medication Interventions: Patient to call before getting OOB    Elimination Interventions: Call light in reach

## 2017-10-20 NOTE — INTERDISCIPLINARY ROUNDS
IDR Summary      Patient: Araceli Brand MRN: [id number]    Age: 79 y.o.  : 1950     Admit Diagnosis: UTI (urinary tract infection)  UTI (urinary tract infection)      Problems pertinent to hospital stay:     Consults: P. T and Case Management     Testing due for patient today? YES    Nutrition plan:Yes     Mobility needs: Yes      Lines/Tubes:   IV: YES  Needed: YES  Mcdonald: NO  Needed:NO  Central Line: NO Needed: NO      VTE Prophylaxis: Chemical    Influenza Vaccine received?  YES        Care Management:  Discharge plan: Home with [facility]    PCP: Manuel Andrews MD    : NO  Financial concerns:No   Interventions:       LOS: 1 days     Expected days until discharge: 2 days        Signed:     JAIME Adams-BC  [address]  Hospitalist Division  Pager:  [phone]  Office:  [phone]

## 2017-10-20 NOTE — CONSULTS
Urology Consult      Assessment:     Active Problems:    UTI (urinary tract infection) (10/19/2017)          Acute UTI. Hx of prior UTI's and saw Dr Waldemar Velasco in 2015  2 mm pelvic calcification, ? In ureter but no hydro and totally asymptomatic    Plan:     Cont appropriate ATB's for UTI  Low likelihood of ureteral stone. No need for intervention. Will need outpt FU on UTI and hematuria likely related to UTI    Signed By: Latricia Robles MD                         October 20, 2017  Demetrice Collins MD   Urologic Oncologist  Urology [facility] and Job Hess  Professor of Urology  [facility] [phone]      Subjective:     Date of Consultation:  October 20, 2017    Referring Physician: Dr Grant Farrell    Reason for Consultation:  UTI, ? Ureteral stone on CT    Patient Name: Farshad Pabon  MRN: [id number]    History of Present Illness:   Patient is a 79 y.o.  female who is being seen for UTI and ? Ureteral stone on CT. She was admitted to the hospital for UTI (urinary tract infection);UTI (urinary tract infection). Pt admitted with fever chills and found to have UTI. CT showed 2 mm ? Ureteral stone. Pt has no flank or abd pain. No hx of stones. She denies urinary frequency, urgency, dysuria, or hematuria. CT reviewed:  1. No obstructive uropathy. There is a 2 mm calcification in the right lower  pelvis near the UVJ. It is unclear if this is within the distal right ureter or  may represent a phlebolith due to lack of hydroureter.     Past Medical History:   Diagnosis Date    Degenerative disk disease     Depression     Depression     Diffuse idiopathic skeletal hyperostosis     Fracture of leg     Gallstone     Lymphedema of leg 1975    bilateral lower legs    Morbid obesity ([address]     Pulmonary arterial hypertension (HCC)     MANPREET (stress urinary incontinence, female)       Past Surgical History:   Procedure Laterality Date  HX HYSTERECTOMY      HX KNEE REPLACEMENT      HX ORTHOPAEDIC      right shoulder and right knee replaced x 2, left knee replaced as well      Family History   Problem Relation Age of Onset    Heart Disease Father     Diabetes Father       Social History   Substance Use Topics    Smoking status: Never Smoker    Smokeless tobacco: Never Used    Alcohol use No     Allergies   Allergen Reactions    Adhesive Other (comments)     Skin burns - red spots    Lisinopril Other (comments)    Nitrofurantoin Other (comments)      Prior to Admission medications    Medication Sig Start Date End Date Taking? Authorizing Provider   furosemide (LASIX) 20 mg tablet TAKE ONE TABLET BY MOUTH ONCE DAILY 10/17/17  Yes Rosalie Suarez MD   cephALEXin (KEFLEX) 500 mg capsule Take 1 Cap by mouth three (3) times daily for 10 days. 10/13/17 10/23/17 Yes Rosalie Suarez MD   metroNIDAZOLE (NORITATE) 1 % topical cream Apply  to affected area two (2) times daily (with meals). Use a thin layer to affected areas after washing 8/31/17  Yes Rosalie Suarez MD   metFORMIN (GLUCOPHAGE) 500 mg tablet TAKE ONE TABLET BY MOUTH TWICE DAILY WITH MEALS 8/16/17  Yes Rosalie Suarez MD   losartan (COZAAR) 25 mg tablet Take 1 Tab by mouth daily. 4/21/17  Yes Rosalie Suarez MD   oxybutynin (DITROPAN) 5 mg tablet TAKE ONE TABLET BY MOUTH ONCE DAILY 4/14/17  Yes Rosalie Suarez MD   atorvastatin (LIPITOR) 10 mg tablet Take 1 Tab by mouth daily. 4/13/17  Yes Benjamin Cota MD   alendronate (FOSAMAX) 70 mg tablet TAKE ONE TABLET BY MOUTH EVERY FRANCOISE 3/27/17  Yes Rosalie Suarez MD   losartan (COZAAR) 25 mg tablet TAKE ONE TABLET BY MOUTH ONCE DAILY 2/19/17  Yes Rosalie Suarez MD   furosemide (LASIX) 40 mg tablet One po daily 2/1/17  Yes Rosalie Suarez MD   potassium chloride (K-DUR, KLOR-CON) 10 mEq tablet Take 1 Tab by mouth daily.  11/29/16  Yes Rosalie Suarez MD   ergocalciferol (ERGOCALCIFEROL) 50,000 unit capsule Take 1 Cap by mouth every seven (7) days. 10/26/16  Yes Annabelle Gottlieb MD   pantoprazole (PROTONIX) 40 mg tablet Take 1 Tab by mouth daily. 10/26/16  Yes Annabelle Gottlieb MD   DULoxetine (CYMBALTA) 60 mg capsule Take 1 Cap by mouth daily. 10/26/16  Yes Annabelle Gottlieb MD   metFORMIN (GLUCOPHAGE) 1,000 mg tablet Take 1 Tab by mouth two (2) times daily (with meals). 10/26/16  Yes Annabelle Gottlieb MD   acetaminophen-codeine (TYLENOL-CODEINE #3) 300-30 mg per tablet Take 1 Tab by mouth every six (6) hours as needed for Pain. Max Daily Amount: 4 Tabs. 1/25/17   Annabelle Gottlieb MD   albuterol (PROVENTIL HFA, VENTOLIN HFA, PROAIR HFA) 90 mcg/actuation inhaler Take 2 Puffs by inhalation every six (6) hours as needed for Wheezing.  4/19/16   Annabelle Gottlieb MD         Review of Systems:  Constitutional: positive for fevers and chills, negative for weight loss  Ears, nose, mouth, throat, and face: negative for sore throat  Respiratory: negative for wheezing  Cardiovascular: negative for chest pain  Gastrointestinal: negative for dysphagia  Genitourinary:negative for frequency, dysuria, urinary incontinence and hematuria  Hematologic/lymphatic: negative for easy bruising  Musculoskeletal:negative for stiff joints  Neurological: negative for seizures  Endocrine: negative for temperature intolerance        Objective:     Data Review (Labs):    Recent Labs      10/20/17   0350  10/19/17   0345   WBC  5.8  11.3   HGB  10.1*  12.0   MCV  92.9  90.9   PLT  143  157   NA  142  140   K  3.3*  3.6   CREA  0.64  0.79   BUN  8  11   ALB  2.8*  3.1*   SGOT  14*  12*   AP  67  76     UA:  Color DARK YELLOW     Final   Appearance CLOUDY     Final   Specific gravity 1.025  1.005 - 1.030   Final   pH (UA) 6.0  5.0 - 8.0   Final   Protein TRACE (A) NEG mg/dL Final   Glucose NEGATIVE   NEG mg/dL Final   Ketone 15 (A) NEG mg/dL Final   Bilirubin NEGATIVE   NEG   Final   Blood MODERATE (A) NEG   Final   Urobilinogen 1.0 0.2 - 1.0 EU/dL Final   Nitrites POSITIVE (A) NEG   Final   Leukocyte Esterase LARGE (A) NEG   Final     WBC TOO NUMEROUS TO COUNT  0 - 4 /hpf Final   RBC 4 to 10  0 - 5 /hpf Final   Epithelial cells 4+  0 - 5 /lpf Final   Bacteria 4+ (A) NEG /hpf Final     Urine Cx with 100k GNR    Patient Vitals for the past 8 hrs:   BP Temp Pulse Resp SpO2   10/20/17 1423 142/89 98 °F (36.7 °C) 76 20 95 %   10/20/17 1000 146/84 97.7 °F (36.5 °C) 64 20 98 %     Temp (24hrs), Av.1 °F (36.7 °C), Min:97.7 °F (36.5 °C), Max:98.3 °F (36.8 °C)      Intake and Output:   10/18 1901 - 10/20 0700  In: 9149 [I.V.:1555]  Out: 450 [Urine:450]    Physical Exam:            General:    alert, cooperative, no distress, appears stated age                     Skin:  Normal.   Lymph nodes:  Cervical, supraclavicular, and axillary nodes normal.             Abdomen[de-identified]  soft, non-tender.  Bowel sounds normal. No masses,  no organomegaly, No CVAT             Genitalia:  defer exam          Extremities:  negative

## 2017-10-20 NOTE — ROUTINE PROCESS
Bedside and Verbal shift change report given to Donna Alanis (oncoming nurse) by Liz Worthy RN   (offgoing nurse). Report included the following information SBAR, Kardex, Intake/Output and MAR.

## 2017-10-20 NOTE — PROGRESS NOTES
[facility], [place], operated by [facility]  Hospitalist Division        Inpatient Daily Progress Note    Daily progress Note    Patient: Indiana Yanez MRN: [id number]  CSN: [id number]    YOB: 1950  Age: 79 y.o. Sex: female    DOA: 10/19/2017 LOS:  LOS: 1 day                    Chief Complaint:  Fatigue, weakness       Subjective:      Sitting edge of bed. Denies fever, chills, fatigue. In NAD. Objective:      Visit Vitals    /84 (BP 1 Location: Right arm, BP Patient Position: Sitting)    Pulse 64    Temp 97.7 °F (36.5 °C)    Resp 20    Ht 5' (1.524 m)    Wt 136.4 kg (300 lb 12.8 oz)    SpO2 98%    BMI 58.75 kg/m2           Physical Exam:  General appearance: alert, cooperative, no distress, appears stated age  Lungs: clear to auscultation throughout, no wheezes   Heart: regular rate and rhythm, S1, S2 normal, no murmur, click, rub or gallop  Abdomen: soft, non tender, non distended.  Normoactive bowel sounds  Extremities: BLE lymphedema   Skin: Skin color, texture, turgor normal. No rashes or lesions  Neurologic: moves all 4 extremities   PSY: Mood and affect normal, appropriately behaved      Intake and Output:  Current Shift:  10/20 0701 - 10/20 1900  In: -   Out: 250 [Urine:250]  Last three shifts:  10/18 1901 - 10/20 0700  In: 5830 [I.V.:1555]  Out: 450 [Urine:450]    Recent Results (from the past 24 hour(s))   INFLUENZA A & B AG (RAPID TEST)    Collection Time: 10/19/17  4:52 PM   Result Value Ref Range    Influenza A Antigen NEGATIVE  NEG      Influenza B Antigen NEGATIVE  NEG     GLUCOSE, POC    Collection Time: 10/19/17  5:34 PM   Result Value Ref Range    Glucose (POC) 144 (H) 70 - 266 mg/dL   METABOLIC PANEL, COMPREHENSIVE    Collection Time: 10/20/17  3:50 AM   Result Value Ref Range    Sodium 142 136 - 145 mmol/L    Potassium 3.3 (L) 3.5 - 5.5 mmol/L    Chloride 110 (H) 100 - 108 mmol/L    CO2 23 21 - 32 mmol/L    Anion gap 9 3.0 - 18 mmol/L    Glucose 129 (H) 74 - 99 mg/dL    BUN 8 7.0 - 18 MG/DL    Creatinine 0.64 0.6 - 1.3 MG/DL    BUN/Creatinine ratio 13 12 - 20      GFR est AA >60 >60 ml/min/1.73m2    GFR est non-AA >60 >60 ml/min/1.73m2    Calcium 8.0 (L) 8.5 - 10.1 MG/DL    Bilirubin, total 1.2 (H) 0.2 - 1.0 MG/DL    ALT (SGPT) 15 13 - 56 U/L    AST (SGOT) 14 (L) 15 - 37 U/L    Alk.  phosphatase 67 45 - 117 U/L    Protein, total 5.6 (L) 6.4 - 8.2 g/dL    Albumin 2.8 (L) 3.4 - 5.0 g/dL    Globulin 2.8 2.0 - 4.0 g/dL    A-G Ratio 1.0 0.8 - 1.7     LIPID PANEL    Collection Time: 10/20/17  3:50 AM   Result Value Ref Range    LIPID PROFILE          Cholesterol, total 132 <200 MG/DL    Triglyceride 93 <150 MG/DL    HDL Cholesterol 72 (H) 40 - 60 MG/DL    LDL, calculated 41.4 0 - 100 MG/DL    VLDL, calculated 18.6 MG/DL    CHOL/HDL Ratio 1.8 0 - 5.0     CBC W/O DIFF    Collection Time: 10/20/17  3:50 AM   Result Value Ref Range    WBC 5.8 4.6 - 13.2 K/uL    RBC 3.36 (L) 4.20 - 5.30 M/uL    HGB 10.1 (L) 12.0 - 16.0 g/dL    HCT 31.2 (L) 35.0 - 45.0 %    MCV 92.9 74.0 - 97.0 FL    MCH 30.1 24.0 - 34.0 PG    MCHC 32.4 31.0 - 37.0 g/dL    RDW 14.0 11.6 - 14.5 %    PLATELET 840 644 - 240 K/uL    MPV 10.5 9.2 - 11.8 FL   GLUCOSE, POC    Collection Time: 10/20/17  6:43 AM   Result Value Ref Range    Glucose (POC) 115 (H) 70 - 110 mg/dL   GLUCOSE, POC    Collection Time: 10/20/17  8:49 AM   Result Value Ref Range    Glucose (POC) 98 70 - 110 mg/dL   GLUCOSE, POC    Collection Time: 10/20/17 12:26 PM   Result Value Ref Range    Glucose (POC) 91 70 - 110 mg/dL           Lab Results   Component Value Date/Time    Glucose 129 10/20/2017 03:50 AM    Glucose 152 10/19/2017 03:45 AM    Glucose CANCELED 08/07/2017 09:10 AM    Glucose 75 01/25/2017 09:59 AM    Glucose 78 07/26/2016 08:45 AM        Assessment/Plan:     Patient Active Problem List   Diagnosis Code    Pulmonary HTN I27.20    Snoring R06.83    Morbid obesity (HCC) E66.01    Stress incontinence N39.3    Depression F32.9    Impaired hearing H91.90    Sjogrens syndrome (HCC) M35.00    Leg swelling M79.89    Cataract H26.9    Urinary incontinence R32    Epulis K06.8    HTN (hypertension), benign I10    Morbid obesity due to excess calories (HCC) E66.01    Advance care planning Z71.89    Gastroesophageal reflux disease without esophagitis K21.9    Osteoporosis M81.0    Memory impairment R41.3    UTI (urinary tract infection) N39.0       A/P:  UTI: Rocephin. CT abd noted for 2 mm calcification in the right lower pelvis near the UVJ- Urology consulted. Urine culture with gram negative rods- sensitivities pending- continue Rocephin   HTN: stable.  Continue losartan  Prediabetes: SSI, Diabetic Diet    Hypercholesterolemia: atorvastatin   Stress incontinence: oxybutynin   BLE lymphedema: keep BLE elevated   DVT prophylaxis: Heparin   Morbid obesity- BMI 58.6  PT/OT         HALIE Palencia 83  Pager:  [phone]  Office:  [phone]

## 2017-10-21 VITALS
WEIGHT: 293 LBS | RESPIRATION RATE: 16 BRPM | HEART RATE: 62 BPM | OXYGEN SATURATION: 96 % | BODY MASS INDEX: 57.52 KG/M2 | TEMPERATURE: 98 F | HEIGHT: 60 IN | DIASTOLIC BLOOD PRESSURE: 82 MMHG | SYSTOLIC BLOOD PRESSURE: 130 MMHG

## 2017-10-21 LAB
BACTERIA SPEC CULT: ABNORMAL
BACTERIA SPEC CULT: ABNORMAL
GLUCOSE BLD STRIP.AUTO-MCNC: 93 MG/DL (ref 70–110)
GRAM STN SPEC: ABNORMAL
GRAM STN SPEC: ABNORMAL
SERVICE CMNT-IMP: ABNORMAL
SERVICE CMNT-IMP: ABNORMAL

## 2017-10-21 PROCEDURE — 82962 GLUCOSE BLOOD TEST: CPT

## 2017-10-21 PROCEDURE — 74011000258 HC RX REV CODE- 258: Performed by: EMERGENCY MEDICINE

## 2017-10-21 PROCEDURE — 74011250636 HC RX REV CODE- 250/636: Performed by: HOSPITALIST

## 2017-10-21 PROCEDURE — 77030020263 HC SOL INJ SOD CL0.9% LFCR 1000ML

## 2017-10-21 PROCEDURE — 74011250636 HC RX REV CODE- 250/636: Performed by: EMERGENCY MEDICINE

## 2017-10-21 PROCEDURE — 74011250637 HC RX REV CODE- 250/637: Performed by: INTERNAL MEDICINE

## 2017-10-21 PROCEDURE — 74011250637 HC RX REV CODE- 250/637: Performed by: HOSPITALIST

## 2017-10-21 RX ORDER — LEVOFLOXACIN 750 MG/1
750 TABLET ORAL DAILY
Qty: 7 TAB | Refills: 0 | Status: SHIPPED | OUTPATIENT
Start: 2017-10-21 | End: 2018-02-21

## 2017-10-21 RX ORDER — POTASSIUM CHLORIDE 1.5 G/1.77G
40 POWDER, FOR SOLUTION ORAL
Status: COMPLETED | OUTPATIENT
Start: 2017-10-21 | End: 2017-10-21

## 2017-10-21 RX ADMIN — HEPARIN SODIUM 5000 UNITS: 5000 INJECTION, SOLUTION INTRAVENOUS; SUBCUTANEOUS at 10:58

## 2017-10-21 RX ADMIN — HEPARIN SODIUM 5000 UNITS: 5000 INJECTION, SOLUTION INTRAVENOUS; SUBCUTANEOUS at 00:42

## 2017-10-21 RX ADMIN — POTASSIUM CHLORIDE 10 MEQ: 750 TABLET, FILM COATED, EXTENDED RELEASE ORAL at 10:58

## 2017-10-21 RX ADMIN — DULOXETINE HYDROCHLORIDE 60 MG: 60 CAPSULE, DELAYED RELEASE ORAL at 10:58

## 2017-10-21 RX ADMIN — OXYBUTYNIN CHLORIDE 5 MG: 5 TABLET ORAL at 10:58

## 2017-10-21 RX ADMIN — POTASSIUM CHLORIDE 40 MEQ: 1.5 POWDER, FOR SOLUTION ORAL at 10:57

## 2017-10-21 RX ADMIN — LOSARTAN POTASSIUM 25 MG: 50 TABLET ORAL at 10:58

## 2017-10-21 RX ADMIN — ATORVASTATIN CALCIUM 10 MG: 10 TABLET, FILM COATED ORAL at 10:58

## 2017-10-21 RX ADMIN — SODIUM CHLORIDE 75 ML/HR: 900 INJECTION, SOLUTION INTRAVENOUS at 06:00

## 2017-10-21 RX ADMIN — PANTOPRAZOLE SODIUM 40 MG: 40 TABLET, DELAYED RELEASE ORAL at 10:58

## 2017-10-21 RX ADMIN — CEFTRIAXONE 1 G: 1 INJECTION, POWDER, FOR SOLUTION INTRAMUSCULAR; INTRAVENOUS at 04:00

## 2017-10-21 NOTE — PROGRESS NOTES
Progress Note    Patient: Amber Castillo MRN: [id number]  SSN: [id number]    YOB: 1950  Age: 79 y.o.   Sex: female      Admit Date: 10/19/2017    LOS: 2 days     Subjective:     No flank pain, no fever    Objective:     Vitals:    10/20/17 2229 10/21/17 0050 10/21/17 0213 10/21/17 0650   BP: 153/89  131/68 130/82   Pulse: 77  64 62   Resp: 18  16 16   Temp: 98.3 °F (36.8 °C)  98.5 °F (36.9 °C) 98 °F (36.7 °C)   SpO2: 96%  95% 96%   Weight:  308 lb (139.7 kg)     Height:            Intake and Output:  Current Shift: 10/21 0701 - 10/21 1900  In: 240 [P.O.:240]  Out: 400 [Urine:400]  Last three shifts: 10/19 1901 - 10/21 0700  In: 1661.3 [I.V.:1661.3]  Out: 1050 [Urine:1050]    Physical Exam:   GENERAL: alert, cooperative, no distress, appears stated age  LUNG: unlabored  HEART: regular rate and rhythm  ABDOMEN: Soft, Non tender  EXTREMITIES:  extremities normal, atraumatic, no cyanosis or edema  SKIN: Normal.  PSYCHIATRIC: non focal  INCISION: clean, dry, intact    Lab/Data Review:    Lab Results   Component Value Date/Time    WBC 5.8 10/20/2017 03:50 AM    HGB 10.1 10/20/2017 03:50 AM    HCT 31.2 10/20/2017 03:50 AM    PLATELET 074 00/00/7076 03:50 AM    MCV 92.9 10/20/2017 03:50 AM       Lab Results   Component Value Date/Time    Sodium 142 10/20/2017 03:50 AM    Potassium 3.3 10/20/2017 03:50 AM    Chloride 110 10/20/2017 03:50 AM    CO2 23 10/20/2017 03:50 AM    Anion gap 9 10/20/2017 03:50 AM    Glucose 129 10/20/2017 03:50 AM    BUN 8 10/20/2017 03:50 AM    Creatinine 0.64 10/20/2017 03:50 AM    BUN/Creatinine ratio 13 10/20/2017 03:50 AM    GFR est AA >60 10/20/2017 03:50 AM    GFR est non-AA >60 10/20/2017 03:50 AM    Calcium 8.0 10/20/2017 03:50 AM         67yoF with UTI and stable pelvic calcification vs stone with no evidence of obstruction    Assessment:     Active Problems:    UTI (urinary tract infection) (10/19/2017)        Plan:     Treat UTI  Care per primary  No  intervention at this point    Signed By: Wendie Albarado MD     October 21, 2017

## 2017-10-21 NOTE — DISCHARGE SUMMARY
Discharge Summary    Patient: Ye Neely               Sex: female          DOA: 10/19/2017         YOB: 1950      Age:  79 y.o.        LOS:  LOS: 2 days                Admit Date: 10/19/2017    Discharge Date: 10/21/2017    Admission Diagnoses: UTI (urinary tract infection)  UTI (urinary tract infection)    Discharge Diagnoses:  1-Sepsis due to UTI  2-UTI    3-Hypokalemia  4-Morbid obesity    Problem List as of 10/21/2017  Date Reviewed: 8/31/2017          Codes Class Noted - Resolved    UTI (urinary tract infection) ICD-10-CM: N39.0  ICD-9-CM: 599.0  10/19/2017 - Present        Memory impairment ICD-10-CM: R41.3  ICD-9-CM: 780.93  8/7/2017 - Present        Osteoporosis ICD-10-CM: M81.0  ICD-9-CM: 733.00  5/1/2017 - Present        Gastroesophageal reflux disease without esophagitis ICD-10-CM: K21.9  ICD-9-CM: 530.81  10/26/2016 - Present        Advance care planning ICD-10-CM: Z71.89  ICD-9-CM: V65.49  10/3/2016 - Present    Overview Signed 10/3/2016  7:13 PM by Fran Jeronimo MD     Advance Care Planning (ACP) Initial Conversation     Date of ACP Conversation: 07/26/2016  Persons included in Conversation: patient and family  Length of ACP Conversation in minutes: 20 minutes     Authorized Decision Maker (if patient is incapable of making informed decisions):    This person is:   Healthcare Agent/Medical Power of  under Advance Directive      General ACP for ALL Patients with Decision Making Capacity:   Importance of advance care planning, including choosing a healthcare agent to communicate patient's healthcare decisions if patient lost the ability to make decisions, such as after a sudden illness or accident     Chronic or serious illness:  Understanding of medical condition      Conversation Outcomes / Follow-Up Plan:   Recommended completion of Advance Directive form after review of materials and conversation with prospective healthcare agent             Electronically signed by Chris Rodriguez MD at 07/26/16 0759                  HTN (hypertension), benign ICD-10-CM: I10  ICD-9-CM: 401.1  4/19/2016 - Present        Morbid obesity due to excess calories ([facility] ICD-10-CM: E66.01  ICD-9-CM: 278.01  4/19/2016 - Present        Epulis ICD-10-CM: K06.8  ICD-9-CM: 523.8  1/19/2016 - Present        Urinary incontinence ICD-10-CM: R32  ICD-9-CM: 788.30  11/24/2015 - Present        Cataract ICD-10-CM: H26.9  ICD-9-CM: 366.9  11/19/2015 - Present        Sjogrens syndrome ([facility] ICD-10-CM: M35.00  ICD-9-CM: 710.2  3/18/2015 - Present        Leg swelling ICD-10-CM: M79.89  ICD-9-CM: 729.81  3/18/2015 - Present        Pulmonary HTN ICD-10-CM: I27.20  ICD-9-CM: 416.8  11/26/2014 - Present        Snoring ICD-10-CM: R06.83  ICD-9-CM: 786.09  11/26/2014 - Present        Morbid obesity ([facility] ICD-10-CM: E66.01  ICD-9-CM: 278.01  11/26/2014 - Present        Stress incontinence ICD-10-CM: N39.3  ICD-9-CM: Suraj Andino  11/26/2014 - Present        Depression ICD-10-CM: F32.9  ICD-9-CM: 024  11/26/2014 - Present        Impaired hearing ICD-10-CM: H91.90  ICD-9-CM: 389.9  11/26/2014 - Present              Discharge Medications:     Current Discharge Medication List      START taking these medications    Details   levoFLOXacin (LEVAQUIN) 750 mg tablet Take 1 Tab by mouth daily. Qty: 7 Tab, Refills: 0         CONTINUE these medications which have NOT CHANGED    Details   !! furosemide (LASIX) 20 mg tablet TAKE ONE TABLET BY MOUTH ONCE DAILY  Qty: 90 Tab, Refills: 1    Associated Diagnoses: Leg swelling      metroNIDAZOLE (NORITATE) 1 % topical cream Apply  to affected area two (2) times daily (with meals). Use a thin layer to affected areas after washing  Qty: 30 g, Refills: 2    Associated Diagnoses: Rosacea      !! metFORMIN (GLUCOPHAGE) 500 mg tablet TAKE ONE TABLET BY MOUTH TWICE DAILY WITH MEALS  Qty: 180 Tab, Refills: 4    Associated Diagnoses: IFG (impaired fasting glucose);  Obesity      !! losartan (COZAAR) 25 mg tablet Take 1 Tab by mouth daily. Qty: 90 Tab, Refills: 1    Associated Diagnoses: Essential hypertension      oxybutynin (DITROPAN) 5 mg tablet TAKE ONE TABLET BY MOUTH ONCE DAILY  Qty: 90 Tab, Refills: 0    Associated Diagnoses: Urge incontinence of urine      atorvastatin (LIPITOR) 10 mg tablet Take 1 Tab by mouth daily. Qty: 90 Tab, Refills: 0    Associated Diagnoses: Hypercholesteremia      alendronate (FOSAMAX) 70 mg tablet TAKE ONE TABLET BY MOUTH EVERY SUNDAY  Qty: 4 Tab, Refills: 0    Associated Diagnoses: Osteoporosis      !! losartan (COZAAR) 25 mg tablet TAKE ONE TABLET BY MOUTH ONCE DAILY  Qty: 90 Tab, Refills: 0    Associated Diagnoses: Essential hypertension      !! furosemide (LASIX) 40 mg tablet One po daily  Qty: 30 Tab, Refills: 3    Associated Diagnoses: Leg swelling      potassium chloride (K-DUR, KLOR-CON) 10 mEq tablet Take 1 Tab by mouth daily. Qty: 90 Tab, Refills: 1    Associated Diagnoses: Leg swelling      ergocalciferol (ERGOCALCIFEROL) 50,000 unit capsule Take 1 Cap by mouth every seven (7) days. Qty: 4 Cap, Refills: 12    Associated Diagnoses: Osteoporosis      pantoprazole (PROTONIX) 40 mg tablet Take 1 Tab by mouth daily. Qty: 90 Tab, Refills: 3    Associated Diagnoses: Gastroesophageal reflux disease without esophagitis      DULoxetine (CYMBALTA) 60 mg capsule Take 1 Cap by mouth daily. Qty: 90 Cap, Refills: 3    Associated Diagnoses: Other depression      !! metFORMIN (GLUCOPHAGE) 1,000 mg tablet Take 1 Tab by mouth two (2) times daily (with meals). Qty: 180 Tab, Refills: 1    Associated Diagnoses: Morbid obesity, unspecified obesity type ([facility]      acetaminophen-codeine (TYLENOL-CODEINE #3) 300-30 mg per tablet Take 1 Tab by mouth every six (6) hours as needed for Pain. Max Daily Amount: 4 Tabs.   Qty: 40 Tab, Refills: 0    Associated Diagnoses: Chronic left shoulder pain      albuterol (PROVENTIL HFA, VENTOLIN HFA, PROAIR HFA) 90 mcg/actuation inhaler Take 2 Puffs by inhalation every six (6) hours as needed for Wheezing. Qty: 1 Inhaler, Refills: 3    Associated Diagnoses: Bronchitis       ! ! - Potential duplicate medications found. Please discuss with provider. STOP taking these medications       cephALEXin (KEFLEX) 500 mg capsule Comments:   Reason for Stopping: Follow-up: pcp    Discharge Condition:good    Activity:as tolerated    Diet:heart healthy    Labs:  Labs: Results:       Chemistry Recent Labs      10/20/17   0350  10/19/17   0345   GLU  129*  152*   NA  142  140   K  3.3*  3.6   CL  110*  107   CO2  23  25   BUN  8  11   CREA  0.64  0.79   CA  8.0*  8.9   AGAP  9  8   BUCR  13  14   AP  67  76   TP  5.6*  6.8   ALB  2.8*  3.1*   GLOB  2.8  3.7   AGRAT  1.0  0.8      CBC w/Diff Recent Labs      10/20/17   0350  10/19/17   0345   WBC  5.8  11.3   RBC  3.36*  3.95*   HGB  10.1*  12.0   HCT  31.2*  35.9   PLT  143  157   GRANS   --   87*   LYMPH   --   4*   EOS   --   0      Cardiac Enzymes Recent Labs      10/19/17   0345   CPK  43      Coagulation No results for input(s): PTP, INR, APTT in the last 72 hours. No lab exists for component: INREXT    Lipid Panel Lab Results   Component Value Date/Time    Cholesterol, total 132 10/20/2017 03:50 AM    HDL Cholesterol 72 10/20/2017 03:50 AM    LDL, calculated 41.4 10/20/2017 03:50 AM    VLDL, calculated 18.6 10/20/2017 03:50 AM    Triglyceride 93 10/20/2017 03:50 AM    CHOL/HDL Ratio 1.8 10/20/2017 03:50 AM      BNP No results for input(s): BNPP in the last 72 hours. Liver Enzymes Recent Labs      10/20/17   0350   TP  5.6*   ALB  2.8*   AP  67   SGOT  14*      Thyroid Studies Lab Results   Component Value Date/Time    TSH 2.130 08/07/2017 09:10 AM          Imaging:  CT abdm/pelvis:  1. No obstructive uropathy. There is a 2 mm calcification in the right lower  pelvis near the UVJ.  It is unclear if this is within the distal right ureter or  may represent a phlebolith due to lack of hydroureter.     2. Unremarkable gallbladder and appendix.     Consults:   urology    Treatment Team: Treatment Team: Attending Provider: Aria Agrawal MD; Utilization Review: Deya Marcos; Nurse Practitioner: Alexandra Solis NP; Consulting Provider: Fermin Ang MD; Care Manager: Ese Parson, DEANA; Primary Nurse: Lizzeth Mercedes; Physical Therapy Assistant: Judie Eugene PTA    Significant Diagnostic Studies:    Urine cx:  Culture result:  (A)    Final      >100,000 COLONIES/mL ENTEROBACTER AEROGENES       Hospital Course:  David Cummings is a 79 y.o. female who has hx of lymphedema , knee replacement, and bone graphs, HTN, Morbid obesity  who presented with generalized body ache and fever x 2 days. She also reported right sided flank pain. In ER she was noted to be febrile with leukocytosis. UA c/w UTI. CT abdm/pelvis c/w suspected stone near UVJ. Pt was started on ceftriaxone and cultures were ordered. Blood cx negative. Ucx c/w enterobacter aerogenes sensitive to ceftriaxone,levaquin. Urology was consulted for the possible ureter stone and determined that there was none. Patient has improved since her admission and she clinically stable for discharge today. She will receive Kcl 40 meq before since her potassium was 3.3 on 10/20 and her pcp can repeat the lab on the next visit. Patient routinely take potassium. Weight loss education has been provided,eventually join weight therapy program on outside. Time for discharge:35 minutes.       Aria Agrawal MD  October 21, 2017

## 2017-10-21 NOTE — PROGRESS NOTES
Problem: Falls - Risk of  Goal: *Absence of Falls  Document Coty Fall Risk and appropriate interventions in the flowsheet.    Outcome: Progressing Towards Goal  Fall Risk Interventions:  Mobility Interventions: Patient to call before getting OOB         Medication Interventions: Patient to call before getting OOB    Elimination Interventions: Patient to call for help with toileting needs

## 2017-10-21 NOTE — DISCHARGE INSTRUCTIONS
DISCHARGE SUMMARY from Nurse    The following personal items are in your possession at time of discharge:    Dental Appliances: None  Visual Aid: Glasses, With patient     Home Medications: None  Jewelry: None  Clothing: None  Other Valuables: Cane             PATIENT INSTRUCTIONS:    After general anesthesia or intravenous sedation, for 24 hours or while taking prescription Narcotics:  · Limit your activities  · Do not drive and operate hazardous machinery  · Do not make important personal or business decisions  · Do  not drink alcoholic beverages  · If you have not urinated within 8 hours after discharge, please contact your surgeon on call. Report the following to your surgeon:  · Excessive pain, swelling, redness or odor of or around the surgical area  · Temperature over 100.5  · Nausea and vomiting lasting longer than 4 hours or if unable to take medications  · Any signs of decreased circulation or nerve impairment to extremity: change in color, persistent  numbness, tingling, coldness or increase pain  · Any questions        What to do at Home:  Recommended activity: Activity as tolerated as doctor has instructed. If you experience any of the following symptoms severe pain, please follow up with family doctor. *  Please give a list of your current medications to your Primary Care Provider. *  Please update this list whenever your medications are discontinued, doses are      changed, or new medications (including over-the-counter products) are added. *  Please carry medication information at all times in case of emergency situations. These are general instructions for a healthy lifestyle:    No smoking/ No tobacco products/ Avoid exposure to second hand smoke    Surgeon General's Warning:  Quitting smoking now greatly reduces serious risk to your health.     Obesity, smoking, and sedentary lifestyle greatly increases your risk for illness    A healthy diet, regular physical exercise & weight monitoring are important for maintaining a healthy lifestyle    You may be retaining fluid if you have a history of heart failure or if you experience any of the following symptoms:  Weight gain of 3 pounds or more overnight or 5 pounds in a week, increased swelling in our hands or feet or shortness of breath while lying flat in bed. Please call your doctor as soon as you notice any of these symptoms; do not wait until your next office visit. Recognize signs and symptoms of STROKE:    F-face looks uneven    A-arms unable to move or move unevenly    S-speech slurred or non-existent    T-time-call 911 as soon as signs and symptoms begin-DO NOT go       Back to bed or wait to see if you get better-TIME IS BRAIN. Warning Signs of HEART ATTACK     Call 911 if you have these symptoms:   Chest discomfort. Most heart attacks involve discomfort in the center of the chest that lasts more than a few minutes, or that goes away and comes back. It can feel like uncomfortable pressure, squeezing, fullness, or pain.  Discomfort in other areas of the upper body. Symptoms can include pain or discomfort in one or both arms, the back, neck, jaw, or stomach.  Shortness of breath with or without chest discomfort.  Other signs may include breaking out in a cold sweat, nausea, or lightheadedness. Don't wait more than five minutes to call 911 - MINUTES MATTER! Fast action can save your life. Calling 911 is almost always the fastest way to get lifesaving treatment. Emergency Medical Services staff can begin treatment when they arrive -- up to an hour sooner than if someone gets to the hospital by car. The discharge information has been reviewed with the patient. The patient verbalized understanding. Discharge medications reviewed with the patient and appropriate educational materials and side effects teaching were provided. [employer] Activation    Thank you for requesting access to [employer].  Please follow the instructions below to securely access and download your online medical record. [employer] allows you to send messages to your doctor, view your test results, renew your prescriptions, schedule appointments, and more. How Do I Sign Up? 1. In your internet browser, go to [email or web address]  2. Click on the First Time User? Click Here link in the Sign In box. You will be redirect to the New Member Sign Up page. 3. Enter your [employer] Access Code exactly as it appears below. You will not need to use this code after youve completed the sign-up process. If you do not sign up before the expiration date, you must request a new code. [employer] Access Code: Activation code not generated  Current [employer] Status: Active (This is the date your [employer] access code will )    4. Enter the last four digits of your Social Security Number (xxxx) and Date of Birth (mm/dd/yyyy) as indicated and click Submit. You will be taken to the next sign-up page. 5. Create a [employer] ID. This will be your [employer] login ID and cannot be changed, so think of one that is secure and easy to remember. 6. Create a [employer] password. You can change your password at any time. 7. Enter your Password Reset Question and Answer. This can be used at a later time if you forget your password. 8. Enter your e-mail address. You will receive e-mail notification when new information is available in [address] 9. Click Sign Up. You can now view and download portions of your medical record. 10. Click the Download Summary menu link to download a portable copy of your medical information. Additional Information    If you have questions, please visit the Frequently Asked Questions section of the [employer] website at [email or web address] Remember, [employer] is NOT to be used for urgent needs. For medical emergencies, dial 911.     Patient armband removed and shredded

## 2017-10-21 NOTE — PROGRESS NOTES
New IV inserted on the left inner forearm gauge 22 attempt x 2 successfully. IVF NS @ 75  Resumed infusing well. Pt resting in bed watching tv, call light within reach.

## 2017-10-21 NOTE — ROUTINE PROCESS
Bedside and Verbal shift change report given to DEANA Jolly (oncoming nurse) by Saira Stephens RN (offgoing nurse). Report included the following information SBAR, Kardex, MAR and Cardiac Rhythm NSR BBB. Patient resting comfortably.

## 2017-10-21 NOTE — PROGRESS NOTES
Problem: Falls - Risk of  Goal: *Absence of Falls  Document Coty Fall Risk and appropriate interventions in the flowsheet.    Outcome: Progressing Towards Goal  Fall Risk Interventions:  Mobility Interventions: Patient to call before getting OOB         Medication Interventions: Patient to call before getting OOB, Teach patient to arise slowly    Elimination Interventions: Call light in reach

## 2017-10-21 NOTE — PROGRESS NOTES
Problem: Mobility Impaired (Adult and Pediatric)  Goal: *Acute Goals and Plan of Care (Insert Text)  Physical Therapy Goals  Initiated 10/20/2017 and to be accomplished within 7 day(s)  1. Patient will move from supine to sit and sit to supine , scoot up and down and roll side to side in bed with modified independence. 2.  Patient will transfer from bed to chair and chair to bed with modified independence using the least restrictive device. 3.  Patient will perform sit to stand with modified independence. 4.  Patient will ambulate with modified independence for 150 feet with the least restrictive device. 5.  Patient will ascend/descend 5 stairs with  handrail(s) with supervision/set-up. Outcome: Progressing Towards Goal  physical Therapy EVALUATION    Patient: Trae Navarror (87 y.o. female)  Date: 10/20/2017  Primary Diagnosis: UTI (urinary tract infection)  UTI (urinary tract infection)        Precautions:   Fall, Skin    PROBLEM LIST:  Patient presents with the following problems:   Bed Mobility, Transfers, Gait, Strength, Balance, Stairs, Precautions and endurance   ASSESSMENT :   Patient requires between supervision/set-up and minimal assistance/contact guard assist for bed mobility, transfers and ambulation. Patient moving slowly with rolling walker needing cues to stay upright and  Breathing rest upright . Patient already has rolling walker/rollator at home. Patient reports spending most of time sleeping in recliner. Left sitting at edge of bed requested patient not use powder and nyasporin cream  on open wound on back of left leg and  Just use light cloth to prevent skin to skin contact. Patient also educated on upright posture and not to use  walker with elbows and forearms on arm for support while ambulating needs reinforcement. Patient will benefit from skilled intervention to address the above impairments.   Patients rehabilitation potential is considered to be Fair  Factors which may influence rehabilitation potential include:   []         None noted  []         Mental ability/status  [x]         Medical condition  []         Home/family situation and support systems  []         Safety awareness  []         Pain tolerance/management  []         Other:      PLAN :  Recommendations and Planned Interventions:  [x]           Bed Mobility Training             [x]    Neuromuscular Re-Education  [x]           Transfer Training                   []    Orthotic/Prosthetic Training  [x]           Gait Training                          []    Modalities  [x]           Therapeutic Exercises          []    Edema Management/Control  [x]           Therapeutic Activities            [x]    Patient and Family Training/Education  []           Other (comment):    Frequency/Duration: Patient will be followed by physical therapy 3-5 times a week to address goals. Discharge Recommendations: Home Health then pulmonary rehab   Further Equipment Recommendations for Discharge: N/A     SUBJECTIVE:   Patient stated .    OBJECTIVE DATA SUMMARY:     Past Medical History:   Diagnosis Date    Degenerative disk disease     Depression     Depression     Diffuse idiopathic skeletal hyperostosis     Fracture of leg     Gallstone     Lymphedema of leg 1975    bilateral lower legs    Morbid obesity ([address]     Pulmonary arterial hypertension ([address]     MANPREET (stress urinary incontinence, female)      Past Surgical History:   Procedure Laterality Date    HX HYSTERECTOMY      HX KNEE REPLACEMENT      HX ORTHOPAEDIC      right shoulder and right knee replaced x 2, left knee replaced as well     Barriers to Learning/Limitations: yes;  physical  Compensate with: visual, verbal, tactile, kinesthetic cues/model    G CODES:Mobility  Current  CL= 60-79%   Goal  CJ= 20-39%.   The severity rating is based on the Other [employer] Balance Scale2/5   [facility] Standing Balance Scale2/5  0: Pt performs 25% or less of standing activity (Max assist) CN, 100% impaired. 1: Pt supports self with upper extremities but requires therapist assistance. Pt performs 25-50% of effort (Mod assist) CM, 80% to <100% impaired. 1+: Pt supports self with upper extremities but requires therapist assistance. Pt performs >50% effort. (Min assist). CL, 60% to <80% impaired. 2: Pt supports self independently with both upper extremities (walker, crutches, parallel bars). CL, 60% to <80% impaired. 2+: Pt support self independently with 1 upper extremity (cane, crutch, 1 parallel bar). CK, 40% to <60% impaired. 3: Pt stands without upper extremity support for up to 30 seconds. CK, 40% to <60% impaired. 3+: Pt stands without upper extremity support for 30 seconds or greater. CJ, 20% to <40% impaired. 4: Pt independently moves and returns center of gravity 1-2 inches in one plane. CJ, 20% to <40% impaired. 4+: Pt independently moves and returns center of gravity 1-2 inches in multiple planes. CI, 1% to <20% impaired. 5: Pt independently moves and returns center of gravity in all planes greater than 2 inches. CH, 0% impaired.       Eval Complexity: History: MEDIUM  Complexity : 1-2 comorbidities / personal factors will impact the outcome/ POC Exam:MEDIUM Complexity : 3 Standardized tests and measures addressing body structure, function, activity limitation and / or participation in recreation  Presentation: MEDIUM Complexity : Evolving with changing characteristics  Clinical Decision Making:Medium Complexity [facility] Standing Balance Scale2/5 Overall Complexity:MEDIUM    Prior Level of Function/Home Situation: using rollator  Or straight cane for mobility   Home Situation  Home Environment: Private residence  # Steps to Enter: 5  Rails to Enter: Yes  Hand Rails : Bilateral  One/Two Story Residence: One story  Living Alone: No  Support Systems: Family member(s)  Patient Expects to be Discharged to[de-identified] Private residence  Current DME Used/Available at Home: [employer], straight, Walker, rollator  Tub or Shower Type: Tub/Shower combination  Critical Behavior:  Neurologic State: Alert  Orientation Level: Oriented X4  Cognition: Follows commands  Safety/Judgement: Fall prevention  Psychosocial  Patient Behaviors: Calm; Cooperative  Family  Behaviors: Calm; Appropriate for situation; Cooperative  Purposeful Interaction: Yes  Pt Identified Daily Priority: Clinical issues (comment)  Caritas Process: Nurture loving kindness;Enable cally/hope;Establish trust;Nurture spiritual self;Teaching/learning; Attend basic human needs;Create healing environment  Caring Interventions: Reassure; Therapeutic modalities  Reassure: Therapeutic listening; Informing; Acceptance;Caring rounds;Story tellings; Support family  Therapeutic Modalities: Humor; Intentional therapeutic touch  Skin Condition/Temp: Dry;Warm  Family  Behaviors: Calm; Appropriate for situation; Cooperative  Skin Integrity:  (left calf posterior skin fold  open area )  Skin Integumentary  Skin Color: Appropriate for ethnicity  Skin Condition/Temp: Dry;Warm  Skin Integrity:  (left calf posterior skin fold  open area )  Turgor: Epidermis thin w/ loss of subcut tissue  Hair Growth: Present  Varicosities: Absent     Strength:    Strength: Generally decreased, functional (3/5 both legs and UE )  Tone & Sensation:   Tone: Normal  Range Of Motion:  AROM: Generally decreased, functional  PROM: Generally decreased, functional  Functional Mobility:  Bed Mobility:  Supine to Sit: Stand-by asssistance; Additional time;Assist x1  Sit to Supine:  (left sitting up at eob )  Transfers:  Sit to Stand: Contact guard assistance;Minimum assistance; Additional time;Assist x1  Stand to Sit: Contact guard assistance;Minimum assistance; Additional time;Assist x1  Balance:   Sitting: Impaired  Sitting - Static: Good (unsupported); Fair (occasional)  Sitting - Dynamic: Fair (occasional)  Standing: Impaired  Standing - Static: Fair  Standing - Dynamic : Fair  Ambulation/Gait Training:  Distance (ft): 35 Feet (ft) (x2)  Assistive Device: Walker, rolling  Ambulation - Level of Assistance: Stand-by asssistance;Contact guard assistance; Additional time;Assist x1  Gait Description (WDL): Exceptions to WDL  Gait Abnormalities: Decreased step clearance;Shuffling gait; Step to gait; Path deviations  Base of Support: Center of gravity altered  Speed/Teagan: Delayed  Step Length: Left shortened;Right shortened  Interventions: Safety awareness training;Verbal cues; Visual/Demos  Therapeutic Exercises:   ankle pumps glut sets   Pain:  Pre treatment pain level:0  Post treatment pain level:0  Pain Scale 1: Numeric (0 - 10)  Pain Intensity 1: 0  Activity Tolerance:   Fair minus   Please refer to the flowsheet for vital signs taken during this treatment. After treatment:   []         Patient left in no apparent distress sitting up in chair  [x]         Patient left in no apparent distress in bed sitting at eob  [x]         Call bell left within reach  [x]         Nursing notified  [x]         Caregiver present  []         Bed alarm activated    COMMUNICATION/EDUCATION:   [x]         Fall prevention education was provided and the patient/caregiver indicated understanding. [x]         Patient/family have participated as able in goal setting and plan of care. [x]         Patient/family agree to work toward stated goals and plan of care. []         Patient understands intent and goals of therapy, but is neutral about his/her participation. []         Patient is unable to participate in goal setting and plan of care. Patient educated on the role of physical therapy during the acute stay  and the importance of mobility. VU. Needs reinforcement.       Thank you for this referral.  oRbi [place], PT   Time Calculation: 35 mins

## 2017-10-21 NOTE — PROGRESS NOTES
Bedside and Verbal shift change report given to Hardeep Cooper RN (oncoming nurse) by Preethi Shaffer RN (offgoing nurse). Report included the following information SBAR, Kardex and MAR. Patient AAOX4, patient calm and cooperative. Patient denies pain at this time. Resting comfortably. Bed locked and at lowest position. Call bell within reach.

## 2017-10-21 NOTE — PROGRESS NOTES
Discharge instruction given to patient with teach back method, patient has voiced understanding on how to take medicine and follow up with doctors. Waiting for spouse to pick her up.

## 2017-10-21 NOTE — PROGRESS NOTES
Pt. Care received. Resting comfortably in bed. A/O x 4. No signs/symptoms. Call light within reach and bed in lowest position and locked. Patient stable. [phone] discharge printed and reviewed by Curtis Umana RN. Patient waiting for  to come get her. Patient is waiting for her  to get here. 1215 Pt discharged home accompanied by CNA to front entrance where car awaits. Pt has all discharge instructions and prescriptions. Voiced understanding, no distress noted.

## 2017-10-23 ENCOUNTER — PATIENT OUTREACH (OUTPATIENT)
Dept: FAMILY MEDICINE CLINIC | Age: 67
End: 2017-10-23

## 2017-10-23 ENCOUNTER — OFFICE VISIT (OUTPATIENT)
Dept: FAMILY MEDICINE CLINIC | Age: 67
End: 2017-10-23

## 2017-10-23 ENCOUNTER — HOSPITAL ENCOUNTER (OUTPATIENT)
Dept: LAB | Age: 67
Discharge: HOME | End: 2017-10-23

## 2017-10-23 VITALS
WEIGHT: 293 LBS | SYSTOLIC BLOOD PRESSURE: 149 MMHG | BODY MASS INDEX: 57.52 KG/M2 | HEART RATE: 71 BPM | HEIGHT: 60 IN | DIASTOLIC BLOOD PRESSURE: 87 MMHG | RESPIRATION RATE: 18 BRPM | TEMPERATURE: 97.7 F

## 2017-10-23 DIAGNOSIS — M81.8 OTHER OSTEOPOROSIS WITHOUT CURRENT PATHOLOGICAL FRACTURE: ICD-10-CM

## 2017-10-23 DIAGNOSIS — B35.6 TINEA CRURIS: ICD-10-CM

## 2017-10-23 DIAGNOSIS — R73.01 IFG (IMPAIRED FASTING GLUCOSE): ICD-10-CM

## 2017-10-23 DIAGNOSIS — E66.01 MORBID OBESITY (HCC): ICD-10-CM

## 2017-10-23 DIAGNOSIS — L21.9 SEBORRHEA: ICD-10-CM

## 2017-10-23 DIAGNOSIS — N39.0 URINARY TRACT INFECTION WITHOUT HEMATURIA, SITE UNSPECIFIED: Primary | ICD-10-CM

## 2017-10-23 PROCEDURE — 99001 SPECIMEN HANDLING PT-LAB: CPT | Performed by: INTERNAL MEDICINE

## 2017-10-23 RX ORDER — MICONAZOLE
POWDER (GRAM) MISCELLANEOUS
Qty: 100 G | Refills: 3 | Status: SHIPPED | OUTPATIENT
Start: 2017-10-23 | End: 2019-05-16

## 2017-10-23 RX ORDER — MAG HYDROX/ALUMINUM HYD/SIMETH 200-200-20
SUSPENSION, ORAL (FINAL DOSE FORM) ORAL 2 TIMES DAILY
Qty: 30 G | Refills: 0 | Status: SHIPPED | OUTPATIENT
Start: 2017-10-23 | End: 2019-05-16

## 2017-10-23 RX ORDER — ALENDRONATE SODIUM 70 MG/1
70 TABLET ORAL
Qty: 4 TAB | Refills: 0 | Status: SHIPPED | OUTPATIENT
Start: 2017-10-23 | End: 2019-05-16 | Stop reason: SDUPTHER
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
## 2017-10-23 NOTE — PROGRESS NOTES
Juanjo Mares is a 79 y.o.  female and presents with     Chief Complaint   Patient presents with    Seborrheic Dermatitis   [address] [place] Follow Up    Bladder Infection    Hypertension    Obesity       Pt was admitted to hospital with low back pain , chills, fever and dysuria. Pt was felt to be septic. Pt was treated with IV abx. Pt was sent hoem on po levoflox. Pt feels better now. Pt wants to cut down on some of her meds. Pt has rash on her face  And also has itching in the fold on the left leg where she has lymphedema. Past Medical History:   Diagnosis Date    Degenerative disk disease     Depression     Depression     Diffuse idiopathic skeletal hyperostosis     Fracture of leg     Gallstone     Lymphedema of leg 1975    bilateral lower legs    Morbid obesity ([address]     Pulmonary arterial hypertension (HCC)     MANPREET (stress urinary incontinence, female)      Past Surgical History:   Procedure Laterality Date    HX HYSTERECTOMY      HX KNEE REPLACEMENT      HX ORTHOPAEDIC      right shoulder and right knee replaced x 2, left knee replaced as well     Current Outpatient Prescriptions   Medication Sig    Miconazole powd Apply twice daily on the left leg    hydrocortisone (HYCORT) 1 % ointment Apply  to affected area two (2) times a day. use thin layer    alendronate (FOSAMAX) 70 mg tablet Take 1 Tab by mouth every seven (7) days.  levoFLOXacin (LEVAQUIN) 750 mg tablet Take 1 Tab by mouth daily.  furosemide (LASIX) 20 mg tablet TAKE ONE TABLET BY MOUTH ONCE DAILY    metFORMIN (GLUCOPHAGE) 500 mg tablet TAKE ONE TABLET BY MOUTH TWICE DAILY WITH MEALS    losartan (COZAAR) 25 mg tablet Take 1 Tab by mouth daily.  oxybutynin (DITROPAN) 5 mg tablet TAKE ONE TABLET BY MOUTH ONCE DAILY    atorvastatin (LIPITOR) 10 mg tablet Take 1 Tab by mouth daily.     acetaminophen-codeine (TYLENOL-CODEINE #3) 300-30 mg per tablet Take 1 Tab by mouth every six (6) hours as needed for Pain. Max Daily Amount: 4 Tabs.  potassium chloride (K-DUR, KLOR-CON) 10 mEq tablet Take 1 Tab by mouth daily.  ergocalciferol (ERGOCALCIFEROL) 50,000 unit capsule Take 1 Cap by mouth every seven (7) days.  DULoxetine (CYMBALTA) 60 mg capsule Take 1 Cap by mouth daily.  albuterol (PROVENTIL HFA, VENTOLIN HFA, PROAIR HFA) 90 mcg/actuation inhaler Take 2 Puffs by inhalation every six (6) hours as needed for Wheezing.  metroNIDAZOLE (NORITATE) 1 % topical cream Apply  to affected area two (2) times daily (with meals). Use a thin layer to affected areas after washing    losartan (COZAAR) 25 mg tablet TAKE ONE TABLET BY MOUTH ONCE DAILY    furosemide (LASIX) 40 mg tablet One po daily    pantoprazole (PROTONIX) 40 mg tablet Take 1 Tab by mouth daily.  metFORMIN (GLUCOPHAGE) 1,000 mg tablet Take 1 Tab by mouth two (2) times daily (with meals). No current facility-administered medications for this visit. Health Maintenance   Topic Date Due    GLAUCOMA SCREENING Q2Y  07/09/2017    MEDICARE YEARLY EXAM  08/08/2018    BREAST CANCER SCRN MAMMOGRAM  12/29/2018    COLONOSCOPY  07/25/2023    DTaP/Tdap/Td series (2 - Td) 07/09/2025    Hepatitis C Screening  Completed    OSTEOPOROSIS SCREENING (DEXA)  Completed    ZOSTER VACCINE AGE 60>  Completed    Pneumococcal 65+ Low/Medium Risk  Completed    INFLUENZA AGE 9 TO ADULT  Completed     Immunization History   Administered Date(s) Administered    Influenza High Dose Vaccine PF 10/19/2015, 08/12/2016    Influenza Vaccine 09/19/2017    Pneumococcal Conjugate (PCV-13) 08/07/2017    Pneumococcal Polysaccharide (PPSV-23) 07/09/2015, 07/26/2016    Tdap 07/09/2015     No LMP recorded. Patient has had a hysterectomy. Allergies and Intolerances:    Allergies   Allergen Reactions    Adhesive Other (comments)     Skin burns - red spots    Lisinopril Other (comments)    Nitrofurantoin Other (comments)       Family History:   Family History Problem Relation Age of Onset    Heart Disease Father     Diabetes Father        Social History:   She  reports that she has never smoked. She has never used smokeless tobacco.  She  reports that she does not drink alcohol. Review of Systems:   General: negative for - chills, fatigue, fever, weight change  Psych: negative for - anxiety, depression, irritability or mood swings  ENT: negative for - headaches, hearing change, nasal congestion, oral lesions, sneezing or sore throat  Heme/ Lymph: negative for - bleeding problems, bruising, pallor or swollen lymph nodes  Endo: negative for - hot flashes, polydipsia/polyuria or temperature intolerance  Resp: negative for - cough, shortness of breath or wheezing  CV: negative for - chest pain, edema or palpitations  GI: negative for - abdominal pain, change in bowel habits, constipation, diarrhea or nausea/vomiting  : negative for - dysuria, hematuria, incontinence, pelvic pain or vulvar/vaginal symptoms  MSK: negative for - joint pain, joint swelling or muscle pain, pos for lympghedema  Neuro: negative for - confusion, headaches, seizures or weakness  Derm: negative for - dry skin, hair changes, rash or skin lesion changes, pos for rash on left cheek, pos for itching on left leg          Physical:   Vitals:   Vitals:    10/23/17 1448 10/23/17 1452   BP: 145/86 149/87   Pulse: 71    Resp: 18    Temp: 97.7 °F (36.5 °C)    TempSrc: Oral    Weight: 300 lb 3.2 oz (136.2 kg)    Height: 5' (1.524 m)            Exam:   HEENT- atraumatic,normocephalic, awake, oriented, well nourished, seborrhea on the left cheek  Neck - supple,no enlarged lymph nodes, no JVD, no thyromegaly  Chest- CTA, no rhonchi, no crackles  Heart- rrr, no murmurs / gallop/rub  Abdomen- soft,BS+,NT, no hepatosplenomegaly  Ext - bilateral lymphedema  Neuro- no focal deficits. Power 5/5 all extremities  Skin - warm,dry, no obvious rashes.           Review of Data:   LABS:   Lab Results   Component Value Date/Time    WBC 5.8 10/20/2017 03:50 AM    HGB 10.1 10/20/2017 03:50 AM    HCT 31.2 10/20/2017 03:50 AM    PLATELET 682 35/20/1117 03:50 AM     Lab Results   Component Value Date/Time    Sodium 142 10/20/2017 03:50 AM    Potassium 3.3 10/20/2017 03:50 AM    Chloride 110 10/20/2017 03:50 AM    CO2 23 10/20/2017 03:50 AM    Glucose 129 10/20/2017 03:50 AM    BUN 8 10/20/2017 03:50 AM    Creatinine 0.64 10/20/2017 03:50 AM     Lab Results   Component Value Date/Time    Cholesterol, total 132 10/20/2017 03:50 AM    HDL Cholesterol 72 10/20/2017 03:50 AM    LDL, calculated 41.4 10/20/2017 03:50 AM    Triglyceride 93 10/20/2017 03:50 AM     No results found for: GPT        Impression / Plan:        ICD-10-CM ICD-9-CM    1. Urinary tract infection without hematuria, site unspecified N39.0 599.0 URINALYSIS W/ RFLX MICROSCOPIC      CULTURE, URINE   2. Morbid obesity ([address] E66.01 278.01    3. IFG (impaired fasting glucose) R73.01 790.21    4. Tinea cruris B35.6 110.3 Miconazole powd   5. Seborrhea L21.9 706.3 hydrocortisone (HYCORT) 1 % ointment   6. Other osteoporosis without current pathological fracture M81.8 733.09 alendronate (FOSAMAX) 70 mg tablet     Will stop metfromin, lasix and vitamin d    Explained to patient risk benefits of the medications. Advised patient to stop meds if having any side effects. Pt verbalized understanding of the instructions. I have discussed the diagnosis with the patient and the intended plan as seen in the above orders. The patient has received an after-visit summary and questions were answered concerning future plans. I have discussed medication side effects and warnings with the patient as well. I have reviewed the plan of care with the patient, accepted their input and they are in agreement with the treatment goals. Reviewed plan of care. Patient has provided input and agrees with goals.     Follow-up Disposition: Not on Noemí Grace MD

## 2017-10-23 NOTE — PROGRESS NOTES
1. Have you been to the ER, urgent care clinic since your last visit? Hospitalized since your last visit? 10/19/17 UTI    2. Have you seen or consulted any other health care providers outside of the [facility] since your last visit? Include any pap smears or colon screening.  No

## 2017-10-25 LAB
APPEARANCE UR: CLEAR
BACTERIA SPEC CULT: NORMAL
BACTERIA UR CULT: NO GROWTH
BILIRUB UR QL STRIP: NEGATIVE
COLOR UR: YELLOW
GLUCOSE UR QL: NEGATIVE
HGB UR QL STRIP: NEGATIVE
KETONES UR QL STRIP: NEGATIVE
LEUKOCYTE ESTERASE UR QL STRIP: NEGATIVE
MICRO URNS: NORMAL
NITRITE UR QL STRIP: NEGATIVE
PH UR STRIP: 5.5 [PH] (ref 5–7.5)
PROT UR QL STRIP: NEGATIVE
SERVICE CMNT-IMP: NORMAL
SP GR UR: 1.01 (ref 1–1.03)
UROBILINOGEN UR STRIP-MCNC: 0.2 MG/DL (ref 0.2–1)

## 2017-11-07 ENCOUNTER — HOSPITAL ENCOUNTER (OUTPATIENT)
Dept: LAB | Age: 67
Discharge: HOME | End: 2017-11-07

## 2017-11-07 ENCOUNTER — OFFICE VISIT (OUTPATIENT)
Dept: FAMILY MEDICINE CLINIC | Age: 67
End: 2017-11-07

## 2017-11-07 VITALS
OXYGEN SATURATION: 96 % | WEIGHT: 293 LBS | RESPIRATION RATE: 18 BRPM | SYSTOLIC BLOOD PRESSURE: 142 MMHG | BODY MASS INDEX: 57.52 KG/M2 | DIASTOLIC BLOOD PRESSURE: 79 MMHG | HEART RATE: 94 BPM | HEIGHT: 60 IN | TEMPERATURE: 99.4 F

## 2017-11-07 DIAGNOSIS — R68.83 CHILLS: ICD-10-CM

## 2017-11-07 DIAGNOSIS — L03.116 CELLULITIS OF LEG, LEFT: Primary | ICD-10-CM

## 2017-11-07 DIAGNOSIS — R52 BODY ACHES: ICD-10-CM

## 2017-11-07 LAB
QUICKVUE INFLUENZA TEST: NEGATIVE
S PYO AG THROAT QL: NEGATIVE
VALID INTERNAL CONTROL?: YES
VALID INTERNAL CONTROL?: YES

## 2017-11-07 PROCEDURE — 99001 SPECIMEN HANDLING PT-LAB: CPT | Performed by: INTERNAL MEDICINE

## 2017-11-07 RX ORDER — CEFTRIAXONE 1 G/1
1 INJECTION, POWDER, FOR SOLUTION INTRAMUSCULAR; INTRAVENOUS ONCE
Qty: 1 VIAL | Refills: 0
Start: 2017-11-07 | End: 2017-11-07

## 2017-11-07 RX ORDER — CEPHALEXIN 500 MG/1
500 CAPSULE ORAL 4 TIMES DAILY
Qty: 40 CAP | Refills: 0 | Status: SHIPPED | OUTPATIENT
Start: 2017-11-07 | End: 2017-11-17

## 2017-11-07 NOTE — PROGRESS NOTES
Kevin Vasquez is a 79 y.o.  female and presents with     Chief Complaint   Patient presents with    Chills    Generalized Body Aches       Pt has been having chills, bodyaches and feels run down sicne yesterday. Pt denies any urinary symptoms. Pt denies cough , SOB. Pt deneis abdominal pain , nasuea, vomiting. Pt does have lymphedema in both legs but the legs dont bother her. Pt did go to [place] on Sunday. Past Medical History:   Diagnosis Date    Degenerative disk disease     Depression     Depression     Diffuse idiopathic skeletal hyperostosis     Fracture of leg     Gallstone     Lymphedema of leg 1975    bilateral lower legs    Morbid obesity ([facility] [address]     Pulmonary arterial hypertension     MANPREET (stress urinary incontinence, female)      Past Surgical History:   Procedure Laterality Date    HX HYSTERECTOMY      HX KNEE REPLACEMENT      HX ORTHOPAEDIC      right shoulder and right knee replaced x 2, left knee replaced as well     Current Outpatient Prescriptions   Medication Sig    cefTRIAXone (ROCEPHIN) 1 gram injection 1 g by IntraMUSCular route once for 1 dose.  cephALEXin (KEFLEX) 500 mg capsule Take 1 Cap by mouth four (4) times daily for 10 days.  Miconazole powd Apply twice daily on the left leg    hydrocortisone (HYCORT) 1 % ointment Apply  to affected area two (2) times a day. use thin layer    alendronate (FOSAMAX) 70 mg tablet Take 1 Tab by mouth every seven (7) days.  levoFLOXacin (LEVAQUIN) 750 mg tablet Take 1 Tab by mouth daily.  furosemide (LASIX) 20 mg tablet TAKE ONE TABLET BY MOUTH ONCE DAILY    metroNIDAZOLE (NORITATE) 1 % topical cream Apply  to affected area two (2) times daily (with meals). Use a thin layer to affected areas after washing    metFORMIN (GLUCOPHAGE) 500 mg tablet TAKE ONE TABLET BY MOUTH TWICE DAILY WITH MEALS    losartan (COZAAR) 25 mg tablet Take 1 Tab by mouth daily.     oxybutynin (DITROPAN) 5 mg tablet TAKE ONE TABLET BY MOUTH ONCE DAILY    atorvastatin (LIPITOR) 10 mg tablet Take 1 Tab by mouth daily.  losartan (COZAAR) 25 mg tablet TAKE ONE TABLET BY MOUTH ONCE DAILY    furosemide (LASIX) 40 mg tablet One po daily    acetaminophen-codeine (TYLENOL-CODEINE #3) 300-30 mg per tablet Take 1 Tab by mouth every six (6) hours as needed for Pain. Max Daily Amount: 4 Tabs.  potassium chloride (K-DUR, KLOR-CON) 10 mEq tablet Take 1 Tab by mouth daily.  ergocalciferol (ERGOCALCIFEROL) 50,000 unit capsule Take 1 Cap by mouth every seven (7) days.  pantoprazole (PROTONIX) 40 mg tablet Take 1 Tab by mouth daily.  DULoxetine (CYMBALTA) 60 mg capsule Take 1 Cap by mouth daily.  metFORMIN (GLUCOPHAGE) 1,000 mg tablet Take 1 Tab by mouth two (2) times daily (with meals).  albuterol (PROVENTIL HFA, VENTOLIN HFA, PROAIR HFA) 90 mcg/actuation inhaler Take 2 Puffs by inhalation every six (6) hours as needed for Wheezing. No current facility-administered medications for this visit. Health Maintenance   Topic Date Due    GLAUCOMA SCREENING Q2Y  07/09/2017    MEDICARE YEARLY EXAM  08/08/2018    BREAST CANCER SCRN MAMMOGRAM  12/29/2018    COLONOSCOPY  07/25/2023    DTaP/Tdap/Td series (2 - Td) 07/09/2025    Hepatitis C Screening  Completed    OSTEOPOROSIS SCREENING (DEXA)  Completed    ZOSTER VACCINE AGE 60>  Completed    Pneumococcal 65+ Low/Medium Risk  Completed    Influenza Age 5 to Adult  Completed     Immunization History   Administered Date(s) Administered    Influenza High Dose Vaccine PF 10/19/2015, 08/12/2016    Influenza Vaccine 09/19/2017    Pneumococcal Conjugate (PCV-13) 08/07/2017    Pneumococcal Polysaccharide (PPSV-23) 07/09/2015, 07/26/2016    Tdap 07/09/2015     No LMP recorded. Patient has had a hysterectomy. Allergies and Intolerances:    Allergies   Allergen Reactions    Adhesive Other (comments)     Skin burns - red spots    Lisinopril Other (comments)    Nitrofurantoin Other (comments)       Family History:   Family History   Problem Relation Age of Onset    Heart Disease Father     Diabetes Father        Social History:   She  reports that she has never smoked. She has never used smokeless tobacco.  She  reports that she does not drink alcohol. Review of Systems:   General: positive for - chills, fatigue, fever, bodyaches  Psych: negative for - anxiety, depression, irritability or mood swings  ENT: negative for - headaches, hearing change, nasal congestion, oral lesions, sneezing or sore throat  Heme/ Lymph: negative for - bleeding problems, bruising, pallor or swollen lymph nodes  Endo: negative for - hot flashes, polydipsia/polyuria or temperature intolerance  Resp: negative for - cough, shortness of breath or wheezing  CV: negative for - chest pain, edema or palpitations  GI: negative for - abdominal pain, change in bowel habits, constipation, diarrhea or nausea/vomiting  : negative for - dysuria, hematuria, incontinence, pelvic pain or vulvar/vaginal symptoms  MSK: negative for - joint pain, joint swelling or muscle pain  Neuro: negative for - confusion, headaches, seizures or weakness  Derm: negative for - dry skin, hair changes, rash or skin lesion changes,pos for redness on legs          Physical:   Vitals:   Vitals:    11/07/17 1510   BP: 142/79   Pulse: 94   Resp: 18   Temp: 99.4 °F (37.4 °C)   TempSrc: Oral   SpO2: 96%   Weight: 298 lb (135.2 kg)   Height: 5' (1.524 m)           Exam:   HEENT- atraumatic,normocephalic, awake, oriented, well nourished  Neck - supple,no enlarged lymph nodes, no JVD, no thyromegaly  Chest- CTA, no rhonchi, no crackles  Heart- rrr, no murmurs / gallop/rub  Abdomen- soft,BS+,NT, no hepatosplenomegaly  Ext - bilateral lymphedema. Mild redness on left leg  Neuro- no focal deficits. Power 5/5 all extremities  Skin - warm,dry, no obvious rashes.           Review of Data:   LABS:   Lab Results   Component Value Date/Time    WBC 5.8 10/20/2017 03:50 AM    HGB 10.1 10/20/2017 03:50 AM    HCT 31.2 10/20/2017 03:50 AM    PLATELET 581 91/65/8778 03:50 AM     Lab Results   Component Value Date/Time    Sodium 142 10/20/2017 03:50 AM    Potassium 3.3 10/20/2017 03:50 AM    Chloride 110 10/20/2017 03:50 AM    CO2 23 10/20/2017 03:50 AM    Glucose 129 10/20/2017 03:50 AM    BUN 8 10/20/2017 03:50 AM    Creatinine 0.64 10/20/2017 03:50 AM     Lab Results   Component Value Date/Time    Cholesterol, total 132 10/20/2017 03:50 AM    HDL Cholesterol 72 10/20/2017 03:50 AM    LDL, calculated 41.4 10/20/2017 03:50 AM    Triglyceride 93 10/20/2017 03:50 AM     No results found for: GPT        Impression / Plan:        ICD-10-CM ICD-9-CM    1. Cellulitis of leg, left L03.116 682. 6 cefTRIAXone (ROCEPHIN) 1 gram injection      CEFTRIAXONE SODIUM INJECTION  MG      ID THER/PROPH/DIAG INJECTION, SUBCUT/IM      cephALEXin (KEFLEX) 500 mg capsule   2. Chills R68.83 780.64 CBC WITH AUTOMATED DIFF      LACTIC ACID      METABOLIC PANEL, COMPREHENSIVE   3. Body aches R52 780.96 CBC WITH AUTOMATED DIFF      LACTIC ACID      METABOLIC PANEL, COMPREHENSIVE     Asked pt to go to ER for worsening of symptoms. Explained to patient risk benefits of the medications. Advised patient to stop meds if having any side effects. Pt verbalized understanding of the instructions. I have discussed the diagnosis with the patient and the intended plan as seen in the above orders. The patient has received an after-visit summary and questions were answered concerning future plans. I have discussed medication side effects and warnings with the patient as well. I have reviewed the plan of care with the patient, accepted their input and they are in agreement with the treatment goals. Reviewed plan of care. Patient has provided input and agrees with goals.     Follow-up Disposition: Not on Mary Samayoa MD

## 2017-11-07 NOTE — MR AVS SNAPSHOT
Visit Information Date & Time Provider Department Dept. Phone Encounter #  
 11/7/2017  2:15 PM Sravani Pedro, [address] [facility] [phone] [id number] Follow-up Instructions Return if symptoms worsen or fail to improve. Your Appointments 1/23/2018  2:15 PM  
ROUTINE CARE with Sravani Pedro MD  
[facility]) Appt Note: 3 month f/u for a1c &  vit D recheck [address]  
  
   
 [address] [facility] [address] Upcoming Health Maintenance Date Due  
 GLAUCOMA SCREENING Q2Y 7/9/2017 MEDICARE YEARLY EXAM 8/8/2018 BREAST CANCER SCRN MAMMOGRAM 12/29/2018 COLONOSCOPY 7/25/2023 DTaP/Tdap/Td series (2 - Td) 7/9/2025 Allergies as of 11/7/2017  Review Complete On: 11/7/2017 By: Sravani Pedro MD  
  
 Severity Noted Reaction Type Reactions Adhesive  11/24/2015    Other (comments) Skin burns - red spots Lisinopril  07/09/2015    Other (comments) Nitrofurantoin  05/13/2016    Other (comments) Current Immunizations  Never Reviewed Name Date Influenza High Dose Vaccine PF 8/12/2016, 10/19/2015 Influenza Vaccine 9/19/2017 Pneumococcal Conjugate (PCV-13) 8/7/2017 Pneumococcal Polysaccharide (PPSV-23) 7/26/2016  8:30 AM, 7/9/2015  9:00 AM  
 Tdap 7/9/2015  9:00 AM  
  
 Not reviewed this visit You Were Diagnosed With   
  
 Codes Comments Cellulitis of leg, left    -  Primary ICD-10-CM: U21.914 ICD-9-CM: 253. 6 Chills     ICD-10-CM: R68.83 ICD-9-CM: 780.64 Body aches     ICD-10-CM: R52 ICD-9-CM: 780.96 Vitals BP Pulse Temp Resp Height(growth percentile) Weight(growth percentile) 142/79 94 99.4 °F (37.4 °C) (Oral) 18 5' (1.524 m) 298 lb (135.2 kg) SpO2 BMI OB Status Smoking Status 96% 58.2 kg/m2 Hysterectomy Never Smoker BMI and BSA Data Body Mass Index Body Surface Area  
 58.2 kg/m 2 2.39 m 2 Preferred Pharmacy Pharmacy Name Phone [facility] [address], [place] [phone] Your Updated Medication List  
  
   
This list is accurate as of: 11/7/17  4:14 PM.  Always use your most recent med list.  
  
  
  
  
 acetaminophen-codeine 300-30 mg per tablet Commonly known as:  TYLENOL-CODEINE #3 Take 1 Tab by mouth every six (6) hours as needed for Pain. Max Daily Amount: 4 Tabs. albuterol 90 mcg/actuation inhaler Commonly known as:  PROVENTIL HFA, VENTOLIN HFA, PROAIR HFA Take 2 Puffs by inhalation every six (6) hours as needed for Wheezing. alendronate 70 mg tablet Commonly known as:  FOSAMAX Take 1 Tab by mouth every seven (7) days. atorvastatin 10 mg tablet Commonly known as:  LIPITOR Take 1 Tab by mouth daily. cefTRIAXone 1 gram injection Commonly known as:  ROCEPHIN  
1 g by IntraMUSCular route once for 1 dose. cephALEXin 500 mg capsule Commonly known as:  Dene Decree Take 1 Cap by mouth four (4) times daily for 10 days. DULoxetine 60 mg capsule Commonly known as:  CYMBALTA Take 1 Cap by mouth daily. ergocalciferol 50,000 unit capsule Commonly known as:  ERGOCALCIFEROL Take 1 Cap by mouth every seven (7) days. * furosemide 40 mg tablet Commonly known as:  LASIX One po daily * furosemide 20 mg tablet Commonly known as:  LASIX TAKE ONE TABLET BY MOUTH ONCE DAILY  
  
 hydrocortisone 1 % ointment Commonly known as:  HYCORT Apply  to affected area two (2) times a day. use thin layer  
  
 levoFLOXacin 750 mg tablet Commonly known as:  Isabella Rater Take 1 Tab by mouth daily. * losartan 25 mg tablet Commonly known as:  COZAAR  
TAKE ONE TABLET BY MOUTH ONCE DAILY  
  
 * losartan 25 mg tablet Commonly known as:  COZAAR Take 1 Tab by mouth daily. * metFORMIN 1,000 mg tablet Commonly known as:  GLUCOPHAGE Take 1 Tab by mouth two (2) times daily (with meals). * metFORMIN 500 mg tablet Commonly known as:  GLUCOPHAGE  
TAKE ONE TABLET BY MOUTH TWICE DAILY WITH MEALS  
  
 metroNIDAZOLE 1 % topical cream  
Commonly known as:  Glean Alves Apply  to affected area two (2) times daily (with meals). Use a thin layer to affected areas after washing Miconazole Powd Apply twice daily on the left leg  
  
 oxybutynin 5 mg tablet Commonly known as:  DITROPAN  
TAKE ONE TABLET BY MOUTH ONCE DAILY pantoprazole 40 mg tablet Commonly known as:  PROTONIX Take 1 Tab by mouth daily. potassium chloride 10 mEq tablet Commonly known as:  KLOR-CON Take 1 Tab by mouth daily. * Notice: This list has 6 medication(s) that are the same as other medications prescribed for you. Read the directions carefully, and ask your doctor or other care provider to review them with you. Prescriptions Sent to Pharmacy Refills  
 cephALEXin (KEFLEX) 500 mg capsule 0 Sig: Take 1 Cap by mouth four (4) times daily for 10 days. Class: Normal  
 Pharmacy: [address], [address] Ph #: [phone] Route: Oral  
  
We Performed the Following CEFTRIAXONE SODIUM INJECTION  MG [ [facility]] Comments:  
 Mix per protocol CA THER/PROPH/DIAG INJECTION, SUBCUT/IM [id number] CPT(R)] Follow-up Instructions Return if symptoms worsen or fail to improve. Introducing [facility]! Dear Karena Due: 
Thank you for requesting a [employer] account. Our records indicate that you already have an active [employer] account. You can access your account anytime at [email or web address] Did you know that you can access your hospital and ER discharge instructions at any time in [employer]? You can also review all of your test results from your hospital stay or ER visit. Additional Information If you have questions, please visit the Frequently Asked Questions section of the [employer] website at [email or web address] Remember, [email or web address] is NOT to be used for urgent needs. For medical emergencies, dial 911. Now available from your iPhone and Android! Please provide this summary of care documentation to your next provider. Your primary care clinician is listed as Cindy Jenkins. If you have any questions after today's visit, please call [phone].

## 2017-11-08 DIAGNOSIS — N39.41 URGE INCONTINENCE OF URINE: ICD-10-CM

## 2017-11-08 LAB
ALBUMIN SERPL-MCNC: 3.9 G/DL (ref 3.6–4.8)
ALBUMIN/GLOB SERPL: 1.6 {RATIO} (ref 1.2–2.2)
ALP SERPL-CCNC: 71 IU/L (ref 39–117)
ALT SERPL-CCNC: 9 IU/L (ref 0–32)
AST SERPL-CCNC: 12 IU/L (ref 0–40)
BASOPHILS # BLD AUTO: 0 X10E3/UL (ref 0–0.2)
BASOPHILS NFR BLD AUTO: 0 %
BILIRUB SERPL-MCNC: 2.5 MG/DL (ref 0–1.2)
BUN SERPL-MCNC: 9 MG/DL (ref 8–27)
BUN/CREAT SERPL: 12 (ref 12–28)
CALCIUM SERPL-MCNC: 9.1 MG/DL (ref 8.7–10.3)
CHLORIDE SERPL-SCNC: 102 MMOL/L (ref 96–106)
CO2 SERPL-SCNC: 24 MMOL/L (ref 18–29)
CREAT SERPL-MCNC: 0.76 MG/DL (ref 0.57–1)
EOSINOPHIL # BLD AUTO: 0 X10E3/UL (ref 0–0.4)
EOSINOPHIL NFR BLD AUTO: 0 %
ERYTHROCYTE [DISTWIDTH] IN BLOOD BY AUTOMATED COUNT: 14.1 % (ref 12.3–15.4)
GFR SERPLBLD CREATININE-BSD FMLA CKD-EPI: 81 ML/MIN/1.73
GFR SERPLBLD CREATININE-BSD FMLA CKD-EPI: 94 ML/MIN/1.73
GLOBULIN SER CALC-MCNC: 2.5 G/DL (ref 1.5–4.5)
GLUCOSE SERPL-MCNC: 103 MG/DL (ref 65–99)
HCT VFR BLD AUTO: 35.6 % (ref 34–46.6)
HGB BLD-MCNC: 11.9 G/DL (ref 11.1–15.9)
IMM GRANULOCYTES # BLD: 0 X10E3/UL (ref 0–0.1)
IMM GRANULOCYTES NFR BLD: 0 %
LACTATE SERPL-MCNC: 8.4 MG/DL (ref 4.8–25.7)
LYMPHOCYTES # BLD AUTO: 0.8 X10E3/UL (ref 0.7–3.1)
LYMPHOCYTES NFR BLD AUTO: 10 %
MCH RBC QN AUTO: 29.2 PG (ref 26.6–33)
MCHC RBC AUTO-ENTMCNC: 33.4 G/DL (ref 31.5–35.7)
MCV RBC AUTO: 88 FL (ref 79–97)
MONOCYTES # BLD AUTO: 0.7 X10E3/UL (ref 0.1–0.9)
MONOCYTES NFR BLD AUTO: 8 %
NEUTROPHILS # BLD AUTO: 6.9 X10E3/UL (ref 1.4–7)
NEUTROPHILS NFR BLD AUTO: 82 %
PLATELET # BLD AUTO: 232 X10E3/UL (ref 150–379)
POTASSIUM SERPL-SCNC: 3.9 MMOL/L (ref 3.5–5.2)
PROT SERPL-MCNC: 6.4 G/DL (ref 6–8.5)
RBC # BLD AUTO: 4.07 X10E6/UL (ref 3.77–5.28)
SODIUM SERPL-SCNC: 142 MMOL/L (ref 134–144)
WBC # BLD AUTO: 8.4 X10E3/UL (ref 3.4–10.8)

## 2017-11-09 ENCOUNTER — OFFICE VISIT (OUTPATIENT)
Dept: FAMILY MEDICINE CLINIC | Age: 67
End: 2017-11-09

## 2017-11-09 VITALS
TEMPERATURE: 98.1 F | SYSTOLIC BLOOD PRESSURE: 120 MMHG | DIASTOLIC BLOOD PRESSURE: 71 MMHG | WEIGHT: 293 LBS | RESPIRATION RATE: 16 BRPM | HEART RATE: 67 BPM | OXYGEN SATURATION: 98 % | HEIGHT: 60 IN | BODY MASS INDEX: 57.52 KG/M2

## 2017-11-09 DIAGNOSIS — I89.0 LYMPHEDEMA: ICD-10-CM

## 2017-11-09 DIAGNOSIS — E80.6 HYPERBILIRUBINEMIA: ICD-10-CM

## 2017-11-09 DIAGNOSIS — B35.6 TINEA CRURIS: Primary | ICD-10-CM

## 2017-11-09 RX ORDER — CHLORPHENIRAMINE MALEATE 4 MG
TABLET ORAL 2 TIMES DAILY
Qty: 113 G | Refills: 3 | Status: SHIPPED | OUTPATIENT
Start: 2017-11-09 | End: 2019-05-16

## 2017-11-09 RX ORDER — OXYBUTYNIN CHLORIDE 5 MG/1
TABLET ORAL
Qty: 90 TAB | Refills: 0 | Status: SHIPPED | OUTPATIENT
Start: 2017-11-09 | End: 2018-03-02 | Stop reason: SDUPTHER

## 2017-11-09 NOTE — PROGRESS NOTES
1. Have you been to the ER, urgent care clinic since your last visit? Hospitalized since your last visit? No    2. Have you seen or consulted any other health care providers outside of the [address] [place] [address] since your last visit? Include any pap smears or colon screening.  No

## 2017-11-09 NOTE — PROGRESS NOTES
Ye Neely is a 79 y.o.  female and presents with     Chief Complaint   Patient presents with    Skin Infection    Ringworm       Pt says she is feeling much better. Her redness on left leg has resolved. She is not having any chills or fevers. Pt is taking her abx. Pt  Has mild redness and itching in the fold of the lymphedema. Pt is not sure why she has lymphedema. Pt did travel to [facility] and the [facility]. Pt wants to know if there is any treatment for lymphedema          Past Medical History:   Diagnosis Date    Degenerative disk disease     Depression     Depression     Diffuse idiopathic skeletal hyperostosis     Fracture of leg     Gallstone     Lymphedema of leg 1975    bilateral lower legs    Morbid obesity ([address]     Pulmonary arterial hypertension     MANPREET (stress urinary incontinence, female)      Past Surgical History:   Procedure Laterality Date    HX HYSTERECTOMY      HX KNEE REPLACEMENT      HX ORTHOPAEDIC      right shoulder and right knee replaced x 2, left knee replaced as well     Current Outpatient Prescriptions   Medication Sig    clotrimazole (LOTRIMIN) 1 % topical cream Apply  to affected area two (2) times a day.  oxybutynin (DITROPAN) 5 mg tablet TAKE ONE TABLET BY MOUTH ONCE DAILY    cephALEXin (KEFLEX) 500 mg capsule Take 1 Cap by mouth four (4) times daily for 10 days.  Miconazole powd Apply twice daily on the left leg    hydrocortisone (HYCORT) 1 % ointment Apply  to affected area two (2) times a day. use thin layer    alendronate (FOSAMAX) 70 mg tablet Take 1 Tab by mouth every seven (7) days.  levoFLOXacin (LEVAQUIN) 750 mg tablet Take 1 Tab by mouth daily.  furosemide (LASIX) 20 mg tablet TAKE ONE TABLET BY MOUTH ONCE DAILY    metroNIDAZOLE (NORITATE) 1 % topical cream Apply  to affected area two (2) times daily (with meals).  Use a thin layer to affected areas after washing    metFORMIN (GLUCOPHAGE) 500 mg tablet TAKE ONE TABLET BY MOUTH TWICE DAILY WITH MEALS    losartan (COZAAR) 25 mg tablet Take 1 Tab by mouth daily.  atorvastatin (LIPITOR) 10 mg tablet Take 1 Tab by mouth daily.  losartan (COZAAR) 25 mg tablet TAKE ONE TABLET BY MOUTH ONCE DAILY    furosemide (LASIX) 40 mg tablet One po daily    acetaminophen-codeine (TYLENOL-CODEINE #3) 300-30 mg per tablet Take 1 Tab by mouth every six (6) hours as needed for Pain. Max Daily Amount: 4 Tabs.  potassium chloride (K-DUR, KLOR-CON) 10 mEq tablet Take 1 Tab by mouth daily.  ergocalciferol (ERGOCALCIFEROL) 50,000 unit capsule Take 1 Cap by mouth every seven (7) days.  pantoprazole (PROTONIX) 40 mg tablet Take 1 Tab by mouth daily.  DULoxetine (CYMBALTA) 60 mg capsule Take 1 Cap by mouth daily.  metFORMIN (GLUCOPHAGE) 1,000 mg tablet Take 1 Tab by mouth two (2) times daily (with meals).  albuterol (PROVENTIL HFA, VENTOLIN HFA, PROAIR HFA) 90 mcg/actuation inhaler Take 2 Puffs by inhalation every six (6) hours as needed for Wheezing. No current facility-administered medications for this visit. Health Maintenance   Topic Date Due    GLAUCOMA SCREENING Q2Y  07/09/2017    MEDICARE YEARLY EXAM  08/08/2018    BREAST CANCER SCRN MAMMOGRAM  12/29/2018    COLONOSCOPY  07/25/2023    DTaP/Tdap/Td series (2 - Td) 07/09/2025    Hepatitis C Screening  Completed    OSTEOPOROSIS SCREENING (DEXA)  Completed    ZOSTER VACCINE AGE 60>  Completed    Pneumococcal 65+ Low/Medium Risk  Completed    Influenza Age 5 to Adult  Completed     Immunization History   Administered Date(s) Administered    Influenza High Dose Vaccine PF 10/19/2015, 08/12/2016    Influenza Vaccine 09/19/2017    Pneumococcal Conjugate (PCV-13) 08/07/2017    Pneumococcal Polysaccharide (PPSV-23) 07/09/2015, 07/26/2016    Tdap 07/09/2015     No LMP recorded. Patient has had a hysterectomy. Allergies and Intolerances:    Allergies   Allergen Reactions    Adhesive Other (comments)     Skin burns - red spots    Lisinopril Other (comments)    Nitrofurantoin Other (comments)       Family History:   Family History   Problem Relation Age of Onset    Heart Disease Father     Diabetes Father        Social History:   She  reports that she has never smoked. She has never used smokeless tobacco.  She  reports that she does not drink alcohol. Review of Systems:   General: negative for - chills, fatigue, fever, weight change  Psych: negative for - anxiety, depression, irritability or mood swings  ENT: negative for - headaches, hearing change, nasal congestion, oral lesions, sneezing or sore throat  Heme/ Lymph: negative for - bleeding problems, bruising, pallor or swollen lymph nodes  Endo: negative for - hot flashes, polydipsia/polyuria or temperature intolerance  Resp: negative for - cough, shortness of breath or wheezing  CV: negative for - chest pain, edema or palpitations  GI: negative for - abdominal pain, change in bowel habits, constipation, diarrhea or nausea/vomiting  : negative for - dysuria, hematuria, incontinence, pelvic pain or vulvar/vaginal symptoms  MSK: negative for - joint pain, joint swelling or muscle pain  Neuro: negative for - confusion, headaches, seizures or weakness  Derm: negative for - dry skin, hair changes, rash or skin lesion changes, pos for itching in the skin fold in the leg          Physical:   Vitals:   Vitals:    11/09/17 1318   BP: 120/71   Pulse: 67   Resp: 16   Temp: 98.1 °F (36.7 °C)   TempSrc: Oral   SpO2: 98%   Weight: 295 lb (133.8 kg)   Height: 5' (1.524 m)           Exam:   HEENT- atraumatic,normocephalic, awake, oriented, well nourished  Neck - supple,no enlarged lymph nodes, no JVD, no thyromegaly  Chest- CTA, no rhonchi, no crackles  Heart- rrr, no murmurs / gallop/rub  Abdomen- soft,BS+,NT, no hepatosplenomegaly  Ext - no c/c/edema , pos  lymphedema, tinea cruris  In the skin folds of the lower ext ,  Neuro- no focal deficits. Power 5/5 all extremities  Skin - warm,dry, no obvious rashes. Review of Data:   LABS:   Lab Results   Component Value Date/Time    WBC 8.4 11/07/2017 03:46 AM    HGB 11.9 11/07/2017 03:46 AM    HCT 35.6 11/07/2017 03:46 AM    PLATELET 078 49/66/0543 03:46 AM     Lab Results   Component Value Date/Time    Sodium 142 11/07/2017 03:46 AM    Potassium 3.9 11/07/2017 03:46 AM    Chloride 102 11/07/2017 03:46 AM    CO2 24 11/07/2017 03:46 AM    Glucose 103 11/07/2017 03:46 AM    BUN 9 11/07/2017 03:46 AM    Creatinine 0.76 11/07/2017 03:46 AM     Lab Results   Component Value Date/Time    Cholesterol, total 132 10/20/2017 03:50 AM    HDL Cholesterol 72 10/20/2017 03:50 AM    LDL, calculated 41.4 10/20/2017 03:50 AM    Triglyceride 93 10/20/2017 03:50 AM     No results found for: GPT        Impression / Plan:        ICD-10-CM ICD-9-CM    1. Tinea cruris B35.6 110.3 clotrimazole (LOTRIMIN) 1 % topical cream   2. Lymphedema I89.0 457.1 REFERRAL TO PLASTIC SURGERY   3. Hyperbilirubinemia E80.6 782.4 HEPATIC FUNCTION PANEL      BILIRUBIN, FRACTIONATED     Cellulitis in left leg - improved. Finish keflex course. Mild hyperbilirubenmia      Explained to patient risk benefits of the medications. Advised patient to stop meds if having any side effects. Pt verbalized understanding of the instructions. I have discussed the diagnosis with the patient and the intended plan as seen in the above orders. The patient has received an after-visit summary and questions were answered concerning future plans. I have discussed medication side effects and warnings with the patient as well. I have reviewed the plan of care with the patient, accepted their input and they are in agreement with the treatment goals. Reviewed plan of care. Patient has provided input and agrees with goals.     Follow-up Disposition: Not on Brett Schumacher MD

## 2017-11-09 NOTE — MR AVS SNAPSHOT
Visit Information Date & Time Provider Department Dept. Phone Encounter #  
 11/9/2017  1:15 PM Trini Fletcher, [address] [phone] [id number] Follow-up Instructions Return in about 3 months (around 2/9/2018). Your Appointments 1/23/2018  2:15 PM  
ROUTINE CARE with Trini Fletcher MD  
[facility] [address] [facility]) Appt Note: 3 month f/u for a1c &  vit D recheck [address]  
  
   
 [address] [place] [address] [address] Upcoming Health Maintenance Date Due  
 GLAUCOMA SCREENING Q2Y 7/9/2017 MEDICARE YEARLY EXAM 8/8/2018 BREAST CANCER SCRN MAMMOGRAM 12/29/2018 COLONOSCOPY 7/25/2023 DTaP/Tdap/Td series (2 - Td) 7/9/2025 Allergies as of 11/9/2017  Review Complete On: 11/9/2017 By: Trini Fletcher MD  
  
 Severity Noted Reaction Type Reactions Adhesive  11/24/2015    Other (comments) Skin burns - red spots Lisinopril  07/09/2015    Other (comments) Nitrofurantoin  05/13/2016    Other (comments) Current Immunizations  Never Reviewed Name Date Influenza High Dose Vaccine PF 8/12/2016, 10/19/2015 Influenza Vaccine 9/19/2017 Pneumococcal Conjugate (PCV-13) 8/7/2017 Pneumococcal Polysaccharide (PPSV-23) 7/26/2016  8:30 AM, 7/9/2015  9:00 AM  
 Tdap 7/9/2015  9:00 AM  
  
 Not reviewed this visit You Were Diagnosed With   
  
 Codes Comments Tinea cruris    -  Primary ICD-10-CM: B35.6 ICD-9-CM: 110.3 Lymphedema     ICD-10-CM: I89.0 ICD-9-CM: 026.9 Hyperbilirubinemia     ICD-10-CM: E80.6 ICD-9-CM: 460. 4 Vitals BP Pulse Temp Resp Height(growth percentile) Weight(growth percentile) 120/71 67 98.1 °F (36.7 °C) (Oral) 16 5' (1.524 m) 295 lb (133.8 kg) SpO2 BMI OB Status Smoking Status 98% 57.61 kg/m2 Hysterectomy Never Smoker Vitals History BMI and BSA Data Body Mass Index Body Surface Area  
 57.61 kg/m 2 2.38 m 2 Preferred Pharmacy Pharmacy Name Phone [facility] [address], [address] [phone] Your Updated Medication List  
  
   
This list is accurate as of: 11/9/17  2:14 PM.  Always use your most recent med list.  
  
  
  
  
 acetaminophen-codeine 300-30 mg per tablet Commonly known as:  TYLENOL-CODEINE #3 Take 1 Tab by mouth every six (6) hours as needed for Pain. Max Daily Amount: 4 Tabs. albuterol 90 mcg/actuation inhaler Commonly known as:  PROVENTIL HFA, VENTOLIN HFA, PROAIR HFA Take 2 Puffs by inhalation every six (6) hours as needed for Wheezing. alendronate 70 mg tablet Commonly known as:  FOSAMAX Take 1 Tab by mouth every seven (7) days. atorvastatin 10 mg tablet Commonly known as:  LIPITOR Take 1 Tab by mouth daily. cephALEXin 500 mg capsule Commonly known as:  Elle Hides Take 1 Cap by mouth four (4) times daily for 10 days. clotrimazole 1 % topical cream  
Commonly known as:  [place] Apply  to affected area two (2) times a day. DULoxetine 60 mg capsule Commonly known as:  CYMBALTA Take 1 Cap by mouth daily. ergocalciferol 50,000 unit capsule Commonly known as:  ERGOCALCIFEROL Take 1 Cap by mouth every seven (7) days. * furosemide 40 mg tablet Commonly known as:  LASIX One po daily * furosemide 20 mg tablet Commonly known as:  LASIX TAKE ONE TABLET BY MOUTH ONCE DAILY  
  
 hydrocortisone 1 % ointment Commonly known as:  HYCORT Apply  to affected area two (2) times a day. use thin layer  
  
 levoFLOXacin 750 mg tablet Commonly known as:  Kenard Frederic Take 1 Tab by mouth daily. * losartan 25 mg tablet Commonly known as:  COZAAR  
TAKE ONE TABLET BY MOUTH ONCE DAILY  
  
 * losartan 25 mg tablet Commonly known as:  COZAAR Take 1 Tab by mouth daily. * metFORMIN 1,000 mg tablet Commonly known as:  GLUCOPHAGE Take 1 Tab by mouth two (2) times daily (with meals). * metFORMIN 500 mg tablet Commonly known as:  GLUCOPHAGE  
TAKE ONE TABLET BY MOUTH TWICE DAILY WITH MEALS  
  
 metroNIDAZOLE 1 % topical cream  
Commonly known as:  [place] Salts Apply  to affected area two (2) times daily (with meals). Use a thin layer to affected areas after washing Miconazole Powd Apply twice daily on the left leg  
  
 oxybutynin 5 mg tablet Commonly known as:  DITROPAN  
TAKE ONE TABLET BY MOUTH ONCE DAILY pantoprazole 40 mg tablet Commonly known as:  PROTONIX Take 1 Tab by mouth daily. potassium chloride 10 mEq tablet Commonly known as:  KLOR-CON Take 1 Tab by mouth daily. * Notice: This list has 6 medication(s) that are the same as other medications prescribed for you. Read the directions carefully, and ask your doctor or other care provider to review them with you. Prescriptions Sent to Pharmacy Refills  
 clotrimazole (LOTRIMIN) 1 % topical cream 3 Sig: Apply  to affected area two (2) times a day. Class: Normal  
 Pharmacy: [address] [address], [address] Ph #: [phone] Route: Topical  
  
We Performed the Following REFERRAL TO PLASTIC SURGERY [REF89 Custom] Follow-up Instructions Return in about 3 months (around 2/9/2018). To-Do List   
 11/09/2017 Lab:  BILIRUBIN, FRACTIONATED   
  
 11/09/2017 Lab:  HEPATIC FUNCTION PANEL   
  
 11/09/2017 Lab:  MISC. LAB TEST Referral Information Referral ID Referred By Referred To  
  
 [id number] [facility], [address] [place] [address] Garth Redman MD   
   [facility] [address] [facility] Phone: [phone] Fax: [phone] Visits Status Start Date End Date 99 Authorized 11/9/17 11/9/18  If your referral has a status of pending review or denied, additional information will be sent to support the outcome of this decision. Introducing [facility]! Dear Kadeem Hayes: 
Thank you for requesting a [employer] account. Our records indicate that you already have an active [employer] account. You can access your account anytime at [email or web address]/"[email or web address] Did you know that you can access your hospital and ER discharge instructions at any time in [employer]? You can also review all of your test results from your hospital stay or ER visit. Additional Information If you have questions, please visit the Frequently Asked Questions section of the [employer] website at [email or web address]/"[email or web address]/. Remember, [employer] is NOT to be used for urgent needs. For medical emergencies, dial 911. Now available from your iPhone and Android! Please provide this summary of care documentation to your next provider. Your primary care clinician is listed as Sophia Hensley. If you have any questions after today's visit, please call [phone].

## 2017-11-19 DIAGNOSIS — F32.89 OTHER DEPRESSION: ICD-10-CM

## 2017-11-19 RX ORDER — DULOXETIN HYDROCHLORIDE 60 MG/1
CAPSULE, DELAYED RELEASE ORAL
Qty: 90 CAP | Refills: 3 | Status: SHIPPED | OUTPATIENT
Start: 2017-11-19 | End: 2018-09-10 | Stop reason: SDUPTHER

## 2017-11-28 DIAGNOSIS — K21.9 GASTROESOPHAGEAL REFLUX DISEASE WITHOUT ESOPHAGITIS: ICD-10-CM

## 2017-11-29 RX ORDER — PANTOPRAZOLE SODIUM 40 MG/1
TABLET, DELAYED RELEASE ORAL
Qty: 90 TAB | Refills: 3 | Status: SHIPPED | OUTPATIENT
Start: 2017-11-29 | End: 2018-12-26 | Stop reason: SDUPTHER

## 2018-01-30 ENCOUNTER — DOCUMENTATION ONLY (OUTPATIENT)
Dept: FAMILY MEDICINE CLINIC | Age: 68
End: 2018-01-30

## 2018-02-15 DIAGNOSIS — M79.89 LEG SWELLING: ICD-10-CM

## 2018-02-18 RX ORDER — POTASSIUM CHLORIDE 750 MG/1
TABLET, FILM COATED, EXTENDED RELEASE ORAL
Qty: 90 TAB | Refills: 1 | Status: SHIPPED | OUTPATIENT
Start: 2018-02-18 | End: 2018-08-22 | Stop reason: SDUPTHER

## 2018-02-21 ENCOUNTER — OFFICE VISIT (OUTPATIENT)
Dept: FAMILY MEDICINE CLINIC | Age: 68
End: 2018-02-21

## 2018-02-21 VITALS
DIASTOLIC BLOOD PRESSURE: 82 MMHG | RESPIRATION RATE: 18 BRPM | SYSTOLIC BLOOD PRESSURE: 123 MMHG | OXYGEN SATURATION: 96 % | TEMPERATURE: 98.7 F | HEIGHT: 60 IN | HEART RATE: 82 BPM | WEIGHT: 288 LBS | BODY MASS INDEX: 56.54 KG/M2

## 2018-02-21 DIAGNOSIS — R30.0 BURNING WITH URINATION: Primary | ICD-10-CM

## 2018-02-21 DIAGNOSIS — N39.0 URINARY TRACT INFECTION WITHOUT HEMATURIA, SITE UNSPECIFIED: ICD-10-CM

## 2018-02-21 LAB
BILIRUB UR QL STRIP: NEGATIVE
GLUCOSE UR-MCNC: NEGATIVE MG/DL
KETONES P FAST UR STRIP-MCNC: NEGATIVE MG/DL
PH UR STRIP: 5.5 [PH] (ref 4.6–8)
PROT UR QL STRIP: NORMAL
SP GR UR STRIP: 1.02 (ref 1–1.03)
UA UROBILINOGEN AMB POC: NORMAL (ref 0.2–1)
URINALYSIS CLARITY POC: NORMAL
URINALYSIS COLOR POC: NORMAL
URINE BLOOD POC: NORMAL
URINE LEUKOCYTES POC: NORMAL
URINE NITRITES POC: NEGATIVE

## 2018-02-21 RX ORDER — CIPROFLOXACIN 500 MG/1
500 TABLET ORAL 2 TIMES DAILY
Qty: 10 TAB | Refills: 0 | Status: SHIPPED | OUTPATIENT
Start: 2018-02-21 | End: 2018-02-26 | Stop reason: ALTCHOICE

## 2018-02-21 NOTE — PROGRESS NOTES
Shai Cobos is a 76 y.o.  female and presents with     Chief Complaint   Patient presents with    Dysuria       Pt has symptoms of dysuria. Pt thinks she has UTI. Pt denies fever or chills. Past Medical History:   Diagnosis Date    Degenerative disk disease     Depression     Depression     Diffuse idiopathic skeletal hyperostosis     Fracture of leg     Gallstone     Lymphedema of leg 1975    bilateral lower legs    Morbid obesity ([address]     Pulmonary arterial hypertension     MANPREET (stress urinary incontinence, female)      Past Surgical History:   Procedure Laterality Date    HX HYSTERECTOMY      HX KNEE REPLACEMENT      HX ORTHOPAEDIC      right shoulder and right knee replaced x 2, left knee replaced as well     Current Outpatient Prescriptions   Medication Sig    ciprofloxacin HCl (CIPRO) 500 mg tablet Take 1 Tab by mouth two (2) times a day for 5 days.  acetaminophen-codeine (TYLENOL-CODEINE #3) 300-30 mg per tablet Take 1 Tab by mouth every six (6) hours as needed for Pain. Max Daily Amount: 4 Tabs.  albuterol (PROVENTIL HFA, VENTOLIN HFA, PROAIR HFA) 90 mcg/actuation inhaler Take 2 Puffs by inhalation every six (6) hours as needed for Wheezing.  potassium chloride SR (KLOR-CON 10) 10 mEq tablet TAKE ONE TABLET BY MOUTH ONCE DAILY    pantoprazole (PROTONIX) 40 mg tablet TAKE ONE TABLET BY MOUTH ONCE DAILY    DULoxetine (CYMBALTA) 60 mg capsule TAKE ONE CAPSULE BY MOUTH ONCE DAILY    oxybutynin (DITROPAN) 5 mg tablet TAKE ONE TABLET BY MOUTH ONCE DAILY    clotrimazole (LOTRIMIN) 1 % topical cream Apply  to affected area two (2) times a day.  Miconazole powd Apply twice daily on the left leg    hydrocortisone (HYCORT) 1 % ointment Apply  to affected area two (2) times a day. use thin layer    alendronate (FOSAMAX) 70 mg tablet Take 1 Tab by mouth every seven (7) days.     furosemide (LASIX) 20 mg tablet TAKE ONE TABLET BY MOUTH ONCE DAILY    metroNIDAZOLE (NORITATE) 1 % topical cream Apply  to affected area two (2) times daily (with meals). Use a thin layer to affected areas after washing    metFORMIN (GLUCOPHAGE) 500 mg tablet TAKE ONE TABLET BY MOUTH TWICE DAILY WITH MEALS    losartan (COZAAR) 25 mg tablet Take 1 Tab by mouth daily.  atorvastatin (LIPITOR) 10 mg tablet Take 1 Tab by mouth daily.  losartan (COZAAR) 25 mg tablet TAKE ONE TABLET BY MOUTH ONCE DAILY    furosemide (LASIX) 40 mg tablet One po daily    ergocalciferol (ERGOCALCIFEROL) 50,000 unit capsule Take 1 Cap by mouth every seven (7) days.  metFORMIN (GLUCOPHAGE) 1,000 mg tablet Take 1 Tab by mouth two (2) times daily (with meals). No current facility-administered medications for this visit. Health Maintenance   Topic Date Due    GLAUCOMA SCREENING Q2Y  07/09/2017    MEDICARE YEARLY EXAM  08/08/2018    BREAST CANCER SCRN MAMMOGRAM  01/02/2020    COLONOSCOPY  07/25/2023    DTaP/Tdap/Td series (2 - Td) 07/09/2025    Hepatitis C Screening  Completed    OSTEOPOROSIS SCREENING (DEXA)  Completed    ZOSTER VACCINE AGE 60>  Completed    Pneumococcal 65+ Low/Medium Risk  Completed    Influenza Age 5 to Adult  Completed     Immunization History   Administered Date(s) Administered    Influenza High Dose Vaccine PF 10/19/2015, 08/12/2016    Influenza Vaccine 09/19/2017    Pneumococcal Conjugate (PCV-13) 08/07/2017    Pneumococcal Polysaccharide (PPSV-23) 07/09/2015, 07/26/2016    Tdap 07/09/2015     No LMP recorded. Patient has had a hysterectomy. Allergies and Intolerances: Allergies   Allergen Reactions    Adhesive Other (comments)     Skin burns - red spots    Lisinopril Other (comments)    Nitrofurantoin Other (comments)       Family History:   Family History   Problem Relation Age of Onset    Heart Disease Father     Diabetes Father        Social History:   She  reports that she has never smoked.  She has never used smokeless tobacco.  She  reports that she does not drink alcohol. Review of Systems:   General: negative for - chills, fatigue, fever, weight change  Psych: negative for - anxiety, depression, irritability or mood swings  ENT: negative for - headaches, hearing change, nasal congestion, oral lesions, sneezing or sore throat  Heme/ Lymph: negative for - bleeding problems, bruising, pallor or swollen lymph nodes  Endo: negative for - hot flashes, polydipsia/polyuria or temperature intolerance  Resp: negative for - cough, shortness of breath or wheezing  CV: negative for - chest pain, edema or palpitations  GI: negative for - abdominal pain, change in bowel habits, constipation, diarrhea or nausea/vomiting  : negative for - dysuria, hematuria, incontinence, pelvic pain or vulvar/vaginal symptoms  MSK: negative for - joint pain, joint swelling or muscle pain  Neuro: negative for - confusion, headaches, seizures or weakness  Derm: negative for - dry skin, hair changes, rash or skin lesion changes          Physical:   Vitals:   Vitals:    02/21/18 1537   BP: 123/82   Pulse: 82   Resp: 18   Temp: 98.7 °F (37.1 °C)   TempSrc: Oral   SpO2: 96%   Weight: 288 lb (130.6 kg)   Height: 5' (1.524 m)           Exam:   HEENT- atraumatic,normocephalic, awake, oriented, well nourished  Neck - supple,no enlarged lymph nodes, no JVD, no thyromegaly  Chest- CTA, no rhonchi, no crackles  Heart- rrr, no murmurs / gallop/rub  Abdomen- soft,BS+,NT, no hepatosplenomegaly  Ext - no c/c/edema   Neuro- no focal deficits. Power 5/5 all extremities  Skin - warm,dry, no obvious rashes.           Review of Data:   LABS:   Lab Results   Component Value Date/Time    WBC 8.4 11/07/2017 03:46 AM    HGB 11.9 11/07/2017 03:46 AM    HCT 35.6 11/07/2017 03:46 AM    PLATELET 832 06/54/8539 03:46 AM     Lab Results   Component Value Date/Time    Sodium 142 11/07/2017 03:46 AM    Potassium 3.9 11/07/2017 03:46 AM    Chloride 102 11/07/2017 03:46 AM    CO2 24 11/07/2017 03:46 AM    Glucose 103 (H) 11/07/2017 03:46 AM    BUN 9 11/07/2017 03:46 AM    Creatinine 0.76 11/07/2017 03:46 AM     Lab Results   Component Value Date/Time    Cholesterol, total 132 10/20/2017 03:50 AM    HDL Cholesterol 72 (H) 10/20/2017 03:50 AM    LDL, calculated 41.4 10/20/2017 03:50 AM    Triglyceride 93 10/20/2017 03:50 AM     No results found for: GPT        Impression / Plan:        ICD-10-CM ICD-9-CM    1. Burning with urination R30.0 788.1 AMB POC URINALYSIS DIP STICK AUTO W/O MICRO   2. Urinary tract infection without hematuria, site unspecified N39.0 599.0 ciprofloxacin HCl (CIPRO) 500 mg tablet         Explained to patient risk benefits of the medications. Advised patient to stop meds if having any side effects. Pt verbalized understanding of the instructions. I have discussed the diagnosis with the patient and the intended plan as seen in the above orders. The patient has received an after-visit summary and questions were answered concerning future plans. I have discussed medication side effects and warnings with the patient as well. I have reviewed the plan of care with the patient, accepted their input and they are in agreement with the treatment goals. Reviewed plan of care. Patient has provided input and agrees with goals.     Follow-up Disposition: Not on David Light MD

## 2018-02-21 NOTE — MR AVS SNAPSHOT
[address] [place] [address] [phone] 
[phone] Patient: Jessica Tracey MRN: [id number] MSB:[birth year] Visit Information Date & Time Provider Department Dept. Phone Encounter #  
 2/21/2018  3:30 PM [id number], [address] [phone] Follow-up Instructions Return in about 3 months (around 5/21/2018). Upcoming Health Maintenance Date Due  
 GLAUCOMA SCREENING Q2Y 7/9/2017 MEDICARE YEARLY EXAM 8/8/2018 BREAST CANCER SCRN MAMMOGRAM 1/2/2020 COLONOSCOPY 7/25/2023 DTaP/Tdap/Td series (2 - Td) 7/9/2025 Allergies as of 2/21/2018  Review Complete On: 2/21/2018 By: Mable Nichols LPN Severity Noted Reaction Type Reactions Adhesive  11/24/2015    Other (comments) Skin burns - red spots Lisinopril  07/09/2015    Other (comments) Nitrofurantoin  05/13/2016    Other (comments) Current Immunizations  Never Reviewed Name Date Influenza High Dose Vaccine PF 8/12/2016, 10/19/2015 Influenza Vaccine 9/19/2017 Pneumococcal Conjugate (PCV-13) 8/7/2017 Pneumococcal Polysaccharide (PPSV-23) 7/26/2016  8:30 AM, 7/9/2015  9:00 AM  
 Tdap 7/9/2015  9:00 AM  
  
 Not reviewed this visit You Were Diagnosed With   
  
 Codes Comments Burning with urination    -  Primary ICD-10-CM: R30.0 ICD-9-CM: 788.1 Urinary tract infection without hematuria, site unspecified     ICD-10-CM: N39.0 ICD-9-CM: 599.0 Vitals BP Pulse Temp Resp Height(growth percentile) Weight(growth percentile) 123/82 82 98.7 °F (37.1 °C) (Oral) 18 5' (1.524 m) 288 lb (130.6 kg) SpO2 BMI OB Status Smoking Status 96% 56.25 kg/m2 Hysterectomy Never Smoker Vitals History BMI and BSA Data Body Mass Index Body Surface Area  
 56.25 kg/m 2 2.35 m 2 Preferred Pharmacy Pharmacy Name Phone [address], [address] [phone] Your Updated Medication List  
  
   
This list is accurate as of 2/21/18  4:31 PM.  Always use your most recent med list.  
  
  
  
  
 acetaminophen-codeine 300-30 mg per tablet Commonly known as:  TYLENOL-CODEINE #3 Take 1 Tab by mouth every six (6) hours as needed for Pain. Max Daily Amount: 4 Tabs. albuterol 90 mcg/actuation inhaler Commonly known as:  PROVENTIL HFA, VENTOLIN HFA, PROAIR HFA Take 2 Puffs by inhalation every six (6) hours as needed for Wheezing. alendronate 70 mg tablet Commonly known as:  FOSAMAX Take 1 Tab by mouth every seven (7) days. atorvastatin 10 mg tablet Commonly known as:  LIPITOR Take 1 Tab by mouth daily. ciprofloxacin HCl 500 mg tablet Commonly known as:  CIPRO Take 1 Tab by mouth two (2) times a day for 5 days. clotrimazole 1 % topical cream  
Commonly known as:  Ulyess Hands Apply  to affected area two (2) times a day. DULoxetine 60 mg capsule Commonly known as:  CYMBALTA TAKE ONE CAPSULE BY MOUTH ONCE DAILY  
  
 ergocalciferol 50,000 unit capsule Commonly known as:  ERGOCALCIFEROL Take 1 Cap by mouth every seven (7) days. * furosemide 40 mg tablet Commonly known as:  LASIX One po daily * furosemide 20 mg tablet Commonly known as:  LASIX TAKE ONE TABLET BY MOUTH ONCE DAILY  
  
 hydrocortisone 1 % ointment Commonly known as:  HYCORT Apply  to affected area two (2) times a day. use thin layer * losartan 25 mg tablet Commonly known as:  COZAAR  
TAKE ONE TABLET BY MOUTH ONCE DAILY  
  
 * losartan 25 mg tablet Commonly known as:  COZAAR Take 1 Tab by mouth daily. * metFORMIN 1,000 mg tablet Commonly known as:  GLUCOPHAGE Take 1 Tab by mouth two (2) times daily (with meals). * metFORMIN 500 mg tablet Commonly known as:  GLUCOPHAGE  
TAKE ONE TABLET BY MOUTH TWICE DAILY WITH MEALS  
  
 metroNIDAZOLE 1 % topical cream  
Commonly known as:  Angeline Garter Apply  to affected area two (2) times daily (with meals). Use a thin layer to affected areas after washing Miconazole Powd Apply twice daily on the left leg  
  
 oxybutynin 5 mg tablet Commonly known as:  DITROPAN  
TAKE ONE TABLET BY MOUTH ONCE DAILY pantoprazole 40 mg tablet Commonly known as:  PROTONIX  
TAKE ONE TABLET BY MOUTH ONCE DAILY potassium chloride SR 10 mEq tablet Commonly known as:  KLOR-CON 10  
TAKE ONE TABLET BY MOUTH ONCE DAILY * Notice: This list has 6 medication(s) that are the same as other medications prescribed for you. Read the directions carefully, and ask your doctor or other care provider to review them with you. Prescriptions Sent to Pharmacy Refills  
 ciprofloxacin HCl (CIPRO) 500 mg tablet 0 Sig: Take 1 Tab by mouth two (2) times a day for 5 days. Class: Normal  
 Pharmacy: [facility] [address] FLORECITA YUN [address], [address] Ph #: [phone] Route: Oral  
  
We Performed the Following AMB POC URINALYSIS DIP STICK AUTO W/O MICRO [15948 CPT(R)] Follow-up Instructions Return in about 3 months (around 5/21/2018). Introducing [facility]! Dear Bailey Mcginnis: 
Thank you for requesting a Point account. Our records indicate that you already have an active Point account. You can access your account anytime at [email or web address] Did you know that you can access your hospital and ER discharge instructions at any time in Point? You can also review all of your test results from your hospital stay or ER visit. Additional Information If you have questions, please visit the Frequently Asked Questions section of the Point website at [email or web address] Remember, Point is NOT to be used for urgent needs. For medical emergencies, dial 911. Now available from your iPhone and Android! Please provide this summary of care documentation to your next provider. Your primary care clinician is listed as Moni Agustin. If you have any questions after today's visit, please call [phone].

## 2018-02-26 ENCOUNTER — TELEPHONE (OUTPATIENT)
Dept: FAMILY MEDICINE CLINIC | Age: 68
End: 2018-02-26

## 2018-02-26 DIAGNOSIS — N39.0 URINARY TRACT INFECTION WITHOUT HEMATURIA, SITE UNSPECIFIED: Primary | ICD-10-CM

## 2018-02-26 RX ORDER — SULFAMETHOXAZOLE AND TRIMETHOPRIM 800; 160 MG/1; MG/1
1 TABLET ORAL 2 TIMES DAILY
Qty: 14 TAB | Refills: 0 | Status: SHIPPED | OUTPATIENT
Start: 2018-02-26 | End: 2018-03-05

## 2018-02-26 NOTE — TELEPHONE ENCOUNTER
Pt called in stating that the Cipro did not help her and that she has taken the last of it. Pt wants to know what she can be prescribed to make her feel better.  Please assist.

## 2018-02-27 NOTE — TELEPHONE ENCOUNTER
2 patient identifiers verified. Spoke with Mimi Akers. Patient made aware that new medication has been sent to her pharmacy.   Patient acknowledges understanding and voices no concerns at this time

## 2018-03-02 DIAGNOSIS — N39.41 URGE INCONTINENCE OF URINE: ICD-10-CM

## 2018-03-02 RX ORDER — OXYBUTYNIN CHLORIDE 5 MG/1
TABLET ORAL
Qty: 90 TAB | Refills: 0 | Status: SHIPPED | OUTPATIENT
Start: 2018-03-02 | End: 2018-06-29 | Stop reason: SDUPTHER

## 2018-03-25 DIAGNOSIS — E78.00 HYPERCHOLESTEREMIA: ICD-10-CM

## 2018-03-25 DIAGNOSIS — I10 ESSENTIAL HYPERTENSION: ICD-10-CM

## 2018-03-25 RX ORDER — LOSARTAN POTASSIUM 25 MG/1
TABLET ORAL
Qty: 90 TAB | Refills: 1 | Status: SHIPPED | OUTPATIENT
Start: 2018-03-25 | End: 2018-06-27 | Stop reason: SDUPTHER

## 2018-03-26 RX ORDER — ATORVASTATIN CALCIUM 10 MG/1
10 TABLET, FILM COATED ORAL DAILY
Qty: 90 TAB | Refills: 0 | Status: SHIPPED | OUTPATIENT
Start: 2018-03-26 | End: 2018-07-03 | Stop reason: SDUPTHER

## 2018-03-26 RX ORDER — ATORVASTATIN CALCIUM 10 MG/1
TABLET, FILM COATED ORAL
Qty: 90 TAB | Refills: 1 | OUTPATIENT
Start: 2018-03-26

## 2018-04-16 ENCOUNTER — TELEPHONE (OUTPATIENT)
Dept: CARDIOLOGY CLINIC | Age: 68
End: 2018-04-16

## 2018-04-16 DIAGNOSIS — R42 DIZZINESS: ICD-10-CM

## 2018-04-16 DIAGNOSIS — R55 SYNCOPE, UNSPECIFIED SYNCOPE TYPE: Primary | ICD-10-CM

## 2018-04-26 ENCOUNTER — HOSPITAL ENCOUNTER (OUTPATIENT)
Dept: VASCULAR SURGERY | Age: 68
Discharge: HOME | End: 2018-04-26
Attending: INTERNAL MEDICINE
Payer: MEDICARE

## 2018-04-26 DIAGNOSIS — R42 DIZZINESS: ICD-10-CM

## 2018-04-26 PROCEDURE — 93880 EXTRACRANIAL BILAT STUDY: CPT

## 2018-04-26 NOTE — PROCEDURES
[facility]  *** FINAL REPORT ***    Name: Syeda Warren  MRN: [id number]    Outpatient  : 1950  HIS Order #: [id number]  [address] [facility] Visit #: [id number]  Date: 2018    TYPE OF TEST: Cerebrovascular Duplex    REASON FOR TEST  Syncope    Right Carotid:-             Proximal               Mid                 Distal  cm/s  Systolic  Diastolic  Systolic  Diastolic  Systolic  Diastolic  CCA:    333.4      14.0       94.0      19.0       92.0      17.0  Bulb:  ECA:    118.0      14.0  ICA:     83.0      21.0      104.0      33.0       96.0      29.0  ICA/CCA:  0.9       2.4    ICA Stenosis: Normal    Right Vertebral:-  Finding: Antegrade  Sys:       49.0  Isabela:       14.0    Right Subclavian: Normal    Left Carotid:-            Proximal                Mid                 Distal  cm/s  Systolic  Diastolic  Systolic  Diastolic  Systolic  Diastolic  CCA:     32.0      17.0      101.0      20.0       93.0      21.0  Bulb:  ECA:     81.0       0.0  ICA:     83.0      23.0       74.0      27.0       75.0      26.0  ICA/CCA:  0.8       1.2    ICA Stenosis: <50%    Left Vertebral:-  Finding: Antegrade  Sys:       58.0  Isabela:       16.0    Left Subclavian: Normal    INTERPRETATION/FINDINGS  Duplex images were obtained using 2-D gray scale, color flow, and  spectral Doppler analysis. 1. No significant stenosis of the right internal carotid artery. 2. Mild plaquing of the left internal carotid artery with less than  50% stenosis. 3. No significant stenosis in the external carotid arteries  bilaterally. 4. Antegrade flow in both vertebral arteries. 5. Normal flow in both subclavian arteries. ADDITIONAL COMMENTS    I have personally reviewed the data relevant to the interpretation of  this  study. TECHNOLOGIST: Lani Dumas. Vivian Qiu  Signed: 2018 09:47 AM    PHYSICIAN: Meghan Ledesma.  Kyler Hopkins MD  Signed: 2018 09:19 PM

## 2018-05-07 ENCOUNTER — HOSPITAL ENCOUNTER (OUTPATIENT)
Dept: LAB | Age: 68
Discharge: HOME | End: 2018-05-07

## 2018-05-07 ENCOUNTER — OFFICE VISIT (OUTPATIENT)
Dept: FAMILY MEDICINE CLINIC | Age: 68
End: 2018-05-07

## 2018-05-07 VITALS
BODY MASS INDEX: 57.52 KG/M2 | SYSTOLIC BLOOD PRESSURE: 133 MMHG | RESPIRATION RATE: 18 BRPM | WEIGHT: 293 LBS | TEMPERATURE: 95.8 F | HEART RATE: 100 BPM | DIASTOLIC BLOOD PRESSURE: 73 MMHG | HEIGHT: 60 IN | OXYGEN SATURATION: 96 %

## 2018-05-07 DIAGNOSIS — E66.01 MORBID OBESITY DUE TO EXCESS CALORIES (HCC): ICD-10-CM

## 2018-05-07 DIAGNOSIS — I10 HTN (HYPERTENSION), BENIGN: ICD-10-CM

## 2018-05-07 DIAGNOSIS — M35.00 SJOGREN'S SYNDROME, WITH UNSPECIFIED ORGAN INVOLVEMENT (HCC): ICD-10-CM

## 2018-05-07 DIAGNOSIS — F32.89 OTHER DEPRESSION: ICD-10-CM

## 2018-05-07 DIAGNOSIS — E66.01 MORBID OBESITY (HCC): Primary | ICD-10-CM

## 2018-05-07 PROCEDURE — 99001 SPECIMEN HANDLING PT-LAB: CPT | Performed by: INTERNAL MEDICINE

## 2018-05-07 NOTE — MR AVS SNAPSHOT
[address] [place] [id number] 
[phone] Patient: Thomas Patel MRN: [id number] [facility]:[birth year] Visit Information Date & Time Provider Department Dept. Phone Encounter #  
 5/7/2018  1:45 PM Trini Patrick, [address] [place] [address] [phone] [id number] Follow-up Instructions Return in about 3 months (around 8/7/2018). Upcoming Health Maintenance Date Due  
 GLAUCOMA SCREENING Q2Y 7/9/2017 Influenza Age 5 to Adult 8/1/2018 MEDICARE YEARLY EXAM 8/8/2018 BREAST CANCER SCRN MAMMOGRAM 1/2/2020 COLONOSCOPY 7/25/2023 DTaP/Tdap/Td series (2 - Td) 7/9/2025 Allergies as of 5/7/2018  Review Complete On: 2/21/2018 By: Cindy Edwards LPN Severity Noted Reaction Type Reactions Adhesive  11/24/2015    Other (comments) Skin burns - red spots Lisinopril  07/09/2015    Other (comments) Nitrofurantoin  05/13/2016    Other (comments) Current Immunizations  Never Reviewed Name Date Influenza High Dose Vaccine PF 8/12/2016, 10/19/2015 Influenza Vaccine 9/19/2017 Pneumococcal Conjugate (PCV-13) 8/7/2017 Pneumococcal Polysaccharide (PPSV-23) 7/26/2016  8:30 AM, 7/9/2015  9:00 AM  
 Tdap 7/9/2015  9:00 AM  
  
 Not reviewed this visit You Were Diagnosed With   
  
 Codes Comments Morbid obesity ([facility]    -  Primary ICD-10-CM: E66.01 
ICD-9-CM: 278.01 Morbid obesity due to excess calories (HCC)     ICD-10-CM: E66.01 
ICD-9-CM: 278.01 Sjogren's syndrome, with unspecified organ involvement ([facility]     ICD-10-CM: M35.00 ICD-9-CM: 710.2 HTN (hypertension), benign     ICD-10-CM: I10 
ICD-9-CM: 401.1 Other depression     ICD-10-CM: F32.89 ICD-9-CM: 611 Vitals BP Pulse Temp Resp Height(growth percentile) Weight(growth percentile)  133/73 (BP 1 Location: Right arm, BP Patient Position: At rest) 100 95.8 °F (35.4 °C) (Oral) 18 5' (1.524 m) 294 lb (133.4 kg) SpO2 BMI OB Status Smoking Status 96% 57.42 kg/m2 Hysterectomy Never Smoker Vitals History BMI and BSA Data Body Mass Index Body Surface Area  
 57.42 kg/m 2 2.38 m 2 Preferred Pharmacy Pharmacy Name Phone [address], [address] [phone] Your Updated Medication List  
  
   
This list is accurate as of 5/7/18  3:47 PM.  Always use your most recent med list.  
  
  
  
  
 acetaminophen-codeine 300-30 mg per tablet Commonly known as:  TYLENOL-CODEINE #3 Take 1 Tab by mouth every six (6) hours as needed for Pain. Max Daily Amount: 4 Tabs. albuterol 90 mcg/actuation inhaler Commonly known as:  PROVENTIL HFA, VENTOLIN HFA, PROAIR HFA Take 2 Puffs by inhalation every six (6) hours as needed for Wheezing. alendronate 70 mg tablet Commonly known as:  FOSAMAX Take 1 Tab by mouth every seven (7) days. atorvastatin 10 mg tablet Commonly known as:  LIPITOR Take 1 Tab by mouth daily. clotrimazole 1 % topical cream  
Commonly known as:  Vallejo Beau Apply  to affected area two (2) times a day. DULoxetine 60 mg capsule Commonly known as:  CYMBALTA TAKE ONE CAPSULE BY MOUTH ONCE DAILY  
  
 ergocalciferol 50,000 unit capsule Commonly known as:  ERGOCALCIFEROL Take 1 Cap by mouth every seven (7) days. * furosemide 40 mg tablet Commonly known as:  LASIX One po daily * furosemide 20 mg tablet Commonly known as:  LASIX TAKE ONE TABLET BY MOUTH ONCE DAILY  
  
 hydrocortisone 1 % ointment Commonly known as:  HYCORT Apply  to affected area two (2) times a day. use thin layer * losartan 25 mg tablet Commonly known as:  COZAAR  
TAKE ONE TABLET BY MOUTH ONCE DAILY  
  
 * losartan 25 mg tablet Commonly known as:  COZAAR  
TAKE ONE TABLET BY MOUTH ONCE DAILY * metFORMIN 1,000 mg tablet Commonly known as:  GLUCOPHAGE Take 1 Tab by mouth two (2) times daily (with meals). * metFORMIN 500 mg tablet Commonly known as:  GLUCOPHAGE  
TAKE ONE TABLET BY MOUTH TWICE DAILY WITH MEALS  
  
 metroNIDAZOLE 1 % topical cream  
Commonly known as:  Rich Smith Apply  to affected area two (2) times daily (with meals). Use a thin layer to affected areas after washing Miconazole Powd Apply twice daily on the left leg  
  
 oxybutynin 5 mg tablet Commonly known as:  DITROPAN  
TAKE ONE TABLET BY MOUTH ONCE DAILY pantoprazole 40 mg tablet Commonly known as:  PROTONIX  
TAKE ONE TABLET BY MOUTH ONCE DAILY potassium chloride SR 10 mEq tablet Commonly known as:  KLOR-CON 10  
TAKE ONE TABLET BY MOUTH ONCE DAILY * Notice: This list has 6 medication(s) that are the same as other medications prescribed for you. Read the directions carefully, and ask your doctor or other care provider to review them with you. Follow-up Instructions Return in about 3 months (around 8/7/2018). To-Do List   
 05/07/2018 Lab:  CBC WITH AUTOMATED DIFF   
  
 05/07/2018 Lab:  LIPID PANEL   
  
 05/07/2018 Lab:  METABOLIC PANEL, COMPREHENSIVE [facility]! Dear Negrita Mauricio: 
Thank you for requesting a Musicplayr account. Our records indicate that you already have an active Musicplayr account. You can access your account anytime at [email or web address] Did you know that you can access your hospital and ER discharge instructions at any time in Musicplayr? You can also review all of your test results from your hospital stay or ER visit. Additional Information If you have questions, please visit the Frequently Asked Questions section of the Musicplayr website at [email or web address] Remember, Musicplayr is NOT to be used for urgent needs. For medical emergencies, dial 911. Now available from your iPhone and Android! Please provide this summary of care documentation to your next provider. Your primary care clinician is listed as Edel Saha. If you have any questions after today's visit, please call [phone].

## 2018-05-07 NOTE — PROGRESS NOTES
Jairo Vidales is a 76 y.o.  female and presents with     Chief Complaint   Patient presents with    Dizziness    Hypertension    Cholesterol Problem       Pt is here for follow up on labs. Pt is taking her blood pressure meds and chol meds. Pt says dizziness has resolved. Past Medical History:   Diagnosis Date    Degenerative disk disease     Depression     Depression     Diffuse idiopathic skeletal hyperostosis     Fracture of leg     Gallstone     Lymphedema of leg 1975    bilateral lower legs    Morbid obesity ([address]     Pulmonary arterial hypertension (HCC)     MANPREET (stress urinary incontinence, female)      Past Surgical History:   Procedure Laterality Date    HX HYSTERECTOMY      HX KNEE REPLACEMENT      HX ORTHOPAEDIC      right shoulder and right knee replaced x 2, left knee replaced as well     Current Outpatient Prescriptions   Medication Sig    atorvastatin (LIPITOR) 10 mg tablet Take 1 Tab by mouth daily.  losartan (COZAAR) 25 mg tablet TAKE ONE TABLET BY MOUTH ONCE DAILY    oxybutynin (DITROPAN) 5 mg tablet TAKE ONE TABLET BY MOUTH ONCE DAILY    potassium chloride SR (KLOR-CON 10) 10 mEq tablet TAKE ONE TABLET BY MOUTH ONCE DAILY    pantoprazole (PROTONIX) 40 mg tablet TAKE ONE TABLET BY MOUTH ONCE DAILY    DULoxetine (CYMBALTA) 60 mg capsule TAKE ONE CAPSULE BY MOUTH ONCE DAILY    clotrimazole (LOTRIMIN) 1 % topical cream Apply  to affected area two (2) times a day.  Miconazole powd Apply twice daily on the left leg    hydrocortisone (HYCORT) 1 % ointment Apply  to affected area two (2) times a day. use thin layer    alendronate (FOSAMAX) 70 mg tablet Take 1 Tab by mouth every seven (7) days.  furosemide (LASIX) 20 mg tablet TAKE ONE TABLET BY MOUTH ONCE DAILY    metroNIDAZOLE (NORITATE) 1 % topical cream Apply  to affected area two (2) times daily (with meals).  Use a thin layer to affected areas after washing    metFORMIN (GLUCOPHAGE) 500 mg tablet TAKE ONE TABLET BY MOUTH TWICE DAILY WITH MEALS    losartan (COZAAR) 25 mg tablet TAKE ONE TABLET BY MOUTH ONCE DAILY    furosemide (LASIX) 40 mg tablet One po daily    acetaminophen-codeine (TYLENOL-CODEINE #3) 300-30 mg per tablet Take 1 Tab by mouth every six (6) hours as needed for Pain. Max Daily Amount: 4 Tabs.  ergocalciferol (ERGOCALCIFEROL) 50,000 unit capsule Take 1 Cap by mouth every seven (7) days.  metFORMIN (GLUCOPHAGE) 1,000 mg tablet Take 1 Tab by mouth two (2) times daily (with meals).  albuterol (PROVENTIL HFA, VENTOLIN HFA, PROAIR HFA) 90 mcg/actuation inhaler Take 2 Puffs by inhalation every six (6) hours as needed for Wheezing. No current facility-administered medications for this visit. Health Maintenance   Topic Date Due    GLAUCOMA SCREENING Q2Y  07/09/2017    Influenza Age 5 to Adult  08/01/2018    MEDICARE YEARLY EXAM  08/08/2018    BREAST CANCER SCRN MAMMOGRAM  01/02/2020    COLONOSCOPY  07/25/2023    DTaP/Tdap/Td series (2 - Td) 07/09/2025    Hepatitis C Screening  Completed    Bone Densitometry (Dexa) Screening  Completed    ZOSTER VACCINE AGE 60>  Completed    Pneumococcal 65+ Low/Medium Risk  Completed     Immunization History   Administered Date(s) Administered    Influenza High Dose Vaccine PF 10/19/2015, 08/12/2016    Influenza Vaccine 09/19/2017    Pneumococcal Conjugate (PCV-13) 08/07/2017    Pneumococcal Polysaccharide (PPSV-23) 07/09/2015, 07/26/2016    Tdap 07/09/2015     No LMP recorded. Patient has had a hysterectomy. Allergies and Intolerances: Allergies   Allergen Reactions    Adhesive Other (comments)     Skin burns - red spots    Lisinopril Other (comments)    Nitrofurantoin Other (comments)       Family History:   Family History   Problem Relation Age of Onset    Heart Disease Father     Diabetes Father        Social History:   She  reports that she has never smoked.  She has never used smokeless tobacco.  She  reports that she does not drink alcohol. Review of Systems:   General: negative for - chills, fatigue, fever, weight change  Psych: negative for - anxiety, depression, irritability or mood swings  ENT: negative for - headaches, hearing change, nasal congestion, oral lesions, sneezing or sore throat  Heme/ Lymph: negative for - bleeding problems, bruising, pallor or swollen lymph nodes  Endo: negative for - hot flashes, polydipsia/polyuria or temperature intolerance  Resp: negative for - cough, shortness of breath or wheezing  CV: negative for - chest pain, edema or palpitations  GI: negative for - abdominal pain, change in bowel habits, constipation, diarrhea or nausea/vomiting  : negative for - dysuria, hematuria, incontinence, pelvic pain or vulvar/vaginal symptoms  MSK: negative for - joint pain, joint swelling or muscle pain  Neuro: negative for - confusion, headaches, seizures or weakness  Derm: negative for - dry skin, hair changes, rash or skin lesion changes          Physical:   Vitals:   Vitals:    05/07/18 1510   BP: 133/73   Pulse: 100   Resp: 18   Temp: 95.8 °F (35.4 °C)   TempSrc: Oral   SpO2: 96%   Weight: 294 lb (133.4 kg)   Height: 5' (1.524 m)           Exam:   HEENT- atraumatic,normocephalic, awake, oriented, well nourished  Neck - supple,no enlarged lymph nodes, no JVD, no thyromegaly  Chest- CTA, no rhonchi, no crackles  Heart- rrr, no murmurs / gallop/rub  Abdomen- soft,BS+,NT, no hepatosplenomegaly  Ext - no c/c/edema   Neuro- no focal deficits. Power 5/5 all extremities  Skin - warm,dry, no obvious rashes.           Review of Data:   LABS:   Lab Results   Component Value Date/Time    WBC 8.4 11/07/2017 03:46 AM    HGB 11.9 11/07/2017 03:46 AM    HCT 35.6 11/07/2017 03:46 AM    PLATELET 027 13/58/7525 03:46 AM     Lab Results   Component Value Date/Time    Sodium 142 11/07/2017 03:46 AM    Potassium 3.9 11/07/2017 03:46 AM    Chloride 102 11/07/2017 03:46 AM    CO2 24 11/07/2017 03:46 AM Glucose 103 (H) 11/07/2017 03:46 AM    BUN 9 11/07/2017 03:46 AM    Creatinine 0.76 11/07/2017 03:46 AM     Lab Results   Component Value Date/Time    Cholesterol, total 132 10/20/2017 03:50 AM    HDL Cholesterol 72 (H) 10/20/2017 03:50 AM    LDL, calculated 41.4 10/20/2017 03:50 AM    Triglyceride 93 10/20/2017 03:50 AM     No results found for: GPT        Impression / Plan:        ICD-10-CM ICD-9-CM    1. Morbid obesity ([facility] E66.01 278.01 CBC WITH AUTOMATED DIFF      METABOLIC PANEL, COMPREHENSIVE      LIPID PANEL   2. Morbid obesity due to excess calories (HCC) E66.01 278.01    3. Sjogren's syndrome, with unspecified organ involvement ([facility] M35.00 710.2    4. HTN (hypertension), benign I10 401.1    5. Other depression F32.89 311          Dizziness - resolved, carotid duplex - normal.    Explained to patient risk benefits of the medications. Advised patient to stop meds if having any side effects. Pt verbalized understanding of the instructions. I have discussed the diagnosis with the patient and the intended plan as seen in the above orders. The patient has received an after-visit summary and questions were answered concerning future plans. I have discussed medication side effects and warnings with the patient as well. I have reviewed the plan of care with the patient, accepted their input and they are in agreement with the treatment goals. Reviewed plan of care. Patient has provided input and agrees with goals.     Follow-up Disposition: Not on Yann Gillis MD

## 2018-05-07 NOTE — PROGRESS NOTES
Chief Complaint   Patient presents with    Dizziness     1. Have you been to the ER, urgent care clinic since your last visit? Hospitalized since your last visit? No    2. Have you seen or consulted any other health care providers outside of the [address] [place] [address] since your last visit? Include any pap smears or colon screening.  No

## 2018-05-08 LAB
ALBUMIN SERPL-MCNC: 3.9 G/DL (ref 3.6–4.8)
ALBUMIN/GLOB SERPL: 1.4 {RATIO} (ref 1.2–2.2)
ALP SERPL-CCNC: 75 IU/L (ref 39–117)
ALT SERPL-CCNC: 9 IU/L (ref 0–32)
AST SERPL-CCNC: 16 IU/L (ref 0–40)
BASOPHILS # BLD AUTO: 0 X10E3/UL (ref 0–0.2)
BASOPHILS NFR BLD AUTO: 1 %
BILIRUB SERPL-MCNC: 1.4 MG/DL (ref 0–1.2)
BUN SERPL-MCNC: 11 MG/DL (ref 8–27)
BUN/CREAT SERPL: 15 (ref 12–28)
CALCIUM SERPL-MCNC: 9.5 MG/DL (ref 8.7–10.3)
CHLORIDE SERPL-SCNC: 103 MMOL/L (ref 96–106)
CHOLEST SERPL-MCNC: 166 MG/DL (ref 100–199)
CO2 SERPL-SCNC: 27 MMOL/L (ref 18–29)
CREAT SERPL-MCNC: 0.71 MG/DL (ref 0.57–1)
EOSINOPHIL # BLD AUTO: 0.2 X10E3/UL (ref 0–0.4)
EOSINOPHIL NFR BLD AUTO: 3 %
ERYTHROCYTE [DISTWIDTH] IN BLOOD BY AUTOMATED COUNT: 14.3 % (ref 12.3–15.4)
GFR SERPLBLD CREATININE-BSD FMLA CKD-EPI: 101 ML/MIN/1.73
GFR SERPLBLD CREATININE-BSD FMLA CKD-EPI: 88 ML/MIN/1.73
GLOBULIN SER CALC-MCNC: 2.7 G/DL (ref 1.5–4.5)
GLUCOSE SERPL-MCNC: 106 MG/DL (ref 65–99)
HCT VFR BLD AUTO: 39.4 % (ref 34–46.6)
HDLC SERPL-MCNC: 73 MG/DL
HGB BLD-MCNC: 13 G/DL (ref 11.1–15.9)
IMM GRANULOCYTES # BLD: 0 X10E3/UL (ref 0–0.1)
IMM GRANULOCYTES NFR BLD: 0 %
INTERPRETATION, 910389: NORMAL
LDLC SERPL CALC-MCNC: 71 MG/DL (ref 0–99)
LYMPHOCYTES # BLD AUTO: 1.7 X10E3/UL (ref 0.7–3.1)
LYMPHOCYTES NFR BLD AUTO: 28 %
MCH RBC QN AUTO: 29.7 PG (ref 26.6–33)
MCHC RBC AUTO-ENTMCNC: 33 G/DL (ref 31.5–35.7)
MCV RBC AUTO: 90 FL (ref 79–97)
MONOCYTES # BLD AUTO: 0.6 X10E3/UL (ref 0.1–0.9)
MONOCYTES NFR BLD AUTO: 10 %
NEUTROPHILS # BLD AUTO: 3.5 X10E3/UL (ref 1.4–7)
NEUTROPHILS NFR BLD AUTO: 58 %
PLATELET # BLD AUTO: 228 X10E3/UL (ref 150–379)
POTASSIUM SERPL-SCNC: 4 MMOL/L (ref 3.5–5.2)
PROT SERPL-MCNC: 6.6 G/DL (ref 6–8.5)
RBC # BLD AUTO: 4.38 X10E6/UL (ref 3.77–5.28)
SODIUM SERPL-SCNC: 145 MMOL/L (ref 134–144)
TRIGL SERPL-MCNC: 109 MG/DL (ref 0–149)
VLDLC SERPL CALC-MCNC: 22 MG/DL (ref 5–40)
WBC # BLD AUTO: 6 X10E3/UL (ref 3.4–10.8)

## 2018-05-14 ENCOUNTER — OFFICE VISIT (OUTPATIENT)
Dept: FAMILY MEDICINE CLINIC | Age: 68
End: 2018-05-14

## 2018-05-14 ENCOUNTER — HOSPITAL ENCOUNTER (OUTPATIENT)
Dept: LAB | Age: 68
Discharge: HOME | End: 2018-05-14

## 2018-05-14 VITALS
TEMPERATURE: 98 F | HEART RATE: 80 BPM | HEIGHT: 60 IN | OXYGEN SATURATION: 98 % | RESPIRATION RATE: 18 BRPM | WEIGHT: 293 LBS | SYSTOLIC BLOOD PRESSURE: 137 MMHG | BODY MASS INDEX: 57.52 KG/M2 | DIASTOLIC BLOOD PRESSURE: 73 MMHG

## 2018-05-14 DIAGNOSIS — L03.012 CELLULITIS OF FINGER OF LEFT HAND: ICD-10-CM

## 2018-05-14 DIAGNOSIS — L03.012 PARONYCHIA OF LEFT MIDDLE FINGER: Primary | ICD-10-CM

## 2018-05-14 PROCEDURE — 99001 SPECIMEN HANDLING PT-LAB: CPT | Performed by: INTERNAL MEDICINE

## 2018-05-14 RX ORDER — CEFTRIAXONE 1 G/1
1 INJECTION, POWDER, FOR SOLUTION INTRAMUSCULAR; INTRAVENOUS ONCE
Qty: 1 VIAL | Refills: 0
Start: 2018-05-14 | End: 2018-05-14

## 2018-05-14 RX ORDER — SULFAMETHOXAZOLE AND TRIMETHOPRIM 800; 160 MG/1; MG/1
1 TABLET ORAL 2 TIMES DAILY
Qty: 14 TAB | Refills: 0 | Status: SHIPPED | OUTPATIENT
Start: 2018-05-14 | End: 2018-05-21

## 2018-05-14 NOTE — PROGRESS NOTES
1. Have you been to the ER, urgent care clinic since your last visit? Hospitalized since your last visit? No    2. Have you seen or consulted any other health care providers outside of the [address] [place] [address] since your last visit? Include any pap smears or colon screening.  No

## 2018-05-14 NOTE — PROGRESS NOTES
Adela Mccray is a 76 y.o.  female and presents with     Chief Complaint   Patient presents with    Finger Swelling       Pt  noticed  Swelling of the left middle finger. Pt had some pus coming out from the side of the finger nail. Pt does have habit of biting her finger nails. Past Medical History:   Diagnosis Date    Degenerative disk disease     Depression     Depression     Diffuse idiopathic skeletal hyperostosis     Fracture of leg     Gallstone     Lymphedema of leg 1975    bilateral lower legs    Morbid obesity ([address]     Pulmonary arterial hypertension (HCC)     MANPREET (stress urinary incontinence, female)      Past Surgical History:   Procedure Laterality Date    HX HYSTERECTOMY      HX KNEE REPLACEMENT      HX ORTHOPAEDIC      right shoulder and right knee replaced x 2, left knee replaced as well     Current Outpatient Prescriptions   Medication Sig    trimethoprim-sulfamethoxazole (BACTRIM DS, SEPTRA DS) 160-800 mg per tablet Take 1 Tab by mouth two (2) times a day for 7 days.  cefTRIAXone (ROCEPHIN) 1 gram injection 1 g by IntraMUSCular route once for 1 dose.  atorvastatin (LIPITOR) 10 mg tablet Take 1 Tab by mouth daily.  losartan (COZAAR) 25 mg tablet TAKE ONE TABLET BY MOUTH ONCE DAILY    oxybutynin (DITROPAN) 5 mg tablet TAKE ONE TABLET BY MOUTH ONCE DAILY    potassium chloride SR (KLOR-CON 10) 10 mEq tablet TAKE ONE TABLET BY MOUTH ONCE DAILY    pantoprazole (PROTONIX) 40 mg tablet TAKE ONE TABLET BY MOUTH ONCE DAILY    DULoxetine (CYMBALTA) 60 mg capsule TAKE ONE CAPSULE BY MOUTH ONCE DAILY    clotrimazole (LOTRIMIN) 1 % topical cream Apply  to affected area two (2) times a day.  Miconazole powd Apply twice daily on the left leg    hydrocortisone (HYCORT) 1 % ointment Apply  to affected area two (2) times a day. use thin layer    alendronate (FOSAMAX) 70 mg tablet Take 1 Tab by mouth every seven (7) days.     furosemide (LASIX) 20 mg tablet TAKE ONE TABLET BY MOUTH ONCE DAILY    metroNIDAZOLE (NORITATE) 1 % topical cream Apply  to affected area two (2) times daily (with meals). Use a thin layer to affected areas after washing    metFORMIN (GLUCOPHAGE) 500 mg tablet TAKE ONE TABLET BY MOUTH TWICE DAILY WITH MEALS    losartan (COZAAR) 25 mg tablet TAKE ONE TABLET BY MOUTH ONCE DAILY    furosemide (LASIX) 40 mg tablet One po daily    acetaminophen-codeine (TYLENOL-CODEINE #3) 300-30 mg per tablet Take 1 Tab by mouth every six (6) hours as needed for Pain. Max Daily Amount: 4 Tabs.  ergocalciferol (ERGOCALCIFEROL) 50,000 unit capsule Take 1 Cap by mouth every seven (7) days.  metFORMIN (GLUCOPHAGE) 1,000 mg tablet Take 1 Tab by mouth two (2) times daily (with meals).  albuterol (PROVENTIL HFA, VENTOLIN HFA, PROAIR HFA) 90 mcg/actuation inhaler Take 2 Puffs by inhalation every six (6) hours as needed for Wheezing. No current facility-administered medications for this visit. Health Maintenance   Topic Date Due    GLAUCOMA SCREENING Q2Y  07/09/2017    Influenza Age 5 to Adult  08/01/2018    MEDICARE YEARLY EXAM  08/08/2018    BREAST CANCER SCRN MAMMOGRAM  01/02/2020    COLONOSCOPY  07/25/2023    DTaP/Tdap/Td series (2 - Td) 07/09/2025    Hepatitis C Screening  Completed    Bone Densitometry (Dexa) Screening  Completed    ZOSTER VACCINE AGE 60>  Completed    Pneumococcal 65+ Low/Medium Risk  Completed     Immunization History   Administered Date(s) Administered    Influenza High Dose Vaccine PF 10/19/2015, 08/12/2016    Influenza Vaccine 09/19/2017    Pneumococcal Conjugate (PCV-13) 08/07/2017    Pneumococcal Polysaccharide (PPSV-23) 07/09/2015, 07/26/2016    Tdap 07/09/2015     No LMP recorded. Patient has had a hysterectomy. Allergies and Intolerances:    Allergies   Allergen Reactions    Adhesive Other (comments)     Skin burns - red spots    Lisinopril Other (comments)    Nitrofurantoin Other (comments)       Family History:   Family History   Problem Relation Age of Onset    Heart Disease Father     Diabetes Father        Social History:   She  reports that she has never smoked. She has never used smokeless tobacco.  She  reports that she does not drink alcohol. Review of Systems:   General: negative for - chills, fatigue, fever, weight change  Psych: negative for - anxiety, depression, irritability or mood swings  ENT: negative for - headaches, hearing change, nasal congestion, oral lesions, sneezing or sore throat  Heme/ Lymph: negative for - bleeding problems, bruising, pallor or swollen lymph nodes  Endo: negative for - hot flashes, polydipsia/polyuria or temperature intolerance  Resp: negative for - cough, shortness of breath or wheezing  CV: negative for - chest pain, edema or palpitations  GI: negative for - abdominal pain, change in bowel habits, constipation, diarrhea or nausea/vomiting  : negative for - dysuria, hematuria, incontinence, pelvic pain or vulvar/vaginal symptoms  MSK: negative for - joint pain, joint swelling or muscle pain  Neuro: negative for - confusion, headaches, seizures or weakness  Derm: negative for - dry skin, hair changes, rash or skin lesion changes,pos for swelling  Of the left middle finger. Physical:   Vitals:   Vitals:    05/14/18 1314   BP: 137/73   Pulse: 80   Resp: 18   Temp: 98 °F (36.7 °C)   TempSrc: Oral   SpO2: 98%   Weight: 296 lb (134.3 kg)   Height: 5' (1.524 m)           Exam:   HEENT- atraumatic,normocephalic, awake, oriented, well nourished  Neck - supple,no enlarged lymph nodes, no JVD, no thyromegaly  Chest- CTA, no rhonchi, no crackles  Heart- rrr, no murmurs / gallop/rub  Abdomen- soft,BS+,NT, no hepatosplenomegaly  Ext - no c/c/edema , bacterial paronchia + celluliitis of the the pulp of the left middle finger. Neuro- no focal deficits. Power 5/5 all extremities  Skin - warm,dry, no obvious rashes.           Review of Data:   LABS:   Lab Results Component Value Date/Time    WBC 6.0 05/07/2018 04:06 AM    HGB 13.0 05/07/2018 04:06 AM    HCT 39.4 05/07/2018 04:06 AM    PLATELET 332 42/02/6192 04:06 AM     Lab Results   Component Value Date/Time    Sodium 145 (H) 05/07/2018 04:06 AM    Potassium 4.0 05/07/2018 04:06 AM    Chloride 103 05/07/2018 04:06 AM    CO2 27 05/07/2018 04:06 AM    Glucose 106 (H) 05/07/2018 04:06 AM    BUN 11 05/07/2018 04:06 AM    Creatinine 0.71 05/07/2018 04:06 AM     Lab Results   Component Value Date/Time    Cholesterol, total 166 05/07/2018 04:06 AM    HDL Cholesterol 73 05/07/2018 04:06 AM    LDL, calculated 71 05/07/2018 04:06 AM    Triglyceride 109 05/07/2018 04:06 AM     No results found for: GPT        Impression / Plan:        ICD-10-CM ICD-9-CM    1. Paronychia of left middle finger L03.012 681.02 trimethoprim-sulfamethoxazole (BACTRIM DS, SEPTRA DS) 160-800 mg per tablet   2. Cellulitis of finger of left hand L03.012 681.00 cefTRIAXone (ROCEPHIN) 1 gram injection      CEFTRIAXONE SODIUM INJECTION  MG      ND THER/PROPH/DIAG INJECTION, SUBCUT/IM      CULTURE, WOUND W GRAM STAIN     Asked pt to apply bactroban ointment to the dorsum of the finger,      Explained to patient risk benefits of the medications. Advised patient to stop meds if having any side effects. Pt verbalized understanding of the instructions. I have discussed the diagnosis with the patient and the intended plan as seen in the above orders. The patient has received an after-visit summary and questions were answered concerning future plans. I have discussed medication side effects and warnings with the patient as well. I have reviewed the plan of care with the patient, accepted their input and they are in agreement with the treatment goals. Reviewed plan of care. Patient has provided input and agrees with goals. Follow-up Disposition:  Return in about 3 months (around 8/14/2018).     [id number] Abernathy MD

## 2018-05-14 NOTE — MR AVS SNAPSHOT
[address] [place] [address] [id number] 
[phone] Patient: Steve Jefferson MRN: [id number] [facility]: Visit Information Date & Time Provider Department Dept. Phone Encounter #  
 2018  1:00 PM [id number]nathy, [facility] [phone] Follow-up Instructions Return in about 3 months (around 2018). Upcoming Health Maintenance Date Due  
 GLAUCOMA SCREENING Q2Y 2017 Influenza Age 5 to Adult 2018 MEDICARE YEARLY EXAM 2018 BREAST CANCER SCRN MAMMOGRAM 2020 COLONOSCOPY 2023 DTaP/Tdap/Td series (2 - Td) 2025 Allergies as of 2018  Review Complete On: 2018 By: Malissa Hernandez LPN Severity Noted Reaction Type Reactions Adhesive  2015    Other (comments) Skin burns - red spots Lisinopril  2015    Other (comments) Nitrofurantoin  2016    Other (comments) Current Immunizations  Never Reviewed Name Date Influenza High Dose Vaccine PF 2016, 10/19/2015 Influenza Vaccine 2017 Pneumococcal Conjugate (PCV-13) 2017 Pneumococcal Polysaccharide (PPSV-23) 2016  8:30 AM, 2015  9:00 AM  
 Tdap 2015  9:00 AM  
  
 Not reviewed this visit You Were Diagnosed With   
  
 Codes Comments Paronychia of left middle finger    -  Primary ICD-10-CM: L03.012 
ICD-9-CM: 681.02 Cellulitis of finger of left hand     ICD-10-CM: L03.012 
ICD-9-CM: 681.00 Vitals BP Pulse Temp Resp Height(growth percentile) Weight(growth percentile)  
 137/73 80 98 °F (36.7 °C) (Oral) 18 5' (1.524 m) 296 lb (134.3 kg) SpO2 BMI OB Status Smoking Status 98% 57.81 kg/m2 Hysterectomy Never Smoker Vitals History BMI and BSA Data Body Mass Index Body Surface Area  
 57.81 kg/m 2 2.38 m 2 Preferred Pharmacy Pharmacy Name Phone [address], [address] [phone] Your Updated Medication List  
  
   
This list is accurate as of 5/14/18  1:37 PM.  Always use your most recent med list.  
  
  
  
  
 acetaminophen-codeine 300-30 mg per tablet Commonly known as:  TYLENOL-CODEINE #3 Take 1 Tab by mouth every six (6) hours as needed for Pain. Max Daily Amount: 4 Tabs. albuterol 90 mcg/actuation inhaler Commonly known as:  PROVENTIL HFA, VENTOLIN HFA, PROAIR HFA Take 2 Puffs by inhalation every six (6) hours as needed for Wheezing. alendronate 70 mg tablet Commonly known as:  FOSAMAX Take 1 Tab by mouth every seven (7) days. atorvastatin 10 mg tablet Commonly known as:  LIPITOR Take 1 Tab by mouth daily. cefTRIAXone 1 gram injection Commonly known as:  ROCEPHIN  
1 g by IntraMUSCular route once for 1 dose. clotrimazole 1 % topical cream  
Commonly known as:  Rito Goodie Apply  to affected area two (2) times a day. DULoxetine 60 mg capsule Commonly known as:  CYMBALTA TAKE ONE CAPSULE BY MOUTH ONCE DAILY  
  
 ergocalciferol 50,000 unit capsule Commonly known as:  ERGOCALCIFEROL Take 1 Cap by mouth every seven (7) days. * furosemide 40 mg tablet Commonly known as:  LASIX One po daily * furosemide 20 mg tablet Commonly known as:  LASIX TAKE ONE TABLET BY MOUTH ONCE DAILY  
  
 hydrocortisone 1 % ointment Commonly known as:  HYCORT Apply  to affected area two (2) times a day. use thin layer * losartan 25 mg tablet Commonly known as:  COZAAR  
TAKE ONE TABLET BY MOUTH ONCE DAILY  
  
 * losartan 25 mg tablet Commonly known as:  COZAAR  
TAKE ONE TABLET BY MOUTH ONCE DAILY * metFORMIN 1,000 mg tablet Commonly known as:  GLUCOPHAGE Take 1 Tab by mouth two (2) times daily (with meals). * metFORMIN 500 mg tablet Commonly known as:  GLUCOPHAGE  
TAKE ONE TABLET BY MOUTH TWICE DAILY WITH MEALS  
  
 metroNIDAZOLE 1 % topical cream  
Commonly known as:  [place] Apply  to affected area two (2) times daily (with meals). Use a thin layer to affected areas after washing Miconazole Powd Apply twice daily on the left leg  
  
 oxybutynin 5 mg tablet Commonly known as:  DITROPAN  
TAKE ONE TABLET BY MOUTH ONCE DAILY pantoprazole 40 mg tablet Commonly known as:  PROTONIX  
TAKE ONE TABLET BY MOUTH ONCE DAILY potassium chloride SR 10 mEq tablet Commonly known as:  KLOR-CON 10  
TAKE ONE TABLET BY MOUTH ONCE DAILY  
  
 trimethoprim-sulfamethoxazole 160-800 mg per tablet Commonly known as:  BACTRIM DS, SEPTRA DS Take 1 Tab by mouth two (2) times a day for 7 days. * Notice: This list has 6 medication(s) that are the same as other medications prescribed for you. Read the directions carefully, and ask your doctor or other care provider to review them with you. Prescriptions Sent to Pharmacy Refills  
 trimethoprim-sulfamethoxazole (BACTRIM DS, SEPTRA DS) 160-800 mg per tablet 0 Sig: Take 1 Tab by mouth two (2) times a day for 7 days. Class: Normal  
 Pharmacy: [facility] [address] FLORECITA YUN [address], [address] Ph #: [phone] Route: Oral  
  
We Performed the Following CEFTRIAXONE SODIUM INJECTION  MG [ [facility]] Comments:  
 Mix per protocol [place] THER/PROPH/DIAG INJECTION, SUBCUT/IM [id number] CPT(R)] Follow-up Instructions Return in about 3 months (around 8/14/2018). To-Do List   
 05/14/2018 Microbiology:  CULTURE, WOUND W GRAM STAIN Introducing [facility]! Dear John Roth: 
Thank you for requesting a [facility] account. Our records indicate that you already have an active [facility] account. You can access your account anytime at [email or web address] Did you know that you can access your hospital and ER discharge instructions at any time in [facility]?   You can also review all of your test results from your hospital stay or ER visit. Additional Information If you have questions, please visit the Frequently Asked Questions section of the [employer] website at [email or web address] Remember, [facility] is NOT to be used for urgent needs. For medical emergencies, dial 911. Now available from your iPhone and Android! Please provide this summary of care documentation to your next provider. Your primary care clinician is listed as [facility]. If you have any questions after today's visit, please call [phone].

## 2018-05-17 LAB
BACTERIA SPEC AEROBE CULT: ABNORMAL
BACTERIA SPEC AEROBE CULT: ABNORMAL
GRAM STN SPEC: NORMAL
GRAM STN SPEC: NORMAL

## 2018-06-27 ENCOUNTER — OFFICE VISIT (OUTPATIENT)
Dept: FAMILY MEDICINE CLINIC | Age: 68
End: 2018-06-27

## 2018-06-27 ENCOUNTER — HOSPITAL ENCOUNTER (OUTPATIENT)
Dept: LAB | Age: 68
Discharge: HOME | End: 2018-06-27
Payer: MEDICARE

## 2018-06-27 VITALS
SYSTOLIC BLOOD PRESSURE: 125 MMHG | OXYGEN SATURATION: 98 % | RESPIRATION RATE: 16 BRPM | TEMPERATURE: 98.3 F | HEIGHT: 60 IN | HEART RATE: 66 BPM | WEIGHT: 289 LBS | DIASTOLIC BLOOD PRESSURE: 72 MMHG | BODY MASS INDEX: 56.74 KG/M2

## 2018-06-27 DIAGNOSIS — R30.9 PAINFUL URINATION: ICD-10-CM

## 2018-06-27 DIAGNOSIS — B35.6 TINEA CRURIS: ICD-10-CM

## 2018-06-27 DIAGNOSIS — N39.0 URINARY TRACT INFECTION WITHOUT HEMATURIA, SITE UNSPECIFIED: Primary | ICD-10-CM

## 2018-06-27 DIAGNOSIS — M54.9 OTHER CHRONIC BACK PAIN: ICD-10-CM

## 2018-06-27 DIAGNOSIS — M79.89 LEG SWELLING: ICD-10-CM

## 2018-06-27 DIAGNOSIS — G89.29 OTHER CHRONIC BACK PAIN: ICD-10-CM

## 2018-06-27 DIAGNOSIS — N39.0 URINARY TRACT INFECTION WITHOUT HEMATURIA, SITE UNSPECIFIED: ICD-10-CM

## 2018-06-27 LAB
BILIRUB UR QL STRIP: NORMAL
GLUCOSE UR-MCNC: NEGATIVE MG/DL
KETONES P FAST UR STRIP-MCNC: NEGATIVE MG/DL
PH UR STRIP: 5.5 [PH] (ref 4.6–8)
PROT UR QL STRIP: NORMAL
SP GR UR STRIP: 1.03 (ref 1–1.03)
UA UROBILINOGEN AMB POC: NORMAL (ref 0.2–1)
URINALYSIS CLARITY POC: NORMAL
URINALYSIS COLOR POC: NORMAL
URINE BLOOD POC: NORMAL
URINE LEUKOCYTES POC: NORMAL
URINE NITRITES POC: POSITIVE

## 2018-06-27 PROCEDURE — 87086 URINE CULTURE/COLONY COUNT: CPT | Performed by: INTERNAL MEDICINE

## 2018-06-27 PROCEDURE — 87186 SC STD MICRODIL/AGAR DIL: CPT | Performed by: INTERNAL MEDICINE

## 2018-06-27 PROCEDURE — 87077 CULTURE AEROBIC IDENTIFY: CPT | Performed by: INTERNAL MEDICINE

## 2018-06-27 RX ORDER — NYSTATIN 100000 [USP'U]/G
POWDER TOPICAL
Qty: 60 G | Refills: 3 | Status: SHIPPED | OUTPATIENT
Start: 2018-06-27 | End: 2019-05-16

## 2018-06-27 RX ORDER — CIPROFLOXACIN 500 MG/1
500 TABLET ORAL 2 TIMES DAILY
Qty: 10 TAB | Refills: 0 | Status: SHIPPED | OUTPATIENT
Start: 2018-06-27 | End: 2018-07-02

## 2018-06-27 RX ORDER — HYDROCODONE BITARTRATE AND ACETAMINOPHEN 5; 325 MG/1; MG/1
1 TABLET ORAL
Qty: 15 TAB | Refills: 0 | Status: SHIPPED | OUTPATIENT
Start: 2018-06-27 | End: 2018-10-22 | Stop reason: SDUPTHER

## 2018-06-27 RX ORDER — METHYLPREDNISOLONE 4 MG/1
TABLET ORAL
Qty: 1 DOSE PACK | Refills: 0 | Status: SHIPPED | OUTPATIENT
Start: 2018-06-27 | End: 2018-10-08

## 2018-06-27 RX ORDER — FUROSEMIDE 40 MG/1
TABLET ORAL
Qty: 30 TAB | Refills: 2 | Status: SHIPPED | OUTPATIENT
Start: 2018-06-27 | End: 2018-10-22 | Stop reason: SDUPTHER

## 2018-06-27 NOTE — PROGRESS NOTES
Melvi Delgado is a 76 y.o.  female and presents with     Chief Complaint   Patient presents with    Back Pain    Bladder Infection    Leg Swelling    Ringworm    Loss of Consciousness       Pt has frequent uriantion  Pt has leg swelling. NO chest pain or SOB. Pt has infection in her skin fold. Pt has chronic back pain. She says she probably pulled a muscle when she was taking care of her   Turning him over  Her  passed away last week and she is in grief. She also  had recurrent episode  Of syncope recently        Past Medical History:   Diagnosis Date    Degenerative disk disease     Depression     Depression     Diffuse idiopathic skeletal hyperostosis     Fracture of leg     Gallstone     Lymphedema of leg 1975    bilateral lower legs    Morbid obesity ([address]     Pulmonary arterial hypertension ([address]     MANPREET (stress urinary incontinence, female)      Past Surgical History:   Procedure Laterality Date    HX HYSTERECTOMY      HX KNEE REPLACEMENT      HX ORTHOPAEDIC      right shoulder and right knee replaced x 2, left knee replaced as well     Current Outpatient Prescriptions   Medication Sig    ciprofloxacin HCl (CIPRO) 500 mg tablet Take 1 Tab by mouth two (2) times a day for 5 days.  methylPREDNISolone (MEDROL DOSEPACK) 4 mg tablet As directed    HYDROcodone-acetaminophen (NORCO) 5-325 mg per tablet Take 1 Tab by mouth every eight (8) hours as needed for Pain. Max Daily Amount: 3 Tabs.  nystatin (MYCOSTATIN) powder Apply to rash 4 times a day    furosemide (LASIX) 40 mg tablet One po daily    atorvastatin (LIPITOR) 10 mg tablet Take 1 Tab by mouth daily.     oxybutynin (DITROPAN) 5 mg tablet TAKE ONE TABLET BY MOUTH ONCE DAILY    potassium chloride SR (KLOR-CON 10) 10 mEq tablet TAKE ONE TABLET BY MOUTH ONCE DAILY    DULoxetine (CYMBALTA) 60 mg capsule TAKE ONE CAPSULE BY MOUTH ONCE DAILY    losartan (COZAAR) 25 mg tablet TAKE ONE TABLET BY MOUTH ONCE DAILY  ergocalciferol (ERGOCALCIFEROL) 50,000 unit capsule Take 1 Cap by mouth every seven (7) days.  pantoprazole (PROTONIX) 40 mg tablet TAKE ONE TABLET BY MOUTH ONCE DAILY    clotrimazole (LOTRIMIN) 1 % topical cream Apply  to affected area two (2) times a day.  Miconazole powd Apply twice daily on the left leg    hydrocortisone (HYCORT) 1 % ointment Apply  to affected area two (2) times a day. use thin layer    alendronate (FOSAMAX) 70 mg tablet Take 1 Tab by mouth every seven (7) days.  metroNIDAZOLE (NORITATE) 1 % topical cream Apply  to affected area two (2) times daily (with meals). Use a thin layer to affected areas after washing    metFORMIN (GLUCOPHAGE) 500 mg tablet TAKE ONE TABLET BY MOUTH TWICE DAILY WITH MEALS    furosemide (LASIX) 40 mg tablet One po daily    metFORMIN (GLUCOPHAGE) 1,000 mg tablet Take 1 Tab by mouth two (2) times daily (with meals).  albuterol (PROVENTIL HFA, VENTOLIN HFA, PROAIR HFA) 90 mcg/actuation inhaler Take 2 Puffs by inhalation every six (6) hours as needed for Wheezing. No current facility-administered medications for this visit. Health Maintenance   Topic Date Due    GLAUCOMA SCREENING Q2Y  07/09/2017    Influenza Age 5 to Adult  08/01/2018    MEDICARE YEARLY EXAM  08/08/2018    BREAST CANCER SCRN MAMMOGRAM  01/02/2020    COLONOSCOPY  07/25/2023    DTaP/Tdap/Td series (2 - Td) 07/09/2025    Hepatitis C Screening  Completed    Bone Densitometry (Dexa) Screening  Completed    ZOSTER VACCINE AGE 60>  Completed    Pneumococcal 65+ Low/Medium Risk  Completed     Immunization History   Administered Date(s) Administered    Influenza High Dose Vaccine PF 10/19/2015, 08/12/2016    Influenza Vaccine 09/19/2017    Pneumococcal Conjugate (PCV-13) 08/07/2017    Pneumococcal Polysaccharide (PPSV-23) 07/09/2015, 07/26/2016    Tdap 07/09/2015     No LMP recorded. Patient has had a hysterectomy. Allergies and Intolerances:    Allergies Allergen Reactions    Adhesive Other (comments)     Skin burns - red spots    Lisinopril Other (comments)    Nitrofurantoin Other (comments)       Family History:   Family History   Problem Relation Age of Onset    Heart Disease Father     Diabetes Father        Social History:   She  reports that she has never smoked. She has never used smokeless tobacco.  She  reports that she does not drink alcohol.             Review of Systems: pos for grief  General: negative for - chills, fatigue, fever, weight change  Psych: negative for - anxiety, depression, irritability or mood swings  ENT: negative for - headaches, hearing change, nasal congestion, oral lesions, sneezing or sore throat  Heme/ Lymph: negative for - bleeding problems, bruising, pallor or swollen lymph nodes  Endo: negative for - hot flashes, polydipsia/polyuria or temperature intolerance  Resp: negative for - cough, shortness of breath or wheezing  CV: negative for - chest pain, edema or palpitations  GI: negative for - abdominal pain, change in bowel habits, constipation, diarrhea or nausea/vomiting, pos for dysuria  : negative for - dysuria, hematuria, incontinence, pelvic pain or vulvar/vaginal symptoms  MSK: negative for - joint pain, joint swelling or muscle pain, pos for back pain  Neuro: negative for - confusion, headaches, seizures or weakness  Derm: negative for - dry skin, hair changes, rash or skin lesion changes, pos for tinea          Physical:   Vitals:   Vitals:    06/27/18 1452   BP: 125/72   Pulse: 66   Resp: 16   Temp: 98.3 °F (36.8 °C)   TempSrc: Oral   SpO2: 98%   Weight: 289 lb (131.1 kg)   Height: 5' (1.524 m)           Exam:   HEENT- atraumatic,normocephalic, awake, oriented, well nourished, emotional and sad due to husbands loss  Neck - supple,no enlarged lymph nodes, no JVD, no thyromegaly  Chest- CTA, no rhonchi, no crackles  Heart- rrr, no murmurs / gallop/rub  Abdomen- soft,BS+,NT, no hepatosplenomegaly  Ext - pos bilateral leg edema   Neuro- no focal deficits. Power 5/5 all extremities  Skin - warm,dry, no obvious rashes. , tinea cruris          Review of Data:   LABS:   Lab Results   Component Value Date/Time    WBC 6.0 05/07/2018 04:06 AM    HGB 13.0 05/07/2018 04:06 AM    HCT 39.4 05/07/2018 04:06 AM    PLATELET 730 93/56/1771 04:06 AM     Lab Results   Component Value Date/Time    Sodium 145 (H) 05/07/2018 04:06 AM    Potassium 4.0 05/07/2018 04:06 AM    Chloride 103 05/07/2018 04:06 AM    CO2 27 05/07/2018 04:06 AM    Glucose 106 (H) 05/07/2018 04:06 AM    BUN 11 05/07/2018 04:06 AM    Creatinine 0.71 05/07/2018 04:06 AM     Lab Results   Component Value Date/Time    Cholesterol, total 166 05/07/2018 04:06 AM    HDL Cholesterol 73 05/07/2018 04:06 AM    LDL, calculated 71 05/07/2018 04:06 AM    Triglyceride 109 05/07/2018 04:06 AM     No results found for: GPT        Impression / Plan:        ICD-10-CM ICD-9-CM    1. Urinary tract infection without hematuria, site unspecified N39.0 599.0 ciprofloxacin HCl (CIPRO) 500 mg tablet   2. Painful urination R30.9 788.1 AMB POC URINALYSIS DIP STICK AUTO W/O MICRO      ciprofloxacin HCl (CIPRO) 500 mg tablet   3. Other chronic back pain M54.9 724.5 methylPREDNISolone (MEDROL DOSEPACK) 4 mg tablet    G89.29 338.29 HYDROcodone-acetaminophen (NORCO) 5-325 mg per tablet   4. Tinea cruris B35.6 110.3 nystatin (MYCOSTATIN) powder   5. Leg swelling M79.89 729.81 furosemide (LASIX) 40 mg tablet       Explained to patient risk benefits of the medications. Advised patient to stop meds if having any side effects. Pt verbalized understanding of the instructions. I have discussed the diagnosis with the patient and the intended plan as seen in the above orders. The patient has received an after-visit summary and questions were answered concerning future plans. I have discussed medication side effects and warnings with the patient as well.  I have reviewed the plan of care with the patient, accepted their input and they are in agreement with the treatment goals. Reviewed plan of care. Patient has provided input and agrees with goals.     Follow-up Disposition: Not on Cranston Schlatter, MD

## 2018-06-27 NOTE — MR AVS SNAPSHOT
[address] [id number] 
[phone] Patient: Jo Antony MRN: [id number] KAQ:[id number] Visit Information Date & Time Provider Department Dept. Phone Encounter #  
 6/27/2018  2:30 PM [id number] [phone] [id number] Follow-up Instructions Return in about 2 months (around 8/27/2018). Upcoming Health Maintenance Date Due  
 GLAUCOMA SCREENING Q2Y 7/9/2017 Influenza Age 5 to Adult 8/1/2018 MEDICARE YEARLY EXAM 8/8/2018 BREAST CANCER SCRN MAMMOGRAM 1/2/2020 COLONOSCOPY 7/25/2023 DTaP/Tdap/Td series (2 - Td) 7/9/2025 Allergies as of 6/27/2018  Review Complete On: 6/27/2018 By: Leydi Schumacher LPN Severity Noted Reaction Type Reactions Adhesive  11/24/2015    Other (comments) Skin burns - red spots Lisinopril  07/09/2015    Other (comments) Nitrofurantoin  05/13/2016    Other (comments) Current Immunizations  Never Reviewed Name Date Influenza High Dose Vaccine PF 8/12/2016, 10/19/2015 Influenza Vaccine 9/19/2017 Pneumococcal Conjugate (PCV-13) 8/7/2017 Pneumococcal Polysaccharide (PPSV-23) 7/26/2016  8:30 AM, 7/9/2015  9:00 AM  
 Tdap 7/9/2015  9:00 AM  
  
 Not reviewed this visit You Were Diagnosed With   
  
 Codes Comments Urinary tract infection without hematuria, site unspecified    -  Primary ICD-10-CM: N39.0 ICD-9-CM: 599.0 Painful urination     ICD-10-CM: R30.9 ICD-9-CM: 788.1 Other chronic back pain     ICD-10-CM: M54.9, G89.29 ICD-9-CM: 724.5, 338.29 Tinea cruris     ICD-10-CM: B35.6 ICD-9-CM: 110.3 Leg swelling     ICD-10-CM: M79.89 ICD-9-CM: 729.81 Vitals BP Pulse Temp Resp Height(growth percentile) Weight(growth percentile) 125/72 66 98.3 °F (36.8 °C) (Oral) 16 5' (1.524 m) 289 lb (131.1 kg) SpO2 BMI OB Status Smoking Status 98% 56.44 kg/m2 Hysterectomy Never Smoker Vitals History BMI and BSA Data Body Mass Index Body Surface Area  
 56.44 kg/m 2 2.36 m 2 Preferred Pharmacy Pharmacy Name Phone [address], Ignacia [place] [phone] Your Updated Medication List  
  
   
This list is accurate as of 6/27/18  3:33 PM.  Always use your most recent med list.  
  
  
  
  
 albuterol 90 mcg/actuation inhaler Commonly known as:  PROVENTIL HFA, VENTOLIN HFA, PROAIR HFA Take 2 Puffs by inhalation every six (6) hours as needed for Wheezing. alendronate 70 mg tablet Commonly known as:  FOSAMAX Take 1 Tab by mouth every seven (7) days. atorvastatin 10 mg tablet Commonly known as:  LIPITOR Take 1 Tab by mouth daily. ciprofloxacin HCl 500 mg tablet Commonly known as:  CIPRO Take 1 Tab by mouth two (2) times a day for 5 days. clotrimazole 1 % topical cream  
Commonly known as:  Lalito Medal Apply  to affected area two (2) times a day. DULoxetine 60 mg capsule Commonly known as:  CYMBALTA TAKE ONE CAPSULE BY MOUTH ONCE DAILY  
  
 ergocalciferol 50,000 unit capsule Commonly known as:  ERGOCALCIFEROL Take 1 Cap by mouth every seven (7) days. * furosemide 40 mg tablet Commonly known as:  LASIX One po daily * furosemide 40 mg tablet Commonly known as:  LASIX One po daily HYDROcodone-acetaminophen 5-325 mg per tablet Commonly known as:  Patrick Neil Take 1 Tab by mouth every eight (8) hours as needed for Pain. Max Daily Amount: 3 Tabs. hydrocortisone 1 % ointment Commonly known as:  HYCORT Apply  to affected area two (2) times a day. use thin layer  
  
 losartan 25 mg tablet Commonly known as:  COZAAR  
TAKE ONE TABLET BY MOUTH ONCE DAILY * metFORMIN 1,000 mg tablet Commonly known as:  GLUCOPHAGE Take 1 Tab by mouth two (2) times daily (with meals). * metFORMIN 500 mg tablet Commonly known as:  GLUCOPHAGE  
TAKE ONE TABLET BY MOUTH TWICE DAILY WITH MEALS  
  
 methylPREDNISolone 4 mg tablet Commonly known as:  Amanda Aguilera As directed  
  
 metroNIDAZOLE 1 % topical cream  
Commonly known as:  Kulwant Hoots Apply  to affected area two (2) times daily (with meals). Use a thin layer to affected areas after washing Miconazole Powd Apply twice daily on the left leg  
  
 nystatin powder Commonly known as:  MYCOSTATIN Apply to rash 4 times a day  
  
 oxybutynin 5 mg tablet Commonly known as:  DITROPAN  
TAKE ONE TABLET BY MOUTH ONCE DAILY pantoprazole 40 mg tablet Commonly known as:  PROTONIX  
TAKE ONE TABLET BY MOUTH ONCE DAILY potassium chloride SR 10 mEq tablet Commonly known as:  KLOR-CON 10  
TAKE ONE TABLET BY MOUTH ONCE DAILY * Notice: This list has 4 medication(s) that are the same as other medications prescribed for you. Read the directions carefully, and ask your doctor or other care provider to review them with you. Prescriptions Printed Refills HYDROcodone-acetaminophen (NORCO) 5-325 mg per tablet 0 Sig: Take 1 Tab by mouth every eight (8) hours as needed for Pain. Max Daily Amount: 3 Tabs. Class: Print Route: Oral  
  
Prescriptions Sent to Pharmacy Refills  
 ciprofloxacin HCl (CIPRO) 500 mg tablet 0 Sig: Take 1 Tab by mouth two (2) times a day for 5 days. Class: Normal  
 Pharmacy: [address], [address] Ph #: [phone] Route: Oral  
 methylPREDNISolone (MEDROL DOSEPACK) 4 mg tablet 0 Sig: As directed Class: Normal  
 Pharmacy: [address], [address] Ph #: [phone]  
 nystatin (MYCOSTATIN) powder 3 Sig: Apply to rash 4 times a day Class: Normal  
 Pharmacy: [address], [address] Ph #: [phone]  
 furosemide (LASIX) 40 mg tablet 2 Sig: One po daily Class: Normal  
 Pharmacy: [facility] [address] FLORECITA YUN [address], [address] Ph #: [phone] We Performed the Following AMB POC URINALYSIS DIP STICK AUTO W/O MICRO [71770 CPT(R)] Follow-up Instructions Return in about 2 months (around 8/27/2018). Introducing [facility]! Dear Negrita Mauricio: 
Thank you for requesting a [employer] account. Our records indicate that you already have an active [employer] account. You can access your account anytime at [email or web address] Did you know that you can access your hospital and ER discharge instructions at any time in [employer]? You can also review all of your test results from your hospital stay or ER visit. Additional Information If you have questions, please visit the Frequently Asked Questions section of the [employer] website at [email or web address] Remember, [employer] is NOT to be used for urgent needs. For medical emergencies, dial 911. Now available from your iPhone and Android! Please provide this summary of care documentation to your next provider. Your primary care clinician is listed as Ishmael Morrison. If you have any questions after today's visit, please call [phone].

## 2018-06-29 DIAGNOSIS — N39.41 URGE INCONTINENCE OF URINE: ICD-10-CM

## 2018-06-29 LAB
BACTERIA SPEC CULT: ABNORMAL
SERVICE CMNT-IMP: ABNORMAL

## 2018-06-29 RX ORDER — OXYBUTYNIN CHLORIDE 5 MG/1
TABLET ORAL
Qty: 90 TAB | Refills: 0 | Status: SHIPPED | OUTPATIENT
Start: 2018-06-29 | End: 2018-08-22 | Stop reason: SDUPTHER

## 2018-07-03 DIAGNOSIS — E78.00 HYPERCHOLESTEREMIA: ICD-10-CM

## 2018-07-03 RX ORDER — ATORVASTATIN CALCIUM 10 MG/1
TABLET, FILM COATED ORAL
Qty: 90 TAB | Refills: 0 | Status: SHIPPED | OUTPATIENT
Start: 2018-07-03 | End: 2018-09-04 | Stop reason: SDUPTHER

## 2018-08-22 DIAGNOSIS — M79.89 LEG SWELLING: ICD-10-CM

## 2018-08-22 DIAGNOSIS — N39.41 URGE INCONTINENCE OF URINE: ICD-10-CM

## 2018-08-22 RX ORDER — POTASSIUM CHLORIDE 750 MG/1
TABLET, FILM COATED, EXTENDED RELEASE ORAL
Qty: 90 TAB | Refills: 1 | Status: SHIPPED | OUTPATIENT
Start: 2018-08-22 | End: 2019-04-04 | Stop reason: SDUPTHER

## 2018-08-22 RX ORDER — OXYBUTYNIN CHLORIDE 5 MG/1
TABLET ORAL
Qty: 90 TAB | Refills: 0 | Status: SHIPPED | OUTPATIENT
Start: 2018-08-22 | End: 2018-12-26 | Stop reason: SDUPTHER

## 2018-08-27 ENCOUNTER — OFFICE VISIT (OUTPATIENT)
Dept: FAMILY MEDICINE CLINIC | Age: 68
End: 2018-08-27

## 2018-08-27 VITALS
WEIGHT: 285 LBS | HEART RATE: 75 BPM | DIASTOLIC BLOOD PRESSURE: 74 MMHG | SYSTOLIC BLOOD PRESSURE: 137 MMHG | RESPIRATION RATE: 18 BRPM | TEMPERATURE: 98.8 F | OXYGEN SATURATION: 95 % | BODY MASS INDEX: 55.95 KG/M2 | HEIGHT: 60 IN

## 2018-08-27 DIAGNOSIS — M54.50 CHRONIC MIDLINE LOW BACK PAIN WITHOUT SCIATICA: ICD-10-CM

## 2018-08-27 DIAGNOSIS — H25.013 CORTICAL AGE-RELATED CATARACT OF BOTH EYES: ICD-10-CM

## 2018-08-27 DIAGNOSIS — E66.01 MORBID OBESITY (HCC): ICD-10-CM

## 2018-08-27 DIAGNOSIS — M25.561 CHRONIC PAIN OF BOTH KNEES: ICD-10-CM

## 2018-08-27 DIAGNOSIS — G89.29 CHRONIC MIDLINE LOW BACK PAIN WITHOUT SCIATICA: ICD-10-CM

## 2018-08-27 DIAGNOSIS — M25.562 CHRONIC PAIN OF BOTH KNEES: ICD-10-CM

## 2018-08-27 DIAGNOSIS — Z00.00 MEDICARE ANNUAL WELLNESS VISIT, SUBSEQUENT: Primary | ICD-10-CM

## 2018-08-27 DIAGNOSIS — G89.29 CHRONIC PAIN OF BOTH KNEES: ICD-10-CM

## 2018-08-27 DIAGNOSIS — I89.0 LYMPHEDEMA OF BOTH LOWER EXTREMITIES: ICD-10-CM

## 2018-08-27 NOTE — PATIENT INSTRUCTIONS
Medicare Wellness Visit, Female     The best way to live healthy is to have a lifestyle where you eat a well-balanced diet, exercise regularly, limit alcohol use, and quit all forms of tobacco/nicotine, if applicable. Regular preventive services are another way to keep healthy. Preventive services (vaccines, screening tests, monitoring & exams) can help personalize your care plan, which helps you manage your own care. Screening tests can find health problems at the earliest stages, when they are easiest to treat. [facility] follows the current, evidence-based guidelines published by the [facility] ([facility]) when recommending preventive services for our patients. Because we follow these guidelines, sometimes recommendations change over time as research supports it. (For example, mammograms used to be recommended annually. Even though Medicare will still pay for an annual mammogram, the newer guidelines recommend a mammogram every two years for women of average risk.)  Of course, you and your doctor may decide to screen more often for some diseases, based on your risk and your health status. Preventive services for you include:  - Medicare offers their members a free annual wellness visit, which is time for you and your primary care provider to discuss and plan for your preventive service needs. Take advantage of this benefit every year!  -All adults over the age of 72 should receive the recommended pneumonia vaccines. Current USPSTF guidelines recommend a series of two vaccines for the best pneumonia protection.   -All adults should have a flu vaccine yearly and a tetanus vaccine every 10 years. All adults age 61 and older should receive a shingles vaccine once in their lifetime.    -A bone mass density test is recommended when a woman turns 65 to screen for osteoporosis. This test is only recommended one time, as a screening.  Some providers will use this same test as a disease monitoring tool if you already have osteoporosis. -All adults age 38-68 who are overweight should have a diabetes screening test once every three years.   -Other screening tests and preventive services for persons with diabetes include: an eye exam to screen for diabetic retinopathy, a kidney function test, a foot exam, and stricter control over your cholesterol.   -Cardiovascular screening for adults with routine risk involves an electrocardiogram (ECG) at intervals determined by your doctor.   -Colorectal cancer screenings should be done for adults age 54-65 with no increased risk factors for colorectal cancer. There are a number of acceptable methods of screening for this type of cancer. Each test has its own benefits and drawbacks. Discuss with your doctor what is most appropriate for you during your annual wellness visit. The different tests include: colonoscopy (considered the best screening method), a fecal occult blood test, a fecal DNA test, and sigmoidoscopy. -Breast cancer screenings are recommended every other year for women of normal risk, age 54-69.  -Cervical cancer screenings for women over age 72 are only recommended with certain risk factors.   -All adults born between [facility] should be screened once for Hepatitis C.      Here is a list of your current Health Maintenance items (your personalized list of preventive services) with a due date:  Health Maintenance Due   Topic Date Due    Glaucoma Screening   07/09/2017    Flu Vaccine  08/01/2018

## 2018-08-27 NOTE — ACP (ADVANCE CARE PLANNING)
Advance Care Planning (ACP) Provider Note - Comprehensive     Date of ACP Conversation: 08/27/18  Persons included in Conversation:  patient  Length of ACP Conversation in minutes:  <16 minutes (Non-Billable)    Authorized Decision Maker (if patient is incapable of making informed decisions): This person is:  Norma Silva ACP for ALL Patients with Decision Making Capacity:   Importance of advance care planning, including choosing a healthcare agent to communicate patient's healthcare decisions if patient lost the ability to make decisions, such as after a sudden illness or accident  Understanding of the healthcare agent role was assessed and information provided    Review of Existing Advance Directive:  Does this advance directive still reflect your preferences? Yes (Provide new form/Refer for assistance in updating)    For Serious or Chronic Illness:  Understanding of medical condition    Understanding of CPR, goals and expected outcomes, benefits and burdens discussed.     Interventions Provided:  Reviewed existing Advance Directive

## 2018-08-27 NOTE — PROGRESS NOTES
This is the Subsequent Medicare Annual Wellness Exam, performed 12 months or more after the Initial AWV or the last Subsequent AWV    I have reviewed the patient's medical history in detail and updated the computerized patient record. History     Past Medical History:   Diagnosis Date    Degenerative disk disease     Depression     Depression     Diffuse idiopathic skeletal hyperostosis     Fracture of leg     Gallstone     Lymphedema of leg 1975    bilateral lower legs    Morbid obesity (HCC)     Pulmonary arterial hypertension (HCC)     MANPREET (stress urinary incontinence, female)       Past Surgical History:   Procedure Laterality Date    HX HYSTERECTOMY      HX KNEE REPLACEMENT      HX ORTHOPAEDIC      right shoulder and right knee replaced x 2, left knee replaced as well     Current Outpatient Prescriptions   Medication Sig Dispense Refill    potassium chloride SR (KLOR-CON 10) 10 mEq tablet TAKE 1 TABLET BY MOUTH ONCE DAILY 90 Tab 1    oxybutynin (DITROPAN) 5 mg tablet TAKE 1 TABLET BY MOUTH ONCE DAILY 90 Tab 0    atorvastatin (LIPITOR) 10 mg tablet TAKE 1 TABLET BY MOUTH ONCE DAILY 90 Tab 0    pantoprazole (PROTONIX) 40 mg tablet TAKE ONE TABLET BY MOUTH ONCE DAILY 90 Tab 3    DULoxetine (CYMBALTA) 60 mg capsule TAKE ONE CAPSULE BY MOUTH ONCE DAILY 90 Cap 3    losartan (COZAAR) 25 mg tablet TAKE ONE TABLET BY MOUTH ONCE DAILY 90 Tab 0    methylPREDNISolone (MEDROL DOSEPACK) 4 mg tablet As directed 1 Dose Pack 0    HYDROcodone-acetaminophen (NORCO) 5-325 mg per tablet Take 1 Tab by mouth every eight (8) hours as needed for Pain. Max Daily Amount: 3 Tabs. 15 Tab 0    nystatin (MYCOSTATIN) powder Apply to rash 4 times a day 60 g 3    furosemide (LASIX) 40 mg tablet One po daily 30 Tab 2    clotrimazole (LOTRIMIN) 1 % topical cream Apply  to affected area two (2) times a day.  113 g 3    Miconazole powd Apply twice daily on the left leg 100 g 3    hydrocortisone (HYCORT) 1 % ointment Apply  to affected area two (2) times a day. use thin layer 30 g 0    alendronate (FOSAMAX) 70 mg tablet Take 1 Tab by mouth every seven (7) days. 4 Tab 0    metroNIDAZOLE (NORITATE) 1 % topical cream Apply  to affected area two (2) times daily (with meals). Use a thin layer to affected areas after washing 30 g 2    metFORMIN (GLUCOPHAGE) 500 mg tablet TAKE ONE TABLET BY MOUTH TWICE DAILY WITH MEALS 180 Tab 4    furosemide (LASIX) 40 mg tablet One po daily 30 Tab 3    ergocalciferol (ERGOCALCIFEROL) 50,000 unit capsule Take 1 Cap by mouth every seven (7) days. 4 Cap 12    metFORMIN (GLUCOPHAGE) 1,000 mg tablet Take 1 Tab by mouth two (2) times daily (with meals). 180 Tab 1    albuterol (PROVENTIL HFA, VENTOLIN HFA, PROAIR HFA) 90 mcg/actuation inhaler Take 2 Puffs by inhalation every six (6) hours as needed for Wheezing.  1 Inhaler 3     Allergies   Allergen Reactions    Adhesive Other (comments)     Skin burns - red spots    Lisinopril Other (comments)    Nitrofurantoin Other (comments)     Family History   Problem Relation Age of Onset    Heart Disease Father     Diabetes Father      Social History   Substance Use Topics    Smoking status: Never Smoker    Smokeless tobacco: Never Used    Alcohol use No     Patient Active Problem List   Diagnosis Code    Pulmonary HTN ([facility]) I27.20    Snoring R06.83    Morbid obesity ([facility]) E66.01    Stress incontinence N39.3    Depression F32.9    Impaired hearing H91.90    Sjogrens syndrome ([facility] [address] M35.00    Leg swelling M79.89    Cataract H26.9    Urinary incontinence R32    Epulis K06.8    HTN (hypertension), benign I10    Morbid obesity due to excess calories ([facility]) E66.01    Advance care planning Z71.89    Gastroesophageal reflux disease without esophagitis K21.9    Osteoporosis M81.0    Memory impairment R41.3    UTI (urinary tract infection) N39.0    Lymphedema I89.0    Lymphedema of both lower extremities I89.0       Depression Risk Factor Screening:   No flowsheet data found. Alcohol Risk Factor Screening: You do not drink alcohol or very rarely. Functional Ability and Level of Safety:   Hearing Loss  Hearing is good. Activities of Daily Living  The home contains: no safety equipment. Patient does total self care    Fall Risk  Fall Risk Assessment, last 12 mths 2/1/2017   Able to walk? Yes   Fall in past 12 months? No   Fall with injury? -   Number of falls in past 12 months -   Fall Risk Score -       Abuse Screen  Patient is not abused    Cognitive Screening   Evaluation of Cognitive Function:  Has your family/caregiver stated any concerns about your memory: no  Normal    Patient Care Team   Patient Care Team:  Dixie Reed MD as PCP - General (Internal Medicine)  Phyllis Null MD (General Surgery)    Assessment/Plan   Education and counseling provided:  Are appropriate based on today's review and evaluation    Diagnoses and all orders for this visit:    1. Medicare annual wellness visit, subsequent    2. Chronic pain of both knees  -     REFERRAL TO ORTHOPEDICS    3. Morbid obesity ([address]    4.  Cortical age-related cataract of both eyes  -     REFERRAL TO OPHTHALMOLOGY    5. Lymphedema of both lower extremities        Health Maintenance Due   Topic Date Due    GLAUCOMA SCREENING Q2Y  07/09/2017    Influenza Age 5 to Adult  08/01/2018

## 2018-08-27 NOTE — MR AVS SNAPSHOT
[facility] 
 
 
 [address] [facility] [address]  [place] [id number] 
[phone] Patient: Lia Higgins MRN: [id number] Visit Information Date & Time Provider Department Dept. Phone Encounter #  
 8/27/2018  2:30 PM Gia Bonilla, [address] [place] [address] [phone] [id number] Follow-up Instructions Return in about 3 months (around 11/27/2018), or if symptoms worsen or fail to improve. Your Appointments 9/6/2018 11:15 AM  
New Patient with Cleveland Keller MD  
Cardio Specialist at [facility]) Appt Note: NP ref by Dr. Jake Dubose // dx Syncope, unspecified syncope type  // notes in cc; r/s'd with the patient//referral for [facility] for syncope- see CC/rescheduled from [facility] [address] [place] Upcoming Health Maintenance Date Due  
 GLAUCOMA SCREENING Q2Y 7/9/2017 Influenza Age 5 to Adult 8/1/2018 MEDICARE YEARLY EXAM 8/28/2019 BREAST CANCER SCRN MAMMOGRAM 1/2/2020 COLONOSCOPY 7/25/2023 DTaP/Tdap/Td series (2 - Td) 7/9/2025 Allergies as of 8/27/2018  Review Complete On: 8/27/2018 By: Gia Bonilla MD  
  
 Severity Noted Reaction Type Reactions Adhesive  11/24/2015    Other (comments) Skin burns - red spots Lisinopril  07/09/2015    Other (comments) Nitrofurantoin  05/13/2016    Other (comments) Current Immunizations  Never Reviewed Name Date Influenza High Dose Vaccine PF 8/12/2016, 10/19/2015 Influenza Vaccine 9/19/2017 Pneumococcal Conjugate (PCV-13) 8/7/2017 Pneumococcal Polysaccharide (PPSV-23) 7/26/2016  8:30 AM, 7/9/2015  9:00 AM  
 Tdap 7/9/2015  9:00 AM  
  
 Not reviewed this visit You Were Diagnosed With   
  
 Codes Comments Medicare annual wellness visit, subsequent    -  Primary ICD-10-CM: Z00.00 ICD-9-CM: V70.0 Chronic pain of both knees     ICD-10-CM: M25.561, M25.562, G89.29 ICD-9-CM: 719.46, 338.29 Morbid obesity ([facility] [address]     ICD-10-CM: E66.01 
ICD-9-CM: 278.01 Cortical age-related cataract of both eyes     ICD-10-CM: H25.013 
ICD-9-CM: 366.15 Lymphedema of both lower extremities     ICD-10-CM: I89.0 ICD-9-CM: 621.2 Vitals BP Pulse Temp Resp Height(growth percentile) Weight(growth percentile)  
 137/74 75 98.8 °F (37.1 °C) (Oral) 18 5' (1.524 m) 285 lb (129.3 kg) SpO2 BMI OB Status Smoking Status 95% 55.66 kg/m2 Hysterectomy Never Smoker Vitals History BMI and BSA Data Body Mass Index Body Surface Area  
 55.66 kg/m 2 2.34 m 2 Preferred Pharmacy Pharmacy Name Phone [facility] [address], [address] [place] [address] [phone] Your Updated Medication List  
  
   
This list is accurate as of 8/27/18  3:28 PM.  Always use your most recent med list.  
  
  
  
  
 albuterol 90 mcg/actuation inhaler Commonly known as:  PROVENTIL HFA, VENTOLIN HFA, PROAIR HFA Take 2 Puffs by inhalation every six (6) hours as needed for Wheezing. alendronate 70 mg tablet Commonly known as:  FOSAMAX Take 1 Tab by mouth every seven (7) days. atorvastatin 10 mg tablet Commonly known as:  LIPITOR  
TAKE 1 TABLET BY MOUTH ONCE DAILY  
  
 clotrimazole 1 % topical cream  
Commonly known as:  Carolina Cater Apply  to affected area two (2) times a day. DULoxetine 60 mg capsule Commonly known as:  CYMBALTA TAKE ONE CAPSULE BY MOUTH ONCE DAILY  
  
 ergocalciferol 50,000 unit capsule Commonly known as:  ERGOCALCIFEROL Take 1 Cap by mouth every seven (7) days. * furosemide 40 mg tablet Commonly known as:  LASIX One po daily * furosemide 40 mg tablet Commonly known as:  LASIX One po daily HYDROcodone-acetaminophen 5-325 mg per tablet Commonly known as:  Rhona Tellez  
 Take 1 Tab by mouth every eight (8) hours as needed for Pain. Max Daily Amount: 3 Tabs. hydrocortisone 1 % ointment Commonly known as:  HYCORT Apply  to affected area two (2) times a day. use thin layer  
  
 losartan 25 mg tablet Commonly known as:  COZAAR  
TAKE ONE TABLET BY MOUTH ONCE DAILY * metFORMIN 1,000 mg tablet Commonly known as:  GLUCOPHAGE Take 1 Tab by mouth two (2) times daily (with meals). * metFORMIN 500 mg tablet Commonly known as:  GLUCOPHAGE  
TAKE ONE TABLET BY MOUTH TWICE DAILY WITH MEALS  
  
 methylPREDNISolone 4 mg tablet Commonly known as:  Cleta Loss As directed  
  
 metroNIDAZOLE 1 % topical cream  
Commonly known as:  Kennis Held Apply  to affected area two (2) times daily (with meals). Use a thin layer to affected areas after washing Miconazole Powd Apply twice daily on the left leg  
  
 nystatin powder Commonly known as:  MYCOSTATIN Apply to rash 4 times a day  
  
 oxybutynin 5 mg tablet Commonly known as:  DITROPAN  
TAKE 1 TABLET BY MOUTH ONCE DAILY pantoprazole 40 mg tablet Commonly known as:  PROTONIX  
TAKE ONE TABLET BY MOUTH ONCE DAILY potassium chloride SR 10 mEq tablet Commonly known as:  KLOR-CON 10  
TAKE 1 TABLET BY MOUTH ONCE DAILY * Notice: This list has 4 medication(s) that are the same as other medications prescribed for you. Read the directions carefully, and ask your doctor or other care provider to review them with you. We Performed the Following REFERRAL TO OPHTHALMOLOGY [REF57 Custom] REFERRAL TO ORTHOPEDICS [BAH427 Custom] Follow-up Instructions Return in about 3 months (around 11/27/2018), or if symptoms worsen or fail to improve. Referral Information Referral ID Referred By Referred To  
  
 [id number] [facility] [address] [facility], [address] [place] [address] Phone: [phone] Fax: [phone] Visits Status Start Date End Date 1 New Request 8/27/18 8/27/19 If your referral has a status of pending review or denied, additional information will be sent to support the outcome of this decision. Referral ID Referred By Referred To  
 [id number] [facility], [address] [place] [address], MD  
    [facility] Phone: [phone] Fax: [phone] Visits Status Start Date End Date 1 New Request 8/27/18 8/27/19 If your referral has a status of pending review or denied, additional information will be sent to support the outcome of this decision. Patient Instructions Medicare Wellness Visit, Female The best way to live healthy is to have a lifestyle where you eat a well-balanced diet, exercise regularly, limit alcohol use, and quit all forms of tobacco/nicotine, if applicable. Regular preventive services are another way to keep healthy. Preventive services (vaccines, screening tests, monitoring & exams) can help personalize your care plan, which helps you manage your own care. Screening tests can find health problems at the earliest stages, when they are easiest to treat. [facility] follows the current, evidence-based guidelines published by the [place] (USPSTF) when recommending preventive services for our patients. Because we follow these guidelines, sometimes recommendations change over time as research supports it. (For example, mammograms used to be recommended annually. Even though Medicare will still pay for an annual mammogram, the newer guidelines recommend a mammogram every two years for women of average risk.) Of course, you and your doctor may decide to screen more often for some diseases, based on your risk and your health status. Preventive services for you include: - Medicare offers their members a free annual wellness visit, which is time for you and your primary care provider to discuss and plan for your preventive service needs. Take advantage of this benefit every year! 
-All adults over the age of 72 should receive the recommended pneumonia vaccines. Current USPSTF guidelines recommend a series of two vaccines for the best pneumonia protection.  
-All adults should have a flu vaccine yearly and a tetanus vaccine every 10 years. All adults age 61 and older should receive a shingles vaccine once in their lifetime.   
-A bone mass density test is recommended when a woman turns 65 to screen for osteoporosis. This test is only recommended one time, as a screening. Some providers will use this same test as a disease monitoring tool if you already have osteoporosis. -All adults age 38-68 who are overweight should have a diabetes screening test once every three years.  
-Other screening tests and preventive services for persons with diabetes include: an eye exam to screen for diabetic retinopathy, a kidney function test, a foot exam, and stricter control over your cholesterol.  
-Cardiovascular screening for adults with routine risk involves an electrocardiogram (ECG) at intervals determined by your doctor.  
-Colorectal cancer screenings should be done for adults age 54-65 with no increased risk factors for colorectal cancer. There are a number of acceptable methods of screening for this type of cancer. Each test has its own benefits and drawbacks. Discuss with your doctor what is most appropriate for you during your annual wellness visit. The different tests include: colonoscopy (considered the best screening method), a fecal occult blood test, a fecal DNA test, and sigmoidoscopy.  
-Breast cancer screenings are recommended every other year for women of normal risk, age 54-69. 
-Cervical cancer screenings for women over age 72 are only recommended with certain risk factors.  
-All adults born between [birth year] and 1965 should be screened once for Hepatitis C. Here is a list of your current Health Maintenance items (your personalized list of preventive services) with a due date: 
Health Maintenance Due Topic Date Due  Glaucoma Screening   07/09/2017  Flu Vaccine  08/01/2018 Introducing Sharon Beasley! Dear Maria Ines Winters: 
Thank you for requesting a [employer] account. Our records indicate that you already have an active [employer] account. You can access your account anytime at [email or web address] Did you know that you can access your hospital and ER discharge instructions at any time in [employer]? You can also review all of your test results from your hospital stay or ER visit. Additional Information If you have questions, please visit the Frequently Asked Questions section of the [employer] website at [email or web address] Remember, [employer] is NOT to be used for urgent needs. For medical emergencies, dial 911. Now available from your iPhone and Android! Please provide this summary of care documentation to your next provider. Your primary care clinician is listed as Jayashree Meier. If you have any questions after today's visit, please call [phone].

## 2018-08-30 DIAGNOSIS — I10 ESSENTIAL HYPERTENSION: ICD-10-CM

## 2018-08-30 RX ORDER — LOSARTAN POTASSIUM 25 MG/1
TABLET ORAL
Qty: 90 TAB | Refills: 1 | Status: SHIPPED | OUTPATIENT
Start: 2018-08-30 | End: 2018-10-22 | Stop reason: SDUPTHER

## 2018-09-04 DIAGNOSIS — E78.00 HYPERCHOLESTEREMIA: Primary | ICD-10-CM

## 2018-09-04 RX ORDER — ATORVASTATIN CALCIUM 10 MG/1
10 TABLET, FILM COATED ORAL DAILY
Qty: 90 TAB | Refills: 0 | Status: SHIPPED | OUTPATIENT
Start: 2018-09-04 | End: 2018-12-26 | Stop reason: SDUPTHER

## 2018-09-10 DIAGNOSIS — F32.89 OTHER DEPRESSION: ICD-10-CM

## 2018-09-10 RX ORDER — DULOXETIN HYDROCHLORIDE 60 MG/1
60 CAPSULE, DELAYED RELEASE ORAL DAILY
Qty: 90 CAP | Refills: 3 | Status: SHIPPED | OUTPATIENT
Start: 2018-09-10 | End: 2019-02-19 | Stop reason: ALTCHOICE

## 2018-10-05 ENCOUNTER — HOSPITAL ENCOUNTER (EMERGENCY)
Age: 68
Discharge: HOME | End: 2018-10-05
Attending: EMERGENCY MEDICINE
Payer: MEDICARE

## 2018-10-05 ENCOUNTER — APPOINTMENT (OUTPATIENT)
Dept: VASCULAR SURGERY | Age: 68
End: 2018-10-05
Attending: PHYSICIAN ASSISTANT
Payer: MEDICARE

## 2018-10-05 ENCOUNTER — TELEPHONE (OUTPATIENT)
Dept: INTERNAL MEDICINE CLINIC | Age: 68
End: 2018-10-05

## 2018-10-05 VITALS
WEIGHT: 280 LBS | HEIGHT: 60 IN | TEMPERATURE: 97.9 F | OXYGEN SATURATION: 100 % | DIASTOLIC BLOOD PRESSURE: 68 MMHG | SYSTOLIC BLOOD PRESSURE: 153 MMHG | HEART RATE: 67 BPM | RESPIRATION RATE: 13 BRPM | BODY MASS INDEX: 54.97 KG/M2

## 2018-10-05 DIAGNOSIS — N39.0 URINARY TRACT INFECTION WITH HEMATURIA, SITE UNSPECIFIED: ICD-10-CM

## 2018-10-05 DIAGNOSIS — M79.604 CHRONIC PAIN OF RIGHT LOWER EXTREMITY: Primary | ICD-10-CM

## 2018-10-05 DIAGNOSIS — R31.9 URINARY TRACT INFECTION WITH HEMATURIA, SITE UNSPECIFIED: ICD-10-CM

## 2018-10-05 DIAGNOSIS — G89.29 CHRONIC PAIN OF RIGHT LOWER EXTREMITY: Primary | ICD-10-CM

## 2018-10-05 LAB
ALBUMIN SERPL-MCNC: 3.2 G/DL (ref 3.4–5)
ALBUMIN/GLOB SERPL: 1.1 {RATIO} (ref 0.8–1.7)
ALP SERPL-CCNC: 75 U/L (ref 45–117)
ALT SERPL-CCNC: 14 U/L (ref 13–56)
ANION GAP SERPL CALC-SCNC: 6 MMOL/L (ref 3–18)
APPEARANCE UR: ABNORMAL
AST SERPL-CCNC: 17 U/L (ref 15–37)
BACTERIA URNS QL MICRO: ABNORMAL /HPF
BASOPHILS # BLD: 0 K/UL (ref 0–0.1)
BASOPHILS NFR BLD: 0 % (ref 0–2)
BILIRUB SERPL-MCNC: 2.5 MG/DL (ref 0.2–1)
BILIRUB UR QL: NEGATIVE
BNP SERPL-MCNC: 457 PG/ML (ref 0–900)
BUN SERPL-MCNC: 9 MG/DL (ref 7–18)
BUN/CREAT SERPL: 15 (ref 12–20)
CALCIUM SERPL-MCNC: 8.5 MG/DL (ref 8.5–10.1)
CHLORIDE SERPL-SCNC: 111 MMOL/L (ref 100–108)
CO2 SERPL-SCNC: 26 MMOL/L (ref 21–32)
COLOR UR: YELLOW
CREAT SERPL-MCNC: 0.62 MG/DL (ref 0.6–1.3)
DIFFERENTIAL METHOD BLD: ABNORMAL
EOSINOPHIL # BLD: 0.1 K/UL (ref 0–0.4)
EOSINOPHIL NFR BLD: 2 % (ref 0–5)
EPITH CASTS URNS QL MICRO: ABNORMAL /LPF (ref 0–5)
ERYTHROCYTE [DISTWIDTH] IN BLOOD BY AUTOMATED COUNT: 13.2 % (ref 11.6–14.5)
GLOBULIN SER CALC-MCNC: 2.9 G/DL (ref 2–4)
GLUCOSE SERPL-MCNC: 84 MG/DL (ref 74–99)
GLUCOSE UR STRIP.AUTO-MCNC: NEGATIVE MG/DL
HCT VFR BLD AUTO: 35.8 % (ref 35–45)
HGB BLD-MCNC: 11.7 G/DL (ref 12–16)
HGB UR QL STRIP: ABNORMAL
KETONES UR QL STRIP.AUTO: NEGATIVE MG/DL
LEUKOCYTE ESTERASE UR QL STRIP.AUTO: ABNORMAL
LYMPHOCYTES # BLD: 1 K/UL (ref 0.9–3.6)
LYMPHOCYTES NFR BLD: 19 % (ref 21–52)
MCH RBC QN AUTO: 30.2 PG (ref 24–34)
MCHC RBC AUTO-ENTMCNC: 32.7 G/DL (ref 31–37)
MCV RBC AUTO: 92.3 FL (ref 74–97)
MONOCYTES # BLD: 0.9 K/UL (ref 0.05–1.2)
MONOCYTES NFR BLD: 17 % (ref 3–10)
NEUTS SEG # BLD: 3.4 K/UL (ref 1.8–8)
NEUTS SEG NFR BLD: 62 % (ref 40–73)
NITRITE UR QL STRIP.AUTO: NEGATIVE
PH UR STRIP: 7 [PH] (ref 5–8)
PLATELET # BLD AUTO: 173 K/UL (ref 135–420)
PMV BLD AUTO: 10 FL (ref 9.2–11.8)
POTASSIUM SERPL-SCNC: 4.5 MMOL/L (ref 3.5–5.5)
PROT SERPL-MCNC: 6.1 G/DL (ref 6.4–8.2)
PROT UR STRIP-MCNC: NEGATIVE MG/DL
RBC # BLD AUTO: 3.88 M/UL (ref 4.2–5.3)
RBC #/AREA URNS HPF: ABNORMAL /HPF (ref 0–5)
SODIUM SERPL-SCNC: 143 MMOL/L (ref 136–145)
SP GR UR REFRACTOMETRY: 1.02 (ref 1–1.03)
UROBILINOGEN UR QL STRIP.AUTO: 1 EU/DL (ref 0.2–1)
WBC # BLD AUTO: 5.5 K/UL (ref 4.6–13.2)
WBC URNS QL MICRO: ABNORMAL /HPF (ref 0–4)

## 2018-10-05 PROCEDURE — 99284 EMERGENCY DEPT VISIT MOD MDM: CPT

## 2018-10-05 PROCEDURE — 83880 ASSAY OF NATRIURETIC PEPTIDE: CPT | Performed by: PHYSICIAN ASSISTANT

## 2018-10-05 PROCEDURE — 87077 CULTURE AEROBIC IDENTIFY: CPT | Performed by: PHYSICIAN ASSISTANT

## 2018-10-05 PROCEDURE — 80053 COMPREHEN METABOLIC PANEL: CPT | Performed by: PHYSICIAN ASSISTANT

## 2018-10-05 PROCEDURE — 93971 EXTREMITY STUDY: CPT

## 2018-10-05 PROCEDURE — 87086 URINE CULTURE/COLONY COUNT: CPT | Performed by: PHYSICIAN ASSISTANT

## 2018-10-05 PROCEDURE — 87186 SC STD MICRODIL/AGAR DIL: CPT | Performed by: PHYSICIAN ASSISTANT

## 2018-10-05 PROCEDURE — 85025 COMPLETE CBC W/AUTO DIFF WBC: CPT | Performed by: PHYSICIAN ASSISTANT

## 2018-10-05 PROCEDURE — 81001 URINALYSIS AUTO W/SCOPE: CPT | Performed by: PHYSICIAN ASSISTANT

## 2018-10-05 PROCEDURE — 74011250637 HC RX REV CODE- 250/637: Performed by: PHYSICIAN ASSISTANT

## 2018-10-05 RX ORDER — TRAMADOL HYDROCHLORIDE 50 MG/1
50 TABLET ORAL
Status: COMPLETED | OUTPATIENT
Start: 2018-10-05 | End: 2018-10-05

## 2018-10-05 RX ORDER — CEPHALEXIN 500 MG/1
1000 CAPSULE ORAL 2 TIMES DAILY
Qty: 28 CAP | Refills: 0 | Status: SHIPPED | OUTPATIENT
Start: 2018-10-05 | End: 2018-10-12

## 2018-10-05 RX ADMIN — TRAMADOL HYDROCHLORIDE 50 MG: 50 TABLET, FILM COATED ORAL at 11:50

## 2018-10-05 NOTE — ED PROVIDER NOTES
EMERGENCY DEPARTMENT HISTORY AND PHYSICAL EXAM 
 
11:09 AM 
 
 
Date: 10/5/2018 Patient Name: Lebron Richard History of Presenting Illness Chief Complaint Patient presents with  Leg Pain History Provided By: Patient Chief Complaint: leg pain Duration:  Hours Timing:  Constant Location: right leg Quality: Stabbing and Mary [place]ago Severity: Severe Modifying Factors: weight bearing makes worse Associated Symptoms: low back pain Additional History (Context): Lebron Richard is a 76 y.o. female with diabetes and chronic knee pain, chronic back pain, bilateral lymphadema, morbid obesity who presents with right  Leg pain since this morning. States went to stand up this morning and had shooting pain behind her right knee. Pain shot all the way up to her back. Pt was seen yesterday by her Orthopedic for chronic knee pain since she had her knee replacement. Review of her chart shows where she has had this complaint for some time. Had X-rays that showed good alignment and hardware in place. Denies any new trauma since yesterday. PCP: Argelia Gruber MD 
 
Current Outpatient Prescriptions Medication Sig Dispense Refill  cephALEXin (KEFLEX) 500 mg capsule Take 2 Caps by mouth two (2) times a day for 7 days. 28 Cap 0  
 DULoxetine (CYMBALTA) 60 mg capsule Take 1 Cap by mouth daily. 90 Cap 3  
 atorvastatin (LIPITOR) 10 mg tablet Take 1 Tab by mouth daily. 90 Tab 0  
 losartan (COZAAR) 25 mg tablet TAKE 1 TABLET BY MOUTH ONCE DAILY 90 Tab 1  potassium chloride SR (KLOR-CON 10) 10 mEq tablet TAKE 1 TABLET BY MOUTH ONCE DAILY 90 Tab 1  
 oxybutynin (DITROPAN) 5 mg tablet TAKE 1 TABLET BY MOUTH ONCE DAILY 90 Tab 0  
 methylPREDNISolone (MEDROL DOSEPACK) 4 mg tablet As directed 1 Dose Pack 0  
 HYDROcodone-acetaminophen (NORCO) 5-325 mg per tablet Take 1 Tab by mouth every eight (8) hours as needed for Pain. Max Daily Amount: 3 Tabs.  15 Tab 0  
  nystatin (MYCOSTATIN) powder Apply to rash 4 times a day 60 g 3  furosemide (LASIX) 40 mg tablet One po daily 30 Tab 2  
 pantoprazole (PROTONIX) 40 mg tablet TAKE ONE TABLET BY MOUTH ONCE DAILY 90 Tab 3  clotrimazole (LOTRIMIN) 1 % topical cream Apply  to affected area two (2) times a day. 113 g 3  
 Miconazole powd Apply twice daily on the left leg 100 g 3  
 hydrocortisone (HYCORT) 1 % ointment Apply  to affected area two (2) times a day. use thin layer 30 g 0  
 alendronate (FOSAMAX) 70 mg tablet Take 1 Tab by mouth every seven (7) days. 4 Tab 0  
 metroNIDAZOLE (NORITATE) 1 % topical cream Apply  to affected area two (2) times daily (with meals). Use a thin layer to affected areas after washing 30 g 2  
 metFORMIN (GLUCOPHAGE) 500 mg tablet TAKE ONE TABLET BY MOUTH TWICE DAILY WITH MEALS 180 Tab 4  
 losartan (COZAAR) 25 mg tablet TAKE ONE TABLET BY MOUTH ONCE DAILY 90 Tab 0  
 furosemide (LASIX) 40 mg tablet One po daily 30 Tab 3  
 ergocalciferol (ERGOCALCIFEROL) 50,000 unit capsule Take 1 Cap by mouth every seven (7) days. 4 Cap 12  
 metFORMIN (GLUCOPHAGE) 1,000 mg tablet Take 1 Tab by mouth two (2) times daily (with meals). 180 Tab 1  
 albuterol (PROVENTIL HFA, VENTOLIN HFA, PROAIR HFA) 90 mcg/actuation inhaler Take 2 Puffs by inhalation every six (6) hours as needed for Wheezing. 1 Inhaler 3 Past History Past Medical History: 
Past Medical History:  
Diagnosis Date  Degenerative disk disease  Depression  Depression  Diffuse idiopathic skeletal hyperostosis  Fracture of leg  Gallstone  Lymphedema of leg 1975  
 bilateral lower legs  Morbid obesity ([address]  Pulmonary arterial hypertension ([address]  MANPREET (stress urinary incontinence, female) Past Surgical History: 
Past Surgical History:  
Procedure Laterality Date  HX HYSTERECTOMY  HX KNEE REPLACEMENT    
 HX ORTHOPAEDIC    
 right shoulder and right knee replaced x 2, left knee replaced as well Family History: 
Family History Problem Relation Age of Onset  Heart Disease Father  Diabetes Father Social History: 
Social History Substance Use Topics  Smoking status: Never Smoker  Smokeless tobacco: Never Used  Alcohol use No  
 
 
Allergies: Allergies Allergen Reactions  Adhesive Other (comments) Skin burns - red spots  Lisinopril Other (comments)  Nitrofurantoin Other (comments) Review of Systems Constitutional:  Denies malaise, fever, chills. Head:  Denies injury. Face:  Denies injury or pain. ENMT:  Denies sore throat. Neck:  Denies injury or pain. Chest:  Denies injury. Cardiac:  Denies chest pain or palpitations. Respiratory:  Denies cough, wheezing, difficulty breathing, shortness of breath. GI/ABD:  Denies injury, pain, distention, nausea, vomiting, diarrhea. :  Denies injury, pain, dysuria or urgency. Back:  Denies injury or pain. Pelvis:  Denies injury or pain. Extremity/MS:  Leg pain Neuro:  Denies headache, LOC, dizziness, neurologic symptoms/deficits/paresthesias. Skin: Denies injury, rash, itching or skin changes. Physical Exam  
 
Visit Vitals  /68  Pulse 67  Temp 97.9 °F (36.6 °C)  Resp 13  Ht 5' (1.524 m)  Wt 127 kg (280 lb)  SpO2 100%  BMI 54.68 kg/m2 CONSTITUTIONAL: Alert, in no apparent distress; well-developed; well-nourished. Morbidly obese HEAD:  Normocephalic, atraumatic. EYES: PERRL; EOM's intact. ENTM: Nose: No rhinorrhea; Throat: mucous membranes moist. Posterior pharynx-normal. 
Neck:  No JVD, supple without lymphadenopathy. RESP: Chest clear, equal breath sounds. CV: S1 and S2 WNL; No murmurs, gallops or rubs. GI: Abdomen soft and non-tender. No masses or organomegaly. BACK: TTP right lumbar paraspinal. Limited ROM due to body habitus. UPPER EXT:  Normal inspection. LOWER EXT: significant bilateral lower leg edema, Right leg good ROM but limited due to body habitus. Knee stable, TTP posterior aspect along calf and behind knee, good cap refill but unable to palpate pedal pulse due to edema. Skin warm, pink NEURO: strength 5/5 and sym, sensation intact. SKIN: No rashes; Normal for age and stage. PSYCH:  Alert and oriented, normal affect. Diagnostic Study Results Labs - Recent Results (from the past 12 hour(s)) CBC WITH AUTOMATED DIFF Collection Time: 10/05/18 11:05 AM  
Result Value Ref Range WBC 5.5 4.6 - 13.2 K/uL  
 RBC 3.88 (L) 4.20 - 5.30 M/uL  
 HGB 11.7 (L) 12.0 - 16.0 g/dL HCT 35.8 35.0 - 45.0 % MCV 92.3 74.0 - 97.0 FL  
 MCH 30.2 24.0 - 34.0 PG  
 MCHC 32.7 31.0 - 37.0 g/dL  
 RDW 13.2 11.6 - 14.5 % PLATELET 160 177 - 417 K/uL MPV 10.0 9.2 - 11.8 FL  
 NEUTROPHILS 62 40 - 73 % LYMPHOCYTES 19 (L) 21 - 52 % MONOCYTES 17 (H) 3 - 10 % EOSINOPHILS 2 0 - 5 % BASOPHILS 0 0 - 2 %  
 ABS. NEUTROPHILS 3.4 1.8 - 8.0 K/UL  
 ABS. LYMPHOCYTES 1.0 0.9 - 3.6 K/UL  
 ABS. MONOCYTES 0.9 0.05 - 1.2 K/UL  
 ABS. EOSINOPHILS 0.1 0.0 - 0.4 K/UL  
 ABS. BASOPHILS 0.0 0.0 - 0.1 K/UL  
 DF AUTOMATED METABOLIC PANEL, COMPREHENSIVE Collection Time: 10/05/18 11:05 AM  
Result Value Ref Range Sodium 143 136 - 145 mmol/L Potassium 4.5 3.5 - 5.5 mmol/L Chloride 111 (H) 100 - 108 mmol/L  
 CO2 26 21 - 32 mmol/L Anion gap 6 3.0 - 18 mmol/L Glucose 84 74 - 99 mg/dL BUN 9 7.0 - 18 MG/DL Creatinine 0.62 0.6 - 1.3 MG/DL  
 BUN/Creatinine ratio 15 12 - 20 GFR est AA >60 >60 ml/min/1.73m2 GFR est non-AA >60 >60 ml/min/1.73m2 Calcium 8.5 8.5 - 10.1 MG/DL Bilirubin, total 2.5 (H) 0.2 - 1.0 MG/DL  
 ALT (SGPT) 14 13 - 56 U/L  
 AST (SGOT) 17 15 - 37 U/L Alk. phosphatase 75 45 - 117 U/L Protein, total 6.1 (L) 6.4 - 8.2 g/dL Albumin 3.2 (L) 3.4 - 5.0 g/dL Globulin 2.9 2.0 - 4.0 g/dL A-G Ratio 1.1 0.8 - 1.7 NT-PRO BNP Collection Time: 10/05/18 11:05 AM  
Result Value Ref Range NT pro- 0 - 900 PG/ML  
URINALYSIS W/ RFLX MICROSCOPIC Collection Time: 10/05/18 12:18 PM  
Result Value Ref Range Color YELLOW Appearance TURBID Specific gravity 1.020 1.005 - 1.030    
 pH (UA) 7.0 5.0 - 8.0 Protein NEGATIVE  NEG mg/dL Glucose NEGATIVE  NEG mg/dL Ketone NEGATIVE  NEG mg/dL Bilirubin NEGATIVE  NEG Blood TRACE (A) NEG Urobilinogen 1.0 0.2 - 1.0 EU/dL Nitrites NEGATIVE  NEG Leukocyte Esterase LARGE (A) NEG Radiologic Studies - No orders to display Medical Decision Making I am the first provider for this patient. I reviewed the vital signs, available nursing notes, past medical history, past surgical history, family history and social history. Vital Signs-Reviewed the patient's vital signs. Pulse Oximetry Analysis -  100 on room air (Interpretation)wnl Records Reviewed: Nursing Notes, Old Medical Records, Previous Radiology Studies and Previous Laboratory Studies (Time of Review: 11:09 AM) Reviewed Orthopedic notes from yesterday's visit. ED Course: Progress Notes, Reevaluation, and Consults: 
 
 
Provider Notes (Medical Decision Making): Will rule out DVT, Pt had X-rays and evaluation by orthopedics yesterday. No new trauma so will hold off on imaging. IMPRESSION AND MEDICAL DECISION MAKING: 
Based upon the patient's presentation with noted HPI and PE, along with the work up done in the emergency department, I believe that the patient has a UTI and chronic leg pain. Does not have a DVT. Will have her follow up with her Ortho and PCP. Labs and US are reassuring. The patient will be discharged home. Warning signs of worsening condition were discussed and understood by the patient.  Based on patient's age, coexisting illness, exam, and the results of this ED evaluation, the decision to treat as an outpatient was made. Based on the information available at time of discharge, acute pathology requiring immediate intervention was deemed relative unlikely. While it is impossible to completely exclude the possibility of underlying serious disease or worsening of condition, I feel the relative likelihood is extremely low. I discussed this uncertainty with the patient, who understood ED evaluation and treatment and felt comfortable with the outpatient treatment plan. All questions regarding care, test results, and follow up were answered. The patient is stable and appropriate to discharge. They understand that they should return to the emergency department for any new or worsening symptoms. I stressed the importance of follow up for repeat assessment and possibly further evaluation/treatment. Diagnosis Clinical Impression: 1. Chronic pain of right lower extremity 2. Urinary tract infection with hematuria, site unspecified   
 
_______________________________

## 2018-10-05 NOTE — DISCHARGE INSTRUCTIONS

## 2018-10-05 NOTE — TELEPHONE ENCOUNTER
Paged by pt. Stated she was in severe pain in her leg. Stated that she went to the ER and they did a scan to r/o a DVT. She was told that was negative and that she probably had a \"nerve pain. \" States she can not bear weight. Stated she needed something strong for the pain. I told her I/we do not call in pain medication and that I would not w/o seeing her and that I could not tell her what was wrong over the phone. I advised her that if the pain was that intense she should go back to the ER. She stated she did not like the provider who saw her and did not want to go back. She asked for Dr. Kathi Hammer phone #. I told her I could not do that. She said \"thank you\" and hung up.

## 2018-10-08 ENCOUNTER — TELEPHONE (OUTPATIENT)
Dept: FAMILY MEDICINE CLINIC | Age: 68
End: 2018-10-08

## 2018-10-08 DIAGNOSIS — M54.31 SCIATICA OF RIGHT SIDE: Primary | ICD-10-CM

## 2018-10-08 LAB
BACTERIA SPEC CULT: ABNORMAL
BACTERIA SPEC CULT: ABNORMAL
SERVICE CMNT-IMP: ABNORMAL

## 2018-10-08 RX ORDER — PREDNISONE 10 MG/1
TABLET ORAL
Qty: 21 TAB | Refills: 0 | Status: SHIPPED | OUTPATIENT
Start: 2018-10-08 | End: 2019-05-16

## 2018-10-08 RX ORDER — GABAPENTIN 100 MG/1
100 CAPSULE ORAL 3 TIMES DAILY
Qty: 90 CAP | Refills: 0 | Status: SHIPPED | OUTPATIENT
Start: 2018-10-08 | End: 2018-10-22 | Stop reason: DRUGHIGH

## 2018-10-08 NOTE — TELEPHONE ENCOUNTER
Patient stated that she was just in the ER due to  UTI infection, but she is currently having a severe leg pain. Patient declined to make an appointment when offered, stating that she cannot walk. Pt is specifically asking for Dr. Stefany Gutierrez to give her a call back and see what she should do.

## 2018-10-08 NOTE — TELEPHONE ENCOUNTER
Called pt and spoke to her. From what she desrcibed it seems like herniated disc with pain radiating into rt leg due to pinched nerve. Sent Gabapentin and prednisone to pharmacy. Asked pt call back if symptoms do not improve.

## 2018-10-10 ENCOUNTER — TELEPHONE (OUTPATIENT)
Dept: FAMILY MEDICINE CLINIC | Age: 68
End: 2018-10-10

## 2018-10-10 DIAGNOSIS — M70.61 TROCHANTERIC BURSITIS OF RIGHT HIP: Primary | ICD-10-CM

## 2018-10-10 RX ORDER — IBUPROFEN 600 MG/1
600 TABLET ORAL
Qty: 30 TAB | Refills: 2 | Status: SHIPPED | OUTPATIENT
Start: 2018-10-10 | End: 2019-03-31 | Stop reason: SDUPTHER

## 2018-10-17 ENCOUNTER — TELEPHONE (OUTPATIENT)
Dept: FAMILY MEDICINE CLINIC | Age: 68
End: 2018-10-17

## 2018-10-17 NOTE — TELEPHONE ENCOUNTER
Patient called stating her leg is still in a lot of pain. Also questioning if she needs to get x-rays before her upcoming appointment on Monday, October 22, 2018 02:15 PM. Also questioning if there is any medication she could be prescribed for this pain before her visit? Patient stated she can barely walk, its her hip down her back to her leg. Please advise, thank you.

## 2018-10-22 ENCOUNTER — HOSPITAL ENCOUNTER (OUTPATIENT)
Dept: GENERAL RADIOLOGY | Age: 68
Discharge: HOME | End: 2018-10-22
Payer: MEDICARE

## 2018-10-22 ENCOUNTER — HOSPITAL ENCOUNTER (OUTPATIENT)
Dept: LAB | Age: 68
Discharge: HOME | End: 2018-10-22
Payer: MEDICARE

## 2018-10-22 ENCOUNTER — OFFICE VISIT (OUTPATIENT)
Dept: FAMILY MEDICINE CLINIC | Age: 68
End: 2018-10-22

## 2018-10-22 VITALS
BODY MASS INDEX: 54.77 KG/M2 | SYSTOLIC BLOOD PRESSURE: 142 MMHG | TEMPERATURE: 98 F | WEIGHT: 279 LBS | HEIGHT: 60 IN | OXYGEN SATURATION: 98 % | DIASTOLIC BLOOD PRESSURE: 82 MMHG | HEART RATE: 67 BPM | RESPIRATION RATE: 18 BRPM

## 2018-10-22 DIAGNOSIS — N39.0 URINARY TRACT INFECTION WITHOUT HEMATURIA, SITE UNSPECIFIED: ICD-10-CM

## 2018-10-22 DIAGNOSIS — M25.551 PAIN OF RIGHT HIP JOINT: ICD-10-CM

## 2018-10-22 DIAGNOSIS — G89.29 OTHER CHRONIC BACK PAIN: ICD-10-CM

## 2018-10-22 DIAGNOSIS — M25.551 PAIN OF RIGHT HIP JOINT: Primary | ICD-10-CM

## 2018-10-22 DIAGNOSIS — M54.9 OTHER CHRONIC BACK PAIN: ICD-10-CM

## 2018-10-22 LAB
APPEARANCE UR: ABNORMAL
BACTERIA URNS QL MICRO: ABNORMAL /HPF
BILIRUB UR QL: NEGATIVE
COLOR UR: ABNORMAL
EPITH CASTS URNS QL MICRO: ABNORMAL /LPF (ref 0–5)
ERYTHROCYTE [SEDIMENTATION RATE] IN BLOOD: 59 MM/HR (ref 0–30)
GLUCOSE UR STRIP.AUTO-MCNC: NEGATIVE MG/DL
HGB UR QL STRIP: ABNORMAL
KETONES UR QL STRIP.AUTO: NEGATIVE MG/DL
LEUKOCYTE ESTERASE UR QL STRIP.AUTO: ABNORMAL
NITRITE UR QL STRIP.AUTO: POSITIVE
PH UR STRIP: 6 [PH] (ref 5–8)
PROT UR STRIP-MCNC: 30 MG/DL
RBC #/AREA URNS HPF: ABNORMAL /HPF (ref 0–5)
SP GR UR REFRACTOMETRY: 1.02 (ref 1–1.03)
UROBILINOGEN UR QL STRIP.AUTO: 1 EU/DL (ref 0.2–1)
WBC URNS QL MICRO: ABNORMAL /HPF (ref 0–4)

## 2018-10-22 PROCEDURE — 87186 SC STD MICRODIL/AGAR DIL: CPT | Performed by: INTERNAL MEDICINE

## 2018-10-22 PROCEDURE — 73502 X-RAY EXAM HIP UNI 2-3 VIEWS: CPT

## 2018-10-22 PROCEDURE — 85652 RBC SED RATE AUTOMATED: CPT | Performed by: INTERNAL MEDICINE

## 2018-10-22 PROCEDURE — 81001 URINALYSIS AUTO W/SCOPE: CPT | Performed by: INTERNAL MEDICINE

## 2018-10-22 PROCEDURE — 87086 URINE CULTURE/COLONY COUNT: CPT | Performed by: INTERNAL MEDICINE

## 2018-10-22 PROCEDURE — 87077 CULTURE AEROBIC IDENTIFY: CPT | Performed by: INTERNAL MEDICINE

## 2018-10-22 RX ORDER — HYDROCODONE BITARTRATE AND ACETAMINOPHEN 5; 325 MG/1; MG/1
1 TABLET ORAL
Qty: 15 TAB | Refills: 0 | Status: SHIPPED | OUTPATIENT
Start: 2018-10-22 | End: 2018-11-06 | Stop reason: SDUPTHER

## 2018-10-22 RX ORDER — GABAPENTIN 300 MG/1
300 CAPSULE ORAL 2 TIMES DAILY
Qty: 60 CAP | Refills: 3 | Status: SHIPPED | OUTPATIENT
Start: 2018-10-22 | End: 2018-12-06

## 2018-10-25 ENCOUNTER — TELEPHONE (OUTPATIENT)
Dept: FAMILY MEDICINE CLINIC | Age: 68
End: 2018-10-25

## 2018-10-25 DIAGNOSIS — N39.0 URINARY TRACT INFECTION WITHOUT HEMATURIA, SITE UNSPECIFIED: Primary | ICD-10-CM

## 2018-10-25 LAB
BACTERIA SPEC CULT: ABNORMAL
BACTERIA SPEC CULT: ABNORMAL
SERVICE CMNT-IMP: ABNORMAL

## 2018-10-25 RX ORDER — CIPROFLOXACIN 500 MG/1
500 TABLET ORAL 2 TIMES DAILY
Qty: 14 TAB | Refills: 0 | Status: SHIPPED | OUTPATIENT
Start: 2018-10-25 | End: 2018-11-01

## 2018-10-25 NOTE — TELEPHONE ENCOUNTER
Spoke with patient, informed her of message below and that medication was sent to pharmacy . She verbalized understanding.

## 2018-11-05 ENCOUNTER — TELEPHONE (OUTPATIENT)
Dept: FAMILY MEDICINE CLINIC | Age: 68
End: 2018-11-05

## 2018-11-05 NOTE — TELEPHONE ENCOUNTER
Patient called requesting a phone call back. She stated she is in a lot of pain. Would not tell me anything else. Please advise, thank you.

## 2018-11-06 ENCOUNTER — OFFICE VISIT (OUTPATIENT)
Dept: FAMILY MEDICINE CLINIC | Age: 68
End: 2018-11-06

## 2018-11-06 VITALS
HEIGHT: 60 IN | WEIGHT: 278 LBS | RESPIRATION RATE: 16 BRPM | BODY MASS INDEX: 54.58 KG/M2 | DIASTOLIC BLOOD PRESSURE: 64 MMHG | TEMPERATURE: 96.8 F | OXYGEN SATURATION: 98 % | SYSTOLIC BLOOD PRESSURE: 153 MMHG

## 2018-11-06 DIAGNOSIS — Z23 ENCOUNTER FOR IMMUNIZATION: ICD-10-CM

## 2018-11-06 DIAGNOSIS — M54.9 OTHER CHRONIC BACK PAIN: ICD-10-CM

## 2018-11-06 DIAGNOSIS — M25.551 PAIN OF RIGHT HIP JOINT: ICD-10-CM

## 2018-11-06 DIAGNOSIS — G89.29 OTHER CHRONIC BACK PAIN: ICD-10-CM

## 2018-11-06 RX ORDER — NALOXONE HYDROCHLORIDE 4 MG/.1ML
SPRAY NASAL
Qty: 1 EACH | Refills: 2 | Status: SHIPPED | OUTPATIENT
Start: 2018-11-06 | End: 2019-05-16

## 2018-11-06 RX ORDER — HYDROCODONE BITARTRATE AND ACETAMINOPHEN 5; 325 MG/1; MG/1
1 TABLET ORAL
Qty: 40 TAB | Refills: 0 | Status: SHIPPED | OUTPATIENT
Start: 2018-11-06 | End: 2018-11-27 | Stop reason: SDUPTHER

## 2018-11-06 NOTE — PROGRESS NOTES
Chantelle Fletcher is a 76 y.o.  female and presents with     Chief Complaint   Patient presents with    Hip Pain    Obesity       Pt says she is having lot of pain in her rt hip region. Alisia says she has appt with Dr Dereck Barth but not till December. She says the Sidonie Sand does seem to help her with the pain but it makes her sleepy. However she does not want anything less potent since it does not control the pain. She has tried tylenol # 3 in the past but it did not work. She is barely able to walk inside her house. She uses a walker to ambulate with great difficulty. Past Medical History:   Diagnosis Date    Degenerative disk disease     Depression     Depression     Diffuse idiopathic skeletal hyperostosis     Fracture of leg     Gallstone     Lymphedema of leg 1975    bilateral lower legs    Morbid obesity ([address]     Pulmonary arterial hypertension (HCC)     MANPREET (stress urinary incontinence, female)      Past Surgical History:   Procedure Laterality Date    HX HYSTERECTOMY      HX KNEE REPLACEMENT      HX ORTHOPAEDIC      right shoulder and right knee replaced x 2, left knee replaced as well     Current Outpatient Medications   Medication Sig    HYDROcodone-acetaminophen (NORCO) 5-325 mg per tablet Take 1 Tab by mouth every eight (8) hours as needed for Pain. Max Daily Amount: 3 Tabs.  naloxone (NARCAN) 4 mg/actuation nasal spray Use 1 spray intranasally, then discard. Repeat with new spray every 2 min as needed for opioid overdose symptoms, alternating nostrils.  gabapentin (NEURONTIN) 300 mg capsule Take 1 Cap by mouth two (2) times a day.  ibuprofen (MOTRIN) 600 mg tablet Take 1 Tab by mouth every eight (8) hours as needed for Pain.  predniSONE (STERAPRED DS) 10 mg dose pack See administration instruction per 10mg dose pack    DULoxetine (CYMBALTA) 60 mg capsule Take 1 Cap by mouth daily.  atorvastatin (LIPITOR) 10 mg tablet Take 1 Tab by mouth daily.     potassium chloride SR (KLOR-CON 10) 10 mEq tablet TAKE 1 TABLET BY MOUTH ONCE DAILY    oxybutynin (DITROPAN) 5 mg tablet TAKE 1 TABLET BY MOUTH ONCE DAILY    nystatin (MYCOSTATIN) powder Apply to rash 4 times a day    pantoprazole (PROTONIX) 40 mg tablet TAKE ONE TABLET BY MOUTH ONCE DAILY    clotrimazole (LOTRIMIN) 1 % topical cream Apply  to affected area two (2) times a day.  Miconazole powd Apply twice daily on the left leg    hydrocortisone (HYCORT) 1 % ointment Apply  to affected area two (2) times a day. use thin layer    alendronate (FOSAMAX) 70 mg tablet Take 1 Tab by mouth every seven (7) days.  metroNIDAZOLE (NORITATE) 1 % topical cream Apply  to affected area two (2) times daily (with meals). Use a thin layer to affected areas after washing    metFORMIN (GLUCOPHAGE) 500 mg tablet TAKE ONE TABLET BY MOUTH TWICE DAILY WITH MEALS    losartan (COZAAR) 25 mg tablet TAKE ONE TABLET BY MOUTH ONCE DAILY    furosemide (LASIX) 40 mg tablet One po daily    ergocalciferol (ERGOCALCIFEROL) 50,000 unit capsule Take 1 Cap by mouth every seven (7) days.  metFORMIN (GLUCOPHAGE) 1,000 mg tablet Take 1 Tab by mouth two (2) times daily (with meals).  albuterol (PROVENTIL HFA, VENTOLIN HFA, PROAIR HFA) 90 mcg/actuation inhaler Take 2 Puffs by inhalation every six (6) hours as needed for Wheezing. No current facility-administered medications for this visit.       Health Maintenance   Topic Date Due    Shingrix Vaccine Age 49> (1 of 2) 01/31/2000    GLAUCOMA SCREENING Q2Y  07/09/2017    Influenza Age 5 to Adult  08/01/2018    MEDICARE YEARLY EXAM  08/28/2019    BREAST CANCER SCRN MAMMOGRAM  01/02/2020    COLONOSCOPY  07/25/2023    DTaP/Tdap/Td series (2 - Td) 07/09/2025    Hepatitis C Screening  Completed    Bone Densitometry (Dexa) Screening  Completed    Pneumococcal 65+ Low/Medium Risk  Completed     Immunization History   Administered Date(s) Administered    Influenza High Dose Vaccine PF 10/19/2015, 08/12/2016    Influenza Vaccine 09/19/2017    Pneumococcal Conjugate (PCV-13) 08/07/2017    Pneumococcal Polysaccharide (PPSV-23) 07/09/2015, 07/26/2016    Tdap 07/09/2015     No LMP recorded. Patient has had a hysterectomy. Allergies and Intolerances: Allergies   Allergen Reactions    Adhesive Other (comments)     Skin burns - red spots    Lisinopril Other (comments)    Nitrofurantoin Other (comments)       Family History:   Family History   Problem Relation Age of Onset    Heart Disease Father     Diabetes Father        Social History:   She  reports that  has never smoked. she has never used smokeless tobacco.  She  reports that she does not drink alcohol.             Review of Systems: pos for rt hip pain  General: negative for - chills, fatigue, fever, weight change  Psych: negative for - anxiety, depression, irritability or mood swings  ENT: negative for - headaches, hearing change, nasal congestion, oral lesions, sneezing or sore throat  Heme/ Lymph: negative for - bleeding problems, bruising, pallor or swollen lymph nodes  Endo: negative for - hot flashes, polydipsia/polyuria or temperature intolerance  Resp: negative for - cough, shortness of breath or wheezing  CV: negative for - chest pain, edema or palpitations  GI: negative for - abdominal pain, change in bowel habits, constipation, diarrhea or nausea/vomiting  : negative for - dysuria, hematuria, incontinence, pelvic pain or vulvar/vaginal symptoms  MSK: negative for - joint pain, joint swelling or muscle pain  Neuro: negative for - confusion, headaches, seizures or weakness  Derm: negative for - dry skin, hair changes, rash or skin lesion changes          Physical:   Vitals:   Vitals:    11/06/18 1121   BP: 153/64   Resp: 16   Temp: 96.8 °F (36 °C)   TempSrc: Oral   SpO2: 98%   Weight: 278 lb (126.1 kg)   Height: 5' (1.524 m)           Exam:   HEENT- atraumatic,normocephalic, awake, oriented, well nourished  Neck - supple,no enlarged lymph nodes, no JVD, no thyromegaly  Chest- CTA, no rhonchi, no crackles  Heart- rrr, no murmurs / gallop/rub  Abdomen- soft,BS+,NT, no hepatosplenomegaly  Ext - no c/c/edema   Neuro- no focal deficits. Power 5/5 all extremities  Skin - warm,dry, no obvious rashes. Walks with limp, uses a walker        Review of Data:   LABS:   Lab Results   Component Value Date/Time    WBC 5.5 10/05/2018 11:05 AM    HGB 11.7 (L) 10/05/2018 11:05 AM    HCT 35.8 10/05/2018 11:05 AM    PLATELET 729 93/56/0676 11:05 AM     Lab Results   Component Value Date/Time    Sodium 143 10/05/2018 11:05 AM    Potassium 4.5 10/05/2018 11:05 AM    Chloride 111 (H) 10/05/2018 11:05 AM    CO2 26 10/05/2018 11:05 AM    Glucose 84 10/05/2018 11:05 AM    BUN 9 10/05/2018 11:05 AM    Creatinine 0.62 10/05/2018 11:05 AM     Lab Results   Component Value Date/Time    Cholesterol, total 166 05/07/2018 04:06 AM    HDL Cholesterol 73 05/07/2018 04:06 AM    LDL, calculated 71 05/07/2018 04:06 AM    Triglyceride 109 05/07/2018 04:06 AM     No results found for: GPT        Impression / Plan:        ICD-10-CM ICD-9-CM    1. Other chronic back pain M54.9 724.5 HYDROcodone-acetaminophen (NORCO) 5-325 mg per tablet    G89.29 338.29    2. Pain of right hip joint M25.551 719.45 HYDROcodone-acetaminophen (NORCO) 5-325 mg per tablet      naloxone (NARCAN) 4 mg/actuation nasal spray         Keep appt with OrthoDr Daryl. Explained to patient risk benefits of the medications. Advised patient to stop meds if having any side effects. Pt verbalized understanding of the instructions. I have discussed the diagnosis with the patient and the intended plan as seen in the above orders. The patient has received an after-visit summary and questions were answered concerning future plans. I have discussed medication side effects and warnings with the patient as well.  I have reviewed the plan of care with the patient, accepted their input and they are in agreement with the treatment goals. Reviewed plan of care. Patient has provided input and agrees with goals.     Follow-up Disposition: Not on Roge Harrison MD

## 2018-11-06 NOTE — PROGRESS NOTES
After obtaining consent, and per orders of Dr. Shalini Hwang, injection of Fluad 0.5 mg given by Bharat Luu LPN. Patient instructed to remain in clinic for 20 minutes afterwards, and to report any adverse reaction to me immediately.

## 2018-11-27 ENCOUNTER — OFFICE VISIT (OUTPATIENT)
Dept: FAMILY MEDICINE CLINIC | Age: 68
End: 2018-11-27

## 2018-11-27 ENCOUNTER — HOSPITAL ENCOUNTER (OUTPATIENT)
Dept: LAB | Age: 68
Discharge: HOME | End: 2018-11-27
Payer: MEDICARE

## 2018-11-27 VITALS
BODY MASS INDEX: 52.81 KG/M2 | SYSTOLIC BLOOD PRESSURE: 137 MMHG | RESPIRATION RATE: 15 BRPM | TEMPERATURE: 97.8 F | OXYGEN SATURATION: 98 % | HEART RATE: 63 BPM | HEIGHT: 60 IN | DIASTOLIC BLOOD PRESSURE: 60 MMHG | WEIGHT: 269 LBS

## 2018-11-27 DIAGNOSIS — G89.29 OTHER CHRONIC BACK PAIN: ICD-10-CM

## 2018-11-27 DIAGNOSIS — R63.0 LOSS OF APPETITE: ICD-10-CM

## 2018-11-27 DIAGNOSIS — R73.01 IFG (IMPAIRED FASTING GLUCOSE): ICD-10-CM

## 2018-11-27 DIAGNOSIS — R63.4 WEIGHT LOSS: ICD-10-CM

## 2018-11-27 DIAGNOSIS — M54.9 OTHER CHRONIC BACK PAIN: ICD-10-CM

## 2018-11-27 DIAGNOSIS — M25.551 PAIN OF RIGHT HIP JOINT: Primary | ICD-10-CM

## 2018-11-27 LAB
BASOPHILS # BLD: 0 K/UL (ref 0–0.1)
BASOPHILS NFR BLD: 0 % (ref 0–2)
DIFFERENTIAL METHOD BLD: ABNORMAL
EOSINOPHIL # BLD: 0.1 K/UL (ref 0–0.4)
EOSINOPHIL NFR BLD: 1 % (ref 0–5)
ERYTHROCYTE [DISTWIDTH] IN BLOOD BY AUTOMATED COUNT: 13.6 % (ref 11.6–14.5)
EST. AVERAGE GLUCOSE BLD GHB EST-MCNC: 117 MG/DL
HBA1C MFR BLD: 5.7 % (ref 4.2–5.6)
HCT VFR BLD AUTO: 36.7 % (ref 35–45)
HGB BLD-MCNC: 11.5 G/DL (ref 12–16)
LYMPHOCYTES # BLD: 1.4 K/UL (ref 0.9–3.6)
LYMPHOCYTES NFR BLD: 19 % (ref 21–52)
MCH RBC QN AUTO: 28.6 PG (ref 24–34)
MCHC RBC AUTO-ENTMCNC: 31.3 G/DL (ref 31–37)
MCV RBC AUTO: 91.3 FL (ref 74–97)
MONOCYTES # BLD: 0.9 K/UL (ref 0.05–1.2)
MONOCYTES NFR BLD: 13 % (ref 3–10)
NEUTS SEG # BLD: 4.9 K/UL (ref 1.8–8)
NEUTS SEG NFR BLD: 67 % (ref 40–73)
PLATELET # BLD AUTO: 283 K/UL (ref 135–420)
PMV BLD AUTO: 10.4 FL (ref 9.2–11.8)
RBC # BLD AUTO: 4.02 M/UL (ref 4.2–5.3)
WBC # BLD AUTO: 7.3 K/UL (ref 4.6–13.2)

## 2018-11-27 PROCEDURE — 84443 ASSAY THYROID STIM HORMONE: CPT

## 2018-11-27 PROCEDURE — 36415 COLL VENOUS BLD VENIPUNCTURE: CPT

## 2018-11-27 PROCEDURE — 85025 COMPLETE CBC W/AUTO DIFF WBC: CPT

## 2018-11-27 PROCEDURE — 83036 HEMOGLOBIN GLYCOSYLATED A1C: CPT

## 2018-11-27 PROCEDURE — 80053 COMPREHEN METABOLIC PANEL: CPT

## 2018-11-27 RX ORDER — HYDROCODONE BITARTRATE AND ACETAMINOPHEN 5; 325 MG/1; MG/1
1 TABLET ORAL
Qty: 30 TAB | Refills: 0 | Status: SHIPPED | OUTPATIENT
Start: 2018-11-27 | End: 2019-05-16 | Stop reason: ALTCHOICE

## 2018-11-27 NOTE — PROGRESS NOTES
Vilma Ludwig is a 76 y.o.  female and presents with     Chief Complaint   Patient presents with    Hip Pain    Weight Loss       Pt has been having hip pain. She says the pain is slightly better than it was a month ago but she still needs to take pain med to help her ambulate. She saw ortho and was informed that she does not need surgery. She was referred to PT but no one has called her. Pt has lost about 26 pounds since May. She thinks that is is due to her  passing away. Pt does not have great appetite. She denies cough, SOB, melena, rectal bleeding. Past Medical History:   Diagnosis Date    Degenerative disk disease     Depression     Depression     Diffuse idiopathic skeletal hyperostosis     Fracture of leg     Gallstone     Lymphedema of leg 1975    bilateral lower legs    Morbid obesity ([address]     Pulmonary arterial hypertension (HCC)     MANPREET (stress urinary incontinence, female)      Past Surgical History:   Procedure Laterality Date    HX HYSTERECTOMY      HX KNEE REPLACEMENT      HX ORTHOPAEDIC      right shoulder and right knee replaced x 2, left knee replaced as well     Current Outpatient Medications   Medication Sig    HYDROcodone-acetaminophen (NORCO) 5-325 mg per tablet Take 1 Tab by mouth every eight (8) hours as needed for Pain. Max Daily Amount: 3 Tabs.  naloxone (NARCAN) 4 mg/actuation nasal spray Use 1 spray intranasally, then discard. Repeat with new spray every 2 min as needed for opioid overdose symptoms, alternating nostrils.  gabapentin (NEURONTIN) 300 mg capsule Take 1 Cap by mouth two (2) times a day.  ibuprofen (MOTRIN) 600 mg tablet Take 1 Tab by mouth every eight (8) hours as needed for Pain.  predniSONE (STERAPRED DS) 10 mg dose pack See administration instruction per 10mg dose pack    DULoxetine (CYMBALTA) 60 mg capsule Take 1 Cap by mouth daily.  atorvastatin (LIPITOR) 10 mg tablet Take 1 Tab by mouth daily.     potassium chloride SR (KLOR-CON 10) 10 mEq tablet TAKE 1 TABLET BY MOUTH ONCE DAILY    oxybutynin (DITROPAN) 5 mg tablet TAKE 1 TABLET BY MOUTH ONCE DAILY    nystatin (MYCOSTATIN) powder Apply to rash 4 times a day    pantoprazole (PROTONIX) 40 mg tablet TAKE ONE TABLET BY MOUTH ONCE DAILY    clotrimazole (LOTRIMIN) 1 % topical cream Apply  to affected area two (2) times a day.  hydrocortisone (HYCORT) 1 % ointment Apply  to affected area two (2) times a day. use thin layer    alendronate (FOSAMAX) 70 mg tablet Take 1 Tab by mouth every seven (7) days.  losartan (COZAAR) 25 mg tablet TAKE ONE TABLET BY MOUTH ONCE DAILY    furosemide (LASIX) 40 mg tablet One po daily    ergocalciferol (ERGOCALCIFEROL) 50,000 unit capsule Take 1 Cap by mouth every seven (7) days.  Miconazole powd Apply twice daily on the left leg    metroNIDAZOLE (NORITATE) 1 % topical cream Apply  to affected area two (2) times daily (with meals). Use a thin layer to affected areas after washing    metFORMIN (GLUCOPHAGE) 1,000 mg tablet Take 1 Tab by mouth two (2) times daily (with meals).  albuterol (PROVENTIL HFA, VENTOLIN HFA, PROAIR HFA) 90 mcg/actuation inhaler Take 2 Puffs by inhalation every six (6) hours as needed for Wheezing. No current facility-administered medications for this visit.       Health Maintenance   Topic Date Due    Shingrix Vaccine Age 49> (1 of 2) 01/31/2000    GLAUCOMA SCREENING Q2Y  07/09/2017    MEDICARE YEARLY EXAM  08/28/2019    BREAST CANCER SCRN MAMMOGRAM  01/02/2020    COLONOSCOPY  07/25/2023    DTaP/Tdap/Td series (2 - Td) 07/09/2025    Hepatitis C Screening  Completed    Bone Densitometry (Dexa) Screening  Completed    Pneumococcal 65+ Low/Medium Risk  Completed    Influenza Age 5 to Adult  Completed     Immunization History   Administered Date(s) Administered    Influenza High Dose Vaccine PF 10/19/2015, 08/12/2016    Influenza Vaccine 09/19/2017    Influenza Vaccine (Tri) Adjuvanted 11/06/2018    Pneumococcal Conjugate (PCV-13) 08/07/2017    Pneumococcal Polysaccharide (PPSV-23) 07/09/2015, 07/26/2016    Tdap 07/09/2015     No LMP recorded. Patient has had a hysterectomy. Allergies and Intolerances: Allergies   Allergen Reactions    Adhesive Other (comments)     Skin burns - red spots    Lisinopril Other (comments)    Nitrofurantoin Other (comments)       Family History:   Family History   Problem Relation Age of Onset    Heart Disease Father     Diabetes Father        Social History:   She  reports that  has never smoked. she has never used smokeless tobacco.  She  reports that she does not drink alcohol.             Review of Systems:   General: negative for - chills, fatigue, fever, pos for weight change  Psych: negative for - anxiety, depression, irritability or mood swings  ENT: negative for - headaches, hearing change, nasal congestion, oral lesions, sneezing or sore throat  Heme/ Lymph: negative for - bleeding problems, bruising, pallor or swollen lymph nodes  Endo: negative for - hot flashes, polydipsia/polyuria or temperature intolerance  Resp: negative for - cough, shortness of breath or wheezing  CV: negative for - chest pain, edema or palpitations  GI: negative for - abdominal pain, change in bowel habits, constipation, diarrhea or nausea/vomiting  : negative for - dysuria, hematuria, incontinence, pelvic pain or vulvar/vaginal symptoms  MSK: negative for - joint pain, joint swelling or muscle pain, pos for rt hip pain  Neuro: negative for - confusion, headaches, seizures or weakness  Derm: negative for - dry skin, hair changes, rash or skin lesion changes          Physical:   Vitals:   Vitals:    11/27/18 1338   BP: 137/60   Pulse: 63   Resp: 15   Temp: 97.8 °F (36.6 °C)   TempSrc: Oral   SpO2: 98%   Weight: 269 lb (122 kg)   Height: 5' (1.524 m)           Exam:   HEENT- atraumatic,normocephalic, awake, oriented, well nourished  Neck - supple,no enlarged lymph nodes, no JVD, no thyromegaly  Chest- CTA, no rhonchi, no crackles  Heart- rrr, no murmurs / gallop/rub  Abdomen- soft,BS+,NT, no hepatosplenomegaly  Ext - no c/c/edema   Neuro- no focal deficits. Power 5/5 all extremities, uses a walker. Skin - warm,dry, no obvious rashes. Review of Data:   LABS:   Lab Results   Component Value Date/Time    WBC 5.5 10/05/2018 11:05 AM    HGB 11.7 (L) 10/05/2018 11:05 AM    HCT 35.8 10/05/2018 11:05 AM    PLATELET 964 66/72/6708 11:05 AM     Lab Results   Component Value Date/Time    Sodium 143 10/05/2018 11:05 AM    Potassium 4.5 10/05/2018 11:05 AM    Chloride 111 (H) 10/05/2018 11:05 AM    CO2 26 10/05/2018 11:05 AM    Glucose 84 10/05/2018 11:05 AM    BUN 9 10/05/2018 11:05 AM    Creatinine 0.62 10/05/2018 11:05 AM     Lab Results   Component Value Date/Time    Cholesterol, total 166 05/07/2018 04:06 AM    HDL Cholesterol 73 05/07/2018 04:06 AM    LDL, calculated 71 05/07/2018 04:06 AM    Triglyceride 109 05/07/2018 04:06 AM     No results found for: GPT        Impression / Plan:        ICD-10-CM ICD-9-CM    1. Pain of right hip joint M25.551 719.45 REFERRAL TO PHYSICAL THERAPY      HYDROcodone-acetaminophen (NORCO) 5-325 mg per tablet   2. Other chronic back pain M54.9 724.5 REFERRAL TO PHYSICAL THERAPY    G89.29 338.29 HYDROcodone-acetaminophen (NORCO) 5-325 mg per tablet   3. Weight loss R63.4 783.21 CBC WITH AUTOMATED DIFF      TSH 3RD GENERATION      METABOLIC PANEL, COMPREHENSIVE      HEMOGLOBIN A1C WITH EAG   4. Loss of appetite R63.0 783.0 CBC WITH AUTOMATED DIFF      TSH 3RD GENERATION      METABOLIC PANEL, COMPREHENSIVE      HEMOGLOBIN A1C WITH EAG   5. IFG (impaired fasting glucose) R73.01 790.21 HEMOGLOBIN A1C WITH EAG           Wt loss 27 pounds since May 2018, since her husbands death. Explained to patient risk benefits of the medications. Advised patient to stop meds if having any side effects. Pt verbalized understanding of the instructions.     I have discussed the diagnosis with the patient and the intended plan as seen in the above orders. The patient has received an after-visit summary and questions were answered concerning future plans. I have discussed medication side effects and warnings with the patient as well. I have reviewed the plan of care with the patient, accepted their input and they are in agreement with the treatment goals. Reviewed plan of care. Patient has provided input and agrees with goals. Follow-up Disposition:  Return in about 2 months (around 1/27/2019).     Aarti Darden MD

## 2018-11-28 LAB
ALBUMIN SERPL-MCNC: 3.6 G/DL (ref 3.4–5)
ALBUMIN/GLOB SERPL: 1 {RATIO} (ref 0.8–1.7)
ALP SERPL-CCNC: 86 U/L (ref 45–117)
ALT SERPL-CCNC: 13 U/L (ref 13–56)
ANION GAP SERPL CALC-SCNC: 7 MMOL/L (ref 3–18)
AST SERPL-CCNC: 11 U/L (ref 15–37)
BILIRUB SERPL-MCNC: 1.6 MG/DL (ref 0.2–1)
BUN SERPL-MCNC: 12 MG/DL (ref 7–18)
BUN/CREAT SERPL: 14 (ref 12–20)
CALCIUM SERPL-MCNC: 9.2 MG/DL (ref 8.5–10.1)
CHLORIDE SERPL-SCNC: 107 MMOL/L (ref 100–108)
CO2 SERPL-SCNC: 28 MMOL/L (ref 21–32)
CREAT SERPL-MCNC: 0.87 MG/DL (ref 0.6–1.3)
GLOBULIN SER CALC-MCNC: 3.5 G/DL (ref 2–4)
GLUCOSE SERPL-MCNC: 103 MG/DL (ref 74–99)
POTASSIUM SERPL-SCNC: 3.5 MMOL/L (ref 3.5–5.5)
PROT SERPL-MCNC: 7.1 G/DL (ref 6.4–8.2)
SODIUM SERPL-SCNC: 142 MMOL/L (ref 136–145)
TSH SERPL DL<=0.05 MIU/L-ACNC: 1.3 UIU/ML (ref 0.36–3.74)

## 2018-12-04 ENCOUNTER — APPOINTMENT (OUTPATIENT)
Dept: PHYSICAL THERAPY | Age: 68
End: 2018-12-04
Payer: MEDICARE

## 2018-12-06 ENCOUNTER — OFFICE VISIT (OUTPATIENT)
Dept: FAMILY MEDICINE CLINIC | Age: 68
End: 2018-12-06

## 2018-12-06 ENCOUNTER — HOSPITAL ENCOUNTER (OUTPATIENT)
Dept: PHYSICAL THERAPY | Age: 68
Discharge: HOME | End: 2018-12-06
Payer: MEDICARE

## 2018-12-06 VITALS
DIASTOLIC BLOOD PRESSURE: 67 MMHG | WEIGHT: 272 LBS | SYSTOLIC BLOOD PRESSURE: 135 MMHG | HEART RATE: 67 BPM | OXYGEN SATURATION: 98 % | HEIGHT: 60 IN | RESPIRATION RATE: 18 BRPM | TEMPERATURE: 98 F | BODY MASS INDEX: 53.4 KG/M2

## 2018-12-06 DIAGNOSIS — M79.604 PAIN OF RIGHT LOWER EXTREMITY: ICD-10-CM

## 2018-12-06 DIAGNOSIS — F41.9 ANXIETY: Primary | ICD-10-CM

## 2018-12-06 PROCEDURE — 97110 THERAPEUTIC EXERCISES: CPT

## 2018-12-06 PROCEDURE — G8979 MOBILITY GOAL STATUS: HCPCS

## 2018-12-06 PROCEDURE — 97162 PT EVAL MOD COMPLEX 30 MIN: CPT

## 2018-12-06 PROCEDURE — G8978 MOBILITY CURRENT STATUS: HCPCS

## 2018-12-06 RX ORDER — CEPHALEXIN 500 MG/1
500 CAPSULE ORAL 4 TIMES DAILY
Qty: 40 CAP | Refills: 0 | Status: SHIPPED | OUTPATIENT
Start: 2018-12-06 | End: 2018-12-16

## 2018-12-06 RX ORDER — LORAZEPAM 0.5 MG/1
0.5 TABLET ORAL
Qty: 30 TAB | Refills: 0 | Status: SHIPPED | OUTPATIENT
Start: 2018-12-06 | End: 2019-02-19 | Stop reason: ALTCHOICE

## 2018-12-06 RX ORDER — LIDOCAINE 40 MG/G
CREAM TOPICAL
Qty: 45 G | Refills: 3 | Status: SHIPPED | OUTPATIENT
Start: 2018-12-06 | End: 2019-05-16
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
## 2018-12-06 NOTE — PROGRESS NOTES
1. Have you been to the ER, urgent care clinic since your last visit? Hospitalized since your last visit? No    2. Have you seen or consulted any other health care providers outside of the [address] [place] [address] since your last visit? Include any pap smears or colon screening.  No

## 2018-12-06 NOTE — PROGRESS NOTES
[facility] PHYSICAL THERAPY  [address] [place] [address] [address], [address] - Phone: [phone]  Fax: [phone] / [facility] [facility]  Patient Name: Jim Garland : 1950   Medical   Diagnosis: Lower back pain [M54.5]  Right hip pain [M25.551] Treatment Diagnosis: Chronic LBP and right radicular pain   Onset Date: 2018     Referral Source: Chiquis Skinner MD Start of Care [facility] [place]): 2018   Prior Hospitalization: See medical history Provider #: [id number]   Prior Level of Function: Ambulating with SPC   Comorbidities: anxiety, arthritis, back pain, depression, incontinence, osteoporosis, B TKA's   Medications: Verified on Patient Summary List   The Plan of Care and following information is based on the information from the initial evaluation.   ===========================================================================================  Assessment / key information:  Patient is a 76y.o. year old female with chief complaint of right radicular pain. Patient reports she went to see MD in October due to a decrease in functional mobility (\"I noticed I was moving slowly. \"). States she had severe pain in her right LE the next day and was unable to WB on right LE due to pain. Functional limitations: 1) ambulating with rollator due to right LE pain, 2) pain interrupting sleep, 3) pain and difficulty with bed mobility. Objective findings: 1) decreased strength in right LE, 2) (+) SLR test on right LE, 3) lymphedema in B LE's, 4) good response to repeated trunk extension exercises - patient able to abolish right LE symptoms and LBP with prone of elbows positioning. Educated patient on proper positioning at home to abolish symptoms. Patient with a Functional Status score of 34/100 on FOTO (Focused on Therapeutic Outcomes), which corresponds to a functional limitation of 56%.   Patient will benefit from skilled physical therapy services to address these issues.  ===========================================================================================  Eval Complexity: History: HIGH Complexity :3+ comorbidities / personal factors will impact the outcome/ POC Exam:MEDIUM Complexity : 3 Standardized tests and measures addressing body structure, function, activity limitation and / or participation in recreation  Presentation: MEDIUM Complexity : Evolving with changing characteristics  Clinical Decision Making:MEDIUM Complexity : FOTO score of 26-74Overall Complexity:MEDIUM    Problem List: pain affecting function, decrease ROM, decrease strength, impaired gait/ balance, decrease ADL/ functional abilitiies, decrease activity tolerance, decrease flexibility/ joint mobility and decrease transfer abilities   Treatment Plan may include any combination of the following: Therapeutic exercise, Therapeutic activities, Neuromuscular re-education, Physical agent/modality, Gait/balance training, Manual therapy and Patient education  Patient / Family readiness to learn indicated by: asking questions, trying to perform skills and interest  Persons(s) to be included in education: patient (P)  Barriers to Learning/Limitations: None  Measures taken:    Patient Goal (s): \"pain free\"   Patient self reported health status: fair  Rehabilitation Potential: good   Short Term Goals: To be accomplished in  2  weeks:  1. Patient will be compliant with home exercise program.     Long Term Goals: To be accomplished in  4  weeks:  1. Patient will increase FOTO Functional Status score to 52/100 to indicate decreased functional limitations. 2.  Patient will report ability to consistently abolish/prevent right LE symptoms with HEP. 3.  Patient will be independent with home exercise program for self management of symptoms.     Frequency / Duration:   Patient to be seen  1-2  times per week for 4  weeks:  Patient requests frequency of 1x/week due to finances. Patient / Caregiver education and instruction: self care, activity modification and exercises  Patient educated on proper body mechanics with bed mobility, proper sleeping positions, and proper posture with sitting. G-Codes (GP): Mobility [id number] Current  CL= 60-79%  [id number] Goal  CK= 40-59%. The severity rating is based on the FOTO Score    Therapist Signature: Shirin Altamirano PT Date: 92/6/0088   Certification Period: 12/6/2018 - 3/5/2019 Time: 4:09 PM   ===========================================================================================  I certify that the above Physical Therapy Services are being furnished while the patient is under my care. I agree with the treatment plan and certify that this therapy is necessary. Physician Signature:        Date:       Time:     Please sign and return to [facility] or you may fax the signed copy to [phone]. Thank you.

## 2018-12-06 NOTE — PROGRESS NOTES
Es Staley is a 76 y.o.  female and presents with     Chief Complaint   Patient presents with    Leg Swelling     right leg    Anxiety    Hip Pain       Pt says she is having pain bheing her rt lower leg. She denies trauma. She continues to have rt hip pain and wants to get second opinion. Pt has lymphedema in both legs. She had dopplers of her legs that did not reveal DVT  She also feels anxious. She stopped taking Neurontin. She is taking Cymbalta for depression. Pt is sad as her   last year. Past Medical History:   Diagnosis Date    Degenerative disk disease     Depression     Depression     Diffuse idiopathic skeletal hyperostosis     Fracture of leg     Gallstone     Lymphedema of leg 1975    bilateral lower legs    Morbid obesity ([address]     Pulmonary arterial hypertension (HCC)     MANPREET (stress urinary incontinence, female)      Past Surgical History:   Procedure Laterality Date    HX HYSTERECTOMY      HX KNEE REPLACEMENT      HX ORTHOPAEDIC      right shoulder and right knee replaced x 2, left knee replaced as well     Current Outpatient Medications   Medication Sig    cephALEXin (KEFLEX) 500 mg capsule Take 1 Cap by mouth four (4) times daily for 10 days.  LORazepam (ATIVAN) 0.5 mg tablet Take 1 Tab by mouth nightly as needed for Anxiety. Max Daily Amount: 0.5 mg.    lidocaine (XYLOCAINE) 4 % topical cream Apply  to affected area three (3) times daily as needed for Pain.  HYDROcodone-acetaminophen (NORCO) 5-325 mg per tablet Take 1 Tab by mouth every eight (8) hours as needed for Pain. Max Daily Amount: 3 Tabs.  DULoxetine (CYMBALTA) 60 mg capsule Take 1 Cap by mouth daily.  atorvastatin (LIPITOR) 10 mg tablet Take 1 Tab by mouth daily.     oxybutynin (DITROPAN) 5 mg tablet TAKE 1 TABLET BY MOUTH ONCE DAILY    pantoprazole (PROTONIX) 40 mg tablet TAKE ONE TABLET BY MOUTH ONCE DAILY    clotrimazole (LOTRIMIN) 1 % topical cream Apply  to affected area two (2) times a day.  hydrocortisone (HYCORT) 1 % ointment Apply  to affected area two (2) times a day. use thin layer    alendronate (FOSAMAX) 70 mg tablet Take 1 Tab by mouth every seven (7) days.  losartan (COZAAR) 25 mg tablet TAKE ONE TABLET BY MOUTH ONCE DAILY    furosemide (LASIX) 40 mg tablet One po daily    ergocalciferol (ERGOCALCIFEROL) 50,000 unit capsule Take 1 Cap by mouth every seven (7) days.  naloxone (NARCAN) 4 mg/actuation nasal spray Use 1 spray intranasally, then discard. Repeat with new spray every 2 min as needed for opioid overdose symptoms, alternating nostrils.  ibuprofen (MOTRIN) 600 mg tablet Take 1 Tab by mouth every eight (8) hours as needed for Pain.  predniSONE (STERAPRED DS) 10 mg dose pack See administration instruction per 10mg dose pack    potassium chloride SR (KLOR-CON 10) 10 mEq tablet TAKE 1 TABLET BY MOUTH ONCE DAILY    nystatin (MYCOSTATIN) powder Apply to rash 4 times a day    Miconazole powd Apply twice daily on the left leg    metroNIDAZOLE (NORITATE) 1 % topical cream Apply  to affected area two (2) times daily (with meals). Use a thin layer to affected areas after washing    metFORMIN (GLUCOPHAGE) 1,000 mg tablet Take 1 Tab by mouth two (2) times daily (with meals).  albuterol (PROVENTIL HFA, VENTOLIN HFA, PROAIR HFA) 90 mcg/actuation inhaler Take 2 Puffs by inhalation every six (6) hours as needed for Wheezing. No current facility-administered medications for this visit.       Health Maintenance   Topic Date Due    Shingrix Vaccine Age 49> (1 of 2) 01/31/2000    GLAUCOMA SCREENING Q2Y  07/09/2017    MEDICARE YEARLY EXAM  08/28/2019    BREAST CANCER SCRN MAMMOGRAM  01/02/2020    COLONOSCOPY  07/25/2023    DTaP/Tdap/Td series (2 - Td) 07/09/2025    Hepatitis C Screening  Completed    Bone Densitometry (Dexa) Screening  Completed    Pneumococcal 65+ Low/Medium Risk  Completed    Influenza Age 5 to Adult  Completed Immunization History   Administered Date(s) Administered    Influenza High Dose Vaccine PF 10/19/2015, 08/12/2016    Influenza Vaccine 09/19/2017    Influenza Vaccine (Tri) Adjuvanted 11/06/2018    Pneumococcal Conjugate (PCV-13) 08/07/2017    Pneumococcal Polysaccharide (PPSV-23) 07/09/2015, 07/26/2016    Tdap 07/09/2015     No LMP recorded. Patient has had a hysterectomy. Allergies and Intolerances: Allergies   Allergen Reactions    Adhesive Other (comments)     Skin burns - red spots    Lisinopril Other (comments)    Nitrofurantoin Other (comments)       Family History:   Family History   Problem Relation Age of Onset    Heart Disease Father     Diabetes Father        Social History:   She  reports that  has never smoked. she has never used smokeless tobacco.  She  reports that she does not drink alcohol.             Review of Systems: pos for anxiety  General: negative for - chills, fatigue, fever, weight change  Psych: negative for - anxiety, depression, irritability or mood swings  ENT: negative for - headaches, hearing change, nasal congestion, oral lesions, sneezing or sore throat  Heme/ Lymph: negative for - bleeding problems, bruising, pallor or swollen lymph nodes  Endo: negative for - hot flashes, polydipsia/polyuria or temperature intolerance  Resp: negative for - cough, shortness of breath or wheezing  CV: negative for - chest pain, edema or palpitations  GI: negative for - abdominal pain, change in bowel habits, constipation, diarrhea or nausea/vomiting  : negative for - dysuria, hematuria, incontinence, pelvic pain or vulvar/vaginal symptoms  MSK: negative for - joint pain, joint swelling or muscle pain  Neuro: negative for - confusion, headaches, seizures or weakness  Derm: negative for - dry skin, hair changes, rash or skin lesion changes          Physical:   Vitals:   Vitals:    12/06/18 1028   BP: 135/67   Pulse: 67   Resp: 18   Temp: 98 °F (36.7 °C)   TempSrc: Oral SpO2: 98%   Weight: 272 lb (123.4 kg)   Height: 5' (1.524 m)           Exam:   HEENT- atraumatic,normocephalic, awake, oriented, well nourished  Neck - supple,no enlarged lymph nodes, no JVD, no thyromegaly  Chest- CTA, no rhonchi, no crackles  Heart- rrr, no murmurs / gallop/rub  Abdomen- soft,BS+,NT, no hepatosplenomegaly  Ext - no c/c/edema   Neuro- no focal deficits. Power 5/5 all extremities  Skin - warm,dry, no obvious rashes. Review of Data:   LABS:   Lab Results   Component Value Date/Time    WBC 7.3 11/27/2018 02:05 PM    HGB 11.5 (L) 11/27/2018 02:05 PM    HCT 36.7 11/27/2018 02:05 PM    PLATELET 579 41/23/3500 02:05 PM     Lab Results   Component Value Date/Time    Sodium 142 11/27/2018 02:05 PM    Potassium 3.5 11/27/2018 02:05 PM    Chloride 107 11/27/2018 02:05 PM    CO2 28 11/27/2018 02:05 PM    Glucose 103 (H) 11/27/2018 02:05 PM    BUN 12 11/27/2018 02:05 PM    Creatinine 0.87 11/27/2018 02:05 PM     Lab Results   Component Value Date/Time    Cholesterol, total 166 05/07/2018 04:06 AM    HDL Cholesterol 73 05/07/2018 04:06 AM    LDL, calculated 71 05/07/2018 04:06 AM    Triglyceride 109 05/07/2018 04:06 AM     No results found for: GPT        Impression / Plan:        ICD-10-CM ICD-9-CM    1. Anxiety F41.9 300.00 LORazepam (ATIVAN) 0.5 mg tablet   2. Pain of right lower extremity M79.604 729.5 cephALEXin (KEFLEX) 500 mg capsule      REFERRAL TO ORTHOPEDICS      lidocaine (XYLOCAINE) 4 % topical cream       Explained to patient risk benefits of the medications. Advised patient to stop meds if having any side effects. Pt verbalized understanding of the instructions. I have discussed the diagnosis with the patient and the intended plan as seen in the above orders. The patient has received an after-visit summary and questions were answered concerning future plans. I have discussed medication side effects and warnings with the patient as well.  I have reviewed the plan of care with the patient, accepted their input and they are in agreement with the treatment goals. Reviewed plan of care. Patient has provided input and agrees with goals. Follow-up Disposition:  Return if symptoms worsen or fail to improve.     Hussain Cole MD

## 2018-12-10 ENCOUNTER — HOSPITAL ENCOUNTER (OUTPATIENT)
Dept: PHYSICAL THERAPY | Age: 68
Discharge: HOME | End: 2018-12-10
Payer: MEDICARE

## 2018-12-10 PROCEDURE — 97110 THERAPEUTIC EXERCISES: CPT

## 2018-12-10 NOTE — PROGRESS NOTES
PHYSICAL THERAPY - DAILY TREATMENT NOTE    Patient Name: Jaime Brennan        Date: 12/10/2018  : 1950   YES Patient  Verified  Visit #:   2     Insurance: Payor: Regi Monge / Plan: [address] [place] [address] HMO / Product Type: Managed Care Medicare /      In time: 3:03 Out time: 3:44   Total Treatment Time: 41     Medicare/BCBS [place] Time Tracking (below)   Total Timed Codes (min):  41 1:1 Treatment Time:  41     TREATMENT AREA =  LBP/right radicular pain    SUBJECTIVE    Pain Level (on 0 to 10 scale):  6  / 10   Medication Changes/New allergies or changes in medical history, any new surgeries or procedures? NO    If yes, update Summary List   Subjective Functional Status/Changes:  []  No changes reported     \"The exercises take the pain away but it comes back a few minutes after I stand back up. Juan Nicole the pain in my leg was about 8/10. \"  Reports she is doing the exercises 4x/day (not every hour). States she does the exercises \"about 7 am and 9 am but then not again until 4 pm.         OBJECTIVE    41 min Therapeutic Exercise:  [x]  See flow sheet   Rationale:      increase ROM and increase strength to improve the patients ability to perform ADL's, gait, and functional mobility with decreased pain. min Patient Education:  YES  Reviewed HEP   []  Progressed/Changed HEP based on: Added prone glut set/TA draw and H/L hip abd/add to HEP; emphasized importance of compliance with increased frequency of HEP to control symptoms. Other Objective/Functional Measures: Added prone glut set/TA draw, H/L hip abd/add. Patient able to abolish radicular and LBP with lying prone. Attempted PA pressure to lumbar spine, patient reported increased LBP. Post Treatment Pain Level (on 0 to 10) scale:   6  / 10     ASSESSMENT    Assessment/Changes in Function:     Good tolerance with exercises as evidenced by 2/10 pain rating at end of therapy.      []  See Progress Note/Recertification   Patient will continue to benefit from skilled PT services to modify and progress therapeutic interventions, address functional mobility deficits, address ROM deficits, address strength deficits, analyze and address soft tissue restrictions, analyze and cue movement patterns, analyze and modify body mechanics/ergonomics, assess and modify postural abnormalities and instruct in home and community integration to attain remaining goals. Progress toward goals / Updated goals:    Reports partial compliance with HEP.      PLAN    [x]  Upgrade activities as tolerated YES Continue plan of care   []  Discharge due to :    []  Other:      Therapist: Ricardo Mitchell PT    Date: 12/10/2018 Time: 3:08 PM     Future Appointments   Date Time Provider Muriel Lowery   12/17/2018  1:00 PM Danial [address]   12/31/2018  1:00 PM Danial Rg [facility]   1/28/2019  1:30 PM Frederick Ellsworth MD [facility]

## 2018-12-12 ENCOUNTER — TELEPHONE (OUTPATIENT)
Dept: FAMILY MEDICINE CLINIC | Age: 68
End: 2018-12-12

## 2018-12-12 NOTE — TELEPHONE ENCOUNTER
Patient called in stating that she needed a letter from Dr. Krzysztof estrada from [place] the [place] [facility] for [facility] for the month of January 2019. She did request the Doctor call her to let her know he sent it.  Please assist

## 2018-12-13 NOTE — TELEPHONE ENCOUNTER
Nurse called patient, two patient identifiers used and confirmed by patient. Spoke with patient, patient advised letter is ready, pt will  letter from office. This encounter will be closed.

## 2018-12-17 ENCOUNTER — HOSPITAL ENCOUNTER (OUTPATIENT)
Dept: PHYSICAL THERAPY | Age: 68
Discharge: HOME | End: 2018-12-17
Payer: MEDICARE

## 2018-12-17 PROCEDURE — 97110 THERAPEUTIC EXERCISES: CPT

## 2018-12-17 NOTE — PROGRESS NOTES
PHYSICAL THERAPY - DAILY TREATMENT NOTE    Patient Name: Jaime Brennan        Date: 2018  : 1950   YES Patient  Verified  Visit #:   3     Insurance: Payor: Regi Monge / Plan: [address] [place] [address] HMO / Product Type: Managed Care Medicare /      In time: 1:06 Out time: 1:31   Total Treatment Time: 25     Medicare/BCBS [place] Time Tracking (below)   Total Timed Codes (min):  25 1:1 Treatment Time:  25     TREATMENT AREA =  LBP/radicular pain    SUBJECTIVE    Pain Level (on 0 to 10 scale):  6  / 10 right knee 3/10 radicular pain   Medication Changes/New allergies or changes in medical history, any new surgeries or procedures? NO    If yes, update Summary List   Subjective Functional Status/Changes:  []  No changes reported     Reports a 60%  Improvement in symptoms since beginning therapy. Reports right knee pain today but states \"sometimes I get this pain in my knee. \"  Reports improved compliance with HEP. OBJECTIVE    25 min Therapeutic Exercise:  [x]  See flow sheet   Rationale:      increase ROM and increase strength to improve the patients ability to perform ADL's, gait, and functional mobility with decreased pain. min Patient Education:  YES  Reviewed HEP   []  Progressed/Changed HEP based on: Added standing trunk ext and bridges to HEP. Other Objective/Functional Measures:    Patient able to abolish radicular symptoms with standing trunk extension today. Post Treatment Pain Level (on 0 to 10) scale:   2  / 10     ASSESSMENT    Assessment/Changes in Function:     Progressing well as evidenced by subjective reports and fact that she can now abolish radicular symptoms in a loaded position.      []  See Progress Note/Recertification   Patient will continue to benefit from skilled PT services to modify and progress therapeutic interventions, address functional mobility deficits, address ROM deficits, address strength deficits, analyze and address soft tissue restrictions, analyze and cue movement patterns, analyze and modify body mechanics/ergonomics, assess and modify postural abnormalities and instruct in home and community integration to attain remaining goals. Progress toward goals / Updated goals: · Short Term Goals: To be accomplished in  2  weeks:  1. Patient will be compliant with home exercise program. Reports compliance with HEP.        PLAN    [x]  Upgrade activities as tolerated YES Continue plan of care   []  Discharge due to :    []  Other:      Therapist: Lalito Mcallister PT    Date: 12/17/2018 Time: 1:16 PM     Future Appointments   Date Time Provider Muriel Lowery   12/31/2018  1:00 PM Nahomy Santos PT [facility]   1/28/2019  1:30 PM Munir Pérez MD [facility]

## 2018-12-26 DIAGNOSIS — K21.9 GASTROESOPHAGEAL REFLUX DISEASE WITHOUT ESOPHAGITIS: ICD-10-CM

## 2018-12-26 DIAGNOSIS — N39.41 URGE INCONTINENCE OF URINE: ICD-10-CM

## 2018-12-26 DIAGNOSIS — E78.00 HYPERCHOLESTEREMIA: ICD-10-CM

## 2018-12-27 DIAGNOSIS — E78.00 HYPERCHOLESTEREMIA: ICD-10-CM

## 2018-12-30 RX ORDER — ATORVASTATIN CALCIUM 10 MG/1
TABLET, FILM COATED ORAL
Qty: 90 TAB | Refills: 0 | Status: SHIPPED | OUTPATIENT
Start: 2018-12-30 | End: 2019-05-16

## 2018-12-30 RX ORDER — OXYBUTYNIN CHLORIDE 5 MG/1
TABLET ORAL
Qty: 90 TAB | Refills: 0 | Status: SHIPPED | OUTPATIENT
Start: 2018-12-30 | End: 2019-04-13 | Stop reason: SDUPTHER

## 2018-12-30 RX ORDER — PANTOPRAZOLE SODIUM 40 MG/1
TABLET, DELAYED RELEASE ORAL
Qty: 90 TAB | Refills: 3 | Status: SHIPPED | OUTPATIENT
Start: 2018-12-30 | End: 2019-11-04 | Stop reason: ALTCHOICE

## 2018-12-30 RX ORDER — ATORVASTATIN CALCIUM 10 MG/1
TABLET, FILM COATED ORAL
Qty: 90 TAB | Refills: 0 | Status: SHIPPED | OUTPATIENT
Start: 2018-12-30 | End: 2019-01-28 | Stop reason: SDUPTHER

## 2018-12-31 ENCOUNTER — APPOINTMENT (OUTPATIENT)
Dept: PHYSICAL THERAPY | Age: 68
End: 2018-12-31
Payer: MEDICARE

## 2019-01-07 ENCOUNTER — HOSPITAL ENCOUNTER (OUTPATIENT)
Dept: PHYSICAL THERAPY | Age: 69
Discharge: HOME | End: 2019-01-07
Payer: MEDICARE

## 2019-01-07 PROCEDURE — G8979 MOBILITY GOAL STATUS: HCPCS

## 2019-01-07 PROCEDURE — 97110 THERAPEUTIC EXERCISES: CPT

## 2019-01-07 PROCEDURE — G8980 MOBILITY D/C STATUS: HCPCS

## 2019-01-07 NOTE — PROGRESS NOTES
[facility] PHYSICAL THERAPY  [address] [place] [address] [address], [address] - Phone: [phone]  Fax: [phone]  DISCHARGE SUMMARY FOR PHYSICAL THERAPY          Patient Name: Tracy Alvarado : 1950   Treatment/Medical Diagnosis: Lower back pain [M54.5]  Right hip pain [M25.551]   Onset Date: 2018    Referral Source: Kari Duggan MD [facility]): 2018   Prior Hospitalization: See Medical History Provider #: [id number]   Prior Level of Function: Ambulating with SPC   Comorbidities: anxiety, arthritis, back pain, depression, incontinence, osteoporosis, B TKA's   Medications: Verified on Patient Summary List   Visits from [facility]: 4 Missed Visits: 1     Goal/Measure of Progress Goal Met? 1. Patient will increase FOTO Functional Status score to 52/100 to indicate decreased functional limitations. Status at last Eval: 34/100 Current Status: 39/100 progressing   2. Patient will report ability to consistently abolish/prevent right LE symptoms with HEP. Status at last Eval: NA Current Status: HEP decreases radicular symptoms but patient reports minimal compliance with HEP Partially met   3. Patient will be independent with home exercise program for self management of symptoms. Status at last Eval: NA Current Status: Minimal compliance with HEP Partially met     Key Functional Changes/Progress: Patient has made limited progress with physical therapy. Her visits have been limited due to financial reasons. She reported a 60% improvement in right LE symptoms on 2018. However, on 2019, she reported no progress and stated \"It feels like the pain is getting worse. \"  It should be noted that she was reporting better compliance with her HEP on 2018. Today in the clinic, she was able to decrease pain in right LE by 40% with the exercises. We discussed resuming increased frequency of HEP and patient verbalized agreement.   Patient to report progress to MD at next scheduled appointment. Unfortunately, her poor compliance with HEP is limiting her progress. Feel patient would benefit from a rollator to increase safety with gait. G-Codes (GP): Mobility  [id number] Goal  CK= 40-59% [id number] D/C  CL= 60-79%. The severity rating is based on the FOTO Score  Assessments/Recommendations: Discontinue therapy due to lack of appreciable progress towards goals. If you have any questions/comments please contact us directly at [phone]. Thank you for allowing us to assist in the care of your patient. Therapist Signature: Ysabel Mello, PT Date: 0/9/4486   Certification Period  Reporting Period: 12/6/2018 - 3/5/2019  12/6/2018 - 1/7/2019 Time: 2:40 PM     NOTE TO PHYSICIAN:  PLEASE COMPLETE THE ORDERS BELOW AND FAX TO   [facility] Physical Therapy: [phone]. If you are unable to process this request in 24 hours please contact our office: [phone].    ___ I have read the above report and request that my patient be discharged from therapy.      Physician Signature:        Date:       Time:

## 2019-01-07 NOTE — PROGRESS NOTES
PHYSICAL THERAPY - DAILY TREATMENT NOTE    Patient Name: Munira Colin        Date: 2019  : 1950   YES Patient  Verified  Visit #:   4     Insurance: Payor: Chun Ortiz / Plan: [address] [place] [address] HMO / Product Type: Managed Care Medicare /      In time: 12:58 Out time: 1:15   Total Treatment Time: 17     Medicare/BCBS Lapel Time Tracking (below)   Total Timed Codes (min):  17 1:1 Treatment Time:  17     TREATMENT AREA =  LBP/radicular pain    SUBJECTIVE    Pain Level (on 0 to 10 scale):  10  / 10   Medication Changes/New allergies or changes in medical history, any new surgeries or procedures? NO    If yes, update Summary List   Subjective Functional Status/Changes:  []  No changes reported     \"Some days the pain is fine and other days it is unbearable. \"  \"I have been doing the exercises but maybe not 5x/day. I have been doing them maybe twice per day. \"  \"It feels like the pain is getting worse. \"   \"I just don't know what to do anymore. I just want the pain to go away. \"    Asked patient if she needed to go to the ER due to pain rating of 10/10 today. Patient replied, \"No. They won't do anything there for me anyway. \"         OBJECTIVE    17 min Therapeutic Exercise:  [x]  See flow sheet   Rationale:      increase ROM, increase strength, improve coordination, improve balance and increase proprioception to improve the patients ability to perform ADL's, gait, and functional mobility with improved joint mechanics and decreased pain. min Patient Education:  YES  Reviewed HEP   []  Progressed/Changed HEP based on:   Cont HEP but increase frequency to every hour or every other hour; reminded patient she reported a 60% improvement in symptoms in December when she reported better compliance with HEP     Other Objective/Functional Measures:    Standing trunk extension: no effect on pain.   Prone on elbows: decreased radicular symptoms in right LE (patient rated pain @ 7/10) Post Treatment Pain Level (on 0 to 10) scale:   6  / 10     ASSESSMENT    Assessment/Changes in Function:     See DC note to MD     []  See Progress Note/Recertification      Progress toward goals / Updated goals:    See DC note to MD     PLAN    []  Upgrade activities as tolerated YES Continue plan of care   [x]  Discharge due to : Plateau due to limited compliance with HEP.    []  Other:      Therapist: Gen Regan PT    Date: 1/7/2019 Time: 1:05 PM     Future Appointments   Date Time Provider Muriel Lowery   1/14/2019  1:00 PM Verito Bhatia PT [facility]   1/28/2019  1:30 PM Bing Rehman MD [facility]

## 2019-01-10 ENCOUNTER — TELEPHONE (OUTPATIENT)
Dept: FAMILY MEDICINE CLINIC | Age: 69
End: 2019-01-10

## 2019-01-10 NOTE — TELEPHONE ENCOUNTER
Patient called stating she has a UTI. She wanted [facility] to call something in for her. I explained she needs an appointment. She stated she could not come in today. She was offered 3 appointment times for tomorrow and declined. She then told me never mind and hung the phone up.

## 2019-01-11 DIAGNOSIS — N39.0 URINARY TRACT INFECTION WITHOUT HEMATURIA, SITE UNSPECIFIED: Primary | ICD-10-CM

## 2019-01-11 RX ORDER — CIPROFLOXACIN 500 MG/1
500 TABLET ORAL 2 TIMES DAILY
Qty: 14 TAB | Refills: 0 | Status: SHIPPED | OUTPATIENT
Start: 2019-01-11 | End: 2019-01-18

## 2019-01-14 ENCOUNTER — APPOINTMENT (OUTPATIENT)
Dept: PHYSICAL THERAPY | Age: 69
End: 2019-01-14
Payer: MEDICARE

## 2019-01-14 NOTE — TELEPHONE ENCOUNTER
Nurse called patient, two patient identifiers used and confirmed by patient. Spoke with patient, advised to continue medication. Pt states she forgot what it was for, will restart medication. Pt verbalized understanding, this encounter will be closed.

## 2019-01-28 DIAGNOSIS — M79.89 LEG SWELLING: ICD-10-CM

## 2019-01-28 DIAGNOSIS — E78.00 HYPERCHOLESTEREMIA: ICD-10-CM

## 2019-01-29 RX ORDER — ATORVASTATIN CALCIUM 10 MG/1
TABLET, FILM COATED ORAL
Qty: 90 TAB | Refills: 0 | Status: SHIPPED | OUTPATIENT
Start: 2019-01-29 | End: 2019-08-30 | Stop reason: SDUPTHER

## 2019-01-29 RX ORDER — FUROSEMIDE 40 MG/1
TABLET ORAL
Qty: 30 TAB | Refills: 2 | Status: SHIPPED | OUTPATIENT
Start: 2019-01-29 | End: 2019-04-19 | Stop reason: SDUPTHER

## 2019-02-15 ENCOUNTER — HOSPITAL ENCOUNTER (OUTPATIENT)
Dept: LAB | Age: 69
Discharge: HOME | End: 2019-02-15
Payer: MEDICARE

## 2019-02-15 ENCOUNTER — OFFICE VISIT (OUTPATIENT)
Dept: FAMILY MEDICINE CLINIC | Age: 69
End: 2019-02-15

## 2019-02-15 ENCOUNTER — HOSPITAL ENCOUNTER (OUTPATIENT)
Dept: GENERAL RADIOLOGY | Age: 69
Discharge: HOME | End: 2019-02-15
Payer: MEDICARE

## 2019-02-15 VITALS
HEIGHT: 60 IN | WEIGHT: 241 LBS | DIASTOLIC BLOOD PRESSURE: 53 MMHG | HEART RATE: 75 BPM | BODY MASS INDEX: 47.32 KG/M2 | RESPIRATION RATE: 16 BRPM | TEMPERATURE: 96.4 F | OXYGEN SATURATION: 96 % | SYSTOLIC BLOOD PRESSURE: 114 MMHG

## 2019-02-15 DIAGNOSIS — D64.9 ANEMIA, UNSPECIFIED TYPE: ICD-10-CM

## 2019-02-15 DIAGNOSIS — R63.4 WEIGHT LOSS: ICD-10-CM

## 2019-02-15 DIAGNOSIS — Z12.11 COLON CANCER SCREENING: ICD-10-CM

## 2019-02-15 DIAGNOSIS — N39.0 URINARY TRACT INFECTION WITHOUT HEMATURIA, SITE UNSPECIFIED: Primary | ICD-10-CM

## 2019-02-15 DIAGNOSIS — M79.604 PAIN OF RIGHT LOWER EXTREMITY: ICD-10-CM

## 2019-02-15 LAB
ALBUMIN SERPL-MCNC: 3.4 G/DL (ref 3.4–5)
ALBUMIN/GLOB SERPL: 0.8 {RATIO} (ref 0.8–1.7)
ALP SERPL-CCNC: 102 U/L (ref 45–117)
ALT SERPL-CCNC: 11 U/L (ref 13–56)
ANION GAP SERPL CALC-SCNC: 9 MMOL/L (ref 3–18)
AST SERPL-CCNC: 11 U/L (ref 15–37)
BASOPHILS # BLD: 0 K/UL (ref 0–0.1)
BASOPHILS NFR BLD: 1 % (ref 0–2)
BILIRUB SERPL-MCNC: 1.4 MG/DL (ref 0.2–1)
BILIRUB UR QL STRIP: NEGATIVE
BUN SERPL-MCNC: 10 MG/DL (ref 7–18)
BUN/CREAT SERPL: 13 (ref 12–20)
CALCIUM SERPL-MCNC: 9.5 MG/DL (ref 8.5–10.1)
CHLORIDE SERPL-SCNC: 101 MMOL/L (ref 100–108)
CO2 SERPL-SCNC: 29 MMOL/L (ref 21–32)
CREAT SERPL-MCNC: 0.8 MG/DL (ref 0.6–1.3)
DIFFERENTIAL METHOD BLD: ABNORMAL
EOSINOPHIL # BLD: 0.1 K/UL (ref 0–0.4)
EOSINOPHIL NFR BLD: 1 % (ref 0–5)
ERYTHROCYTE [DISTWIDTH] IN BLOOD BY AUTOMATED COUNT: 14.3 % (ref 11.6–14.5)
FERRITIN SERPL-MCNC: 296 NG/ML (ref 8–388)
GLOBULIN SER CALC-MCNC: 4.4 G/DL (ref 2–4)
GLUCOSE SERPL-MCNC: 90 MG/DL (ref 74–99)
GLUCOSE UR-MCNC: NEGATIVE MG/DL
HCT VFR BLD AUTO: 38 % (ref 35–45)
HGB BLD-MCNC: 12 G/DL (ref 12–16)
IRON SATN MFR SERPL: 15 %
IRON SERPL-MCNC: 37 UG/DL (ref 50–175)
KETONES P FAST UR STRIP-MCNC: NEGATIVE MG/DL
LYMPHOCYTES # BLD: 1.6 K/UL (ref 0.9–3.6)
LYMPHOCYTES NFR BLD: 20 % (ref 21–52)
MCH RBC QN AUTO: 28.5 PG (ref 24–34)
MCHC RBC AUTO-ENTMCNC: 31.6 G/DL (ref 31–37)
MCV RBC AUTO: 90.3 FL (ref 74–97)
MONOCYTES # BLD: 0.8 K/UL (ref 0.05–1.2)
MONOCYTES NFR BLD: 10 % (ref 3–10)
NEUTS SEG # BLD: 5.4 K/UL (ref 1.8–8)
NEUTS SEG NFR BLD: 68 % (ref 40–73)
PH UR STRIP: 6.5 [PH] (ref 4.6–8)
PLATELET # BLD AUTO: 337 K/UL (ref 135–420)
PMV BLD AUTO: 10.1 FL (ref 9.2–11.8)
POTASSIUM SERPL-SCNC: 4 MMOL/L (ref 3.5–5.5)
PROT SERPL-MCNC: 7.8 G/DL (ref 6.4–8.2)
PROT UR QL STRIP: NEGATIVE
RBC # BLD AUTO: 4.21 M/UL (ref 4.2–5.3)
SODIUM SERPL-SCNC: 139 MMOL/L (ref 136–145)
SP GR UR STRIP: 1.01 (ref 1–1.03)
TIBC SERPL-MCNC: 252 UG/DL (ref 250–450)
TSH SERPL DL<=0.05 MIU/L-ACNC: 2 UIU/ML (ref 0.36–3.74)
UA UROBILINOGEN AMB POC: NORMAL (ref 0.2–1)
URINALYSIS CLARITY POC: CLEAR
URINALYSIS COLOR POC: YELLOW
URINE BLOOD POC: NEGATIVE
URINE LEUKOCYTES POC: NORMAL
URINE NITRITES POC: NEGATIVE
WBC # BLD AUTO: 7.9 K/UL (ref 4.6–13.2)

## 2019-02-15 PROCEDURE — 83540 ASSAY OF IRON: CPT

## 2019-02-15 PROCEDURE — 36415 COLL VENOUS BLD VENIPUNCTURE: CPT

## 2019-02-15 PROCEDURE — 84443 ASSAY THYROID STIM HORMONE: CPT

## 2019-02-15 PROCEDURE — 80053 COMPREHEN METABOLIC PANEL: CPT

## 2019-02-15 PROCEDURE — 71046 X-RAY EXAM CHEST 2 VIEWS: CPT

## 2019-02-15 PROCEDURE — 87086 URINE CULTURE/COLONY COUNT: CPT

## 2019-02-15 PROCEDURE — 85025 COMPLETE CBC W/AUTO DIFF WBC: CPT

## 2019-02-15 PROCEDURE — 82728 ASSAY OF FERRITIN: CPT

## 2019-02-15 RX ORDER — GABAPENTIN 100 MG/1
100 CAPSULE ORAL 3 TIMES DAILY
Qty: 90 CAP | Refills: 1 | Status: SHIPPED | OUTPATIENT
Start: 2019-02-15 | End: 2019-05-16 | Stop reason: SINTOL

## 2019-02-15 RX ORDER — CIPROFLOXACIN 500 MG/1
500 TABLET ORAL 2 TIMES DAILY
Qty: 10 TAB | Refills: 0 | Status: SHIPPED | OUTPATIENT
Start: 2019-02-15 | End: 2019-02-20

## 2019-02-15 NOTE — PROGRESS NOTES
1. Have you been to the ER, urgent care clinic since your last visit? Hospitalized since your last visit? No 
 
2. Have you seen or consulted any other health care providers outside of the [address] [place] [address] since your last visit? Include any pap smears or colon screening. No 
 
 
 
Chief Complaint Patient presents with  Urinary Burning

## 2019-02-15 NOTE — PROGRESS NOTES
Gianfranco Powell is a 71 y.o.  female and presents with Chief Complaint Patient presents with  Urinary Burning  Weight Loss  Anemia  Leg Pain Pt has  Lost wt. Pt says she has not been trying to loose wt. She has pain radiating into rt leg. She has dysuria and frequent urination. Past Medical History:  
Diagnosis Date  Degenerative disk disease  Depression  Depression  Diffuse idiopathic skeletal hyperostosis  Fracture of leg  Gallstone  Lymphedema of leg 1975  
 bilateral lower legs  Morbid obesity ([address]  Pulmonary arterial hypertension ([address]  MANPREET (stress urinary incontinence, female) Past Surgical History:  
Procedure Laterality Date  HX HYSTERECTOMY  HX KNEE REPLACEMENT    
 HX ORTHOPAEDIC    
 right shoulder and right knee replaced x 2, left knee replaced as well Current Outpatient Medications Medication Sig  ciprofloxacin HCl (CIPRO) 500 mg tablet Take 1 Tab by mouth two (2) times a day for 5 days.  gabapentin (NEURONTIN) 100 mg capsule Take 1 Cap by mouth three (3) times daily.  furosemide (LASIX) 40 mg tablet TAKE 1 TABLET BY MOUTH ONCE DAILY  atorvastatin (LIPITOR) 10 mg tablet TAKE 1 TABLET BY MOUTH ONCE DAILY  pantoprazole (PROTONIX) 40 mg tablet TAKE ONE TABLET BY MOUTH ONCE DAILY  oxybutynin (DITROPAN) 5 mg tablet TAKE 1 TABLET BY MOUTH ONCE DAILY  atorvastatin (LIPITOR) 10 mg tablet TAKE 1 TABLET BY MOUTH ONCE DAILY  LORazepam (ATIVAN) 0.5 mg tablet Take 1 Tab by mouth nightly as needed for Anxiety. Max Daily Amount: 0.5 mg.  
 lidocaine (XYLOCAINE) 4 % topical cream Apply  to affected area three (3) times daily as needed for Pain.  HYDROcodone-acetaminophen (NORCO) 5-325 mg per tablet Take 1 Tab by mouth every eight (8) hours as needed for Pain. Max Daily Amount: 3 Tabs.   
 naloxone (NARCAN) 4 mg/actuation nasal spray Use 1 spray intranasally, then discard. Repeat with new spray every 2 min as needed for opioid overdose symptoms, alternating nostrils.  ibuprofen (MOTRIN) 600 mg tablet Take 1 Tab by mouth every eight (8) hours as needed for Pain.  predniSONE (STERAPRED DS) 10 mg dose pack See administration instruction per 10mg dose pack  DULoxetine (CYMBALTA) 60 mg capsule Take 1 Cap by mouth daily.  potassium chloride SR (KLOR-CON 10) 10 mEq tablet TAKE 1 TABLET BY MOUTH ONCE DAILY  nystatin (MYCOSTATIN) powder Apply to rash 4 times a day  clotrimazole (LOTRIMIN) 1 % topical cream Apply  to affected area two (2) times a day.  Miconazole powd Apply twice daily on the left leg  hydrocortisone (HYCORT) 1 % ointment Apply  to affected area two (2) times a day. use thin layer  alendronate (FOSAMAX) 70 mg tablet Take 1 Tab by mouth every seven (7) days.  metroNIDAZOLE (NORITATE) 1 % topical cream Apply  to affected area two (2) times daily (with meals). Use a thin layer to affected areas after washing  losartan (COZAAR) 25 mg tablet TAKE ONE TABLET BY MOUTH ONCE DAILY  furosemide (LASIX) 40 mg tablet One po daily  ergocalciferol (ERGOCALCIFEROL) 50,000 unit capsule Take 1 Cap by mouth every seven (7) days.  metFORMIN (GLUCOPHAGE) 1,000 mg tablet Take 1 Tab by mouth two (2) times daily (with meals).  albuterol (PROVENTIL HFA, VENTOLIN HFA, PROAIR HFA) 90 mcg/actuation inhaler Take 2 Puffs by inhalation every six (6) hours as needed for Wheezing. No current facility-administered medications for this visit. Health Maintenance Topic Date Due  Shingrix Vaccine Age 50> (1 of 2) 01/31/2000  GLAUCOMA SCREENING Q2Y  07/09/2017  MEDICARE YEARLY EXAM  08/28/2019  BREAST CANCER SCRN MAMMOGRAM  01/02/2020  COLONOSCOPY  07/25/2023  DTaP/Tdap/Td series (2 - Td) 07/09/2025  Hepatitis C Screening  Completed  Bone Densitometry (Dexa) Screening  Completed  Pneumococcal 65+ Low/Medium Risk  Completed  Influenza Age 5 to Adult  Completed Immunization History Administered Date(s) Administered  Influenza High Dose Vaccine PF 10/19/2015, 08/12/2016  Influenza Vaccine 09/19/2017  Influenza Vaccine (Tri) Adjuvanted 11/06/2018  Pneumococcal Conjugate (PCV-13) 08/07/2017  Pneumococcal Polysaccharide (PPSV-23) 07/09/2015, 07/26/2016  Tdap 07/09/2015 No LMP recorded. Patient has had a hysterectomy. Allergies and Intolerances: Allergies Allergen Reactions  Adhesive Other (comments) Skin burns - red spots  Lisinopril Other (comments)  Nitrofurantoin Other (comments) Family History:  
Family History Problem Relation Age of Onset  Heart Disease Father  Diabetes Father Social History: She  reports that  has never smoked. she has never used smokeless tobacco.  She  reports that she does not drink alcohol. Review of Systems:  
General: negative for - chills, fatigue, fever, weight change Psych: negative for - anxiety, depression, irritability or mood swings ENT: negative for - headaches, hearing change, nasal congestion, oral lesions, sneezing or sore throat Heme/ Lymph: negative for - bleeding problems, bruising, pallor or swollen lymph nodes Endo: negative for - hot flashes, polydipsia/polyuria or temperature intolerance Resp: negative for - cough, shortness of breath or wheezing CV: negative for - chest pain, edema or palpitations GI: negative for - abdominal pain, change in bowel habits, constipation, diarrhea or nausea/vomiting : negative for - dysuria, hematuria, incontinence, pelvic pain or vulvar/vaginal symptoms MSK: negative for - joint pain, joint swelling or muscle pain Neuro: negative for - confusion, headaches, seizures or weakness Derm: negative for - dry skin, hair changes, rash or skin lesion changes Physical:  
Vitals:  
Vitals:  
 02/15/19 1780 BP: 114/53 Pulse: 75 Resp: 16 Temp: 96.4 °F (35.8 °C) TempSrc: Oral  
SpO2: 96% Weight: 241 lb (109.3 kg) Height: 5' (1.524 m) Exam:  
HEENT- atraumatic,normocephalic, awake, oriented, well nourished Neck - supple,no enlarged lymph nodes, no JVD, no thyromegaly Chest- CTA, no rhonchi, no crackles Heart- rrr, no murmurs / gallop/rub Abdomen- soft,BS+,NT, no hepatosplenomegaly Ext - no c/c/edema Neuro- no focal deficits. Power 5/5 all extremities Skin - warm,dry, no obvious rashes. Review of Data:  
LABS:  
Lab Results Component Value Date/Time WBC 7.3 11/27/2018 02:05 PM  
 HGB 11.5 (L) 11/27/2018 02:05 PM  
 HCT 36.7 11/27/2018 02:05 PM  
 PLATELET 387 82/58/5078 02:05 PM  
 
Lab Results Component Value Date/Time Sodium 142 11/27/2018 02:05 PM  
 Potassium 3.5 11/27/2018 02:05 PM  
 Chloride 107 11/27/2018 02:05 PM  
 CO2 28 11/27/2018 02:05 PM  
 Glucose 103 (H) 11/27/2018 02:05 PM  
 BUN 12 11/27/2018 02:05 PM  
 Creatinine 0.87 11/27/2018 02:05 PM  
 
Lab Results Component Value Date/Time Cholesterol, total 166 05/07/2018 04:06 AM  
 HDL Cholesterol 73 05/07/2018 04:06 AM  
 LDL, calculated 71 05/07/2018 04:06 AM  
 Triglyceride 109 05/07/2018 04:06 AM  
 
No results found for: GPT Impression / Plan: ICD-10-CM ICD-9-CM 1. Urinary tract infection without hematuria, site unspecified N39.0 599.0 AMB POC URINALYSIS DIP STICK AUTO W/ MICRO  
   ciprofloxacin HCl (CIPRO) 500 mg tablet 2. Anemia, unspecified type D64.9 285.9 FERRITIN  
   IRON PROFILE REFERRAL TO GASTROENTEROLOGY  
   CBC WITH AUTOMATED DIFF 3. Colon cancer screening Z12.11 V76.51 REFERRAL TO GASTROENTEROLOGY 4. Pain of right lower extremity M79.604 729.5 gabapentin (NEURONTIN) 100 mg capsule 5.  Weight loss R63.4 783.21 XR CHEST PA LAT  
   TSH 3RD GENERATION  
   METABOLIC PANEL, COMPREHENSIVE  
 
 
 
 Explained to patient risk benefits of the medications. Advised patient to stop meds if having any side effects. Pt verbalized understanding of the instructions. I have discussed the diagnosis with the patient and the intended plan as seen in the above orders. The patient has received an after-visit summary and questions were answered concerning future plans. I have discussed medication side effects and warnings with the patient as well. I have reviewed the plan of care with the patient, accepted their input and they are in agreement with the treatment goals. Reviewed plan of care. Patient has provided input and agrees with goals. Follow-up Disposition: Not on File Luis Ross MD

## 2019-02-17 LAB
BACTERIA SPEC CULT: NORMAL
SERVICE CMNT-IMP: NORMAL

## 2019-02-19 ENCOUNTER — OFFICE VISIT (OUTPATIENT)
Dept: FAMILY MEDICINE CLINIC | Age: 69
End: 2019-02-19

## 2019-02-19 VITALS
SYSTOLIC BLOOD PRESSURE: 121 MMHG | TEMPERATURE: 98.3 F | DIASTOLIC BLOOD PRESSURE: 75 MMHG | HEART RATE: 75 BPM | BODY MASS INDEX: 47.32 KG/M2 | RESPIRATION RATE: 18 BRPM | OXYGEN SATURATION: 99 % | WEIGHT: 241 LBS | HEIGHT: 60 IN

## 2019-02-19 DIAGNOSIS — F32.A ACUTE DEPRESSION: ICD-10-CM

## 2019-02-19 DIAGNOSIS — F41.9 ANXIETY: Primary | ICD-10-CM

## 2019-02-19 RX ORDER — SERTRALINE HYDROCHLORIDE 50 MG/1
50 TABLET, FILM COATED ORAL DAILY
Qty: 30 TAB | Refills: 3 | Status: SHIPPED | OUTPATIENT
Start: 2019-02-19 | End: 2019-07-09 | Stop reason: SDUPTHER

## 2019-02-19 RX ORDER — ALPRAZOLAM 0.5 MG/1
0.5 TABLET ORAL
Qty: 45 TAB | Refills: 0 | Status: SHIPPED | OUTPATIENT
Start: 2019-02-19 | End: 2019-05-16 | Stop reason: SDUPTHER

## 2019-02-19 NOTE — PROGRESS NOTES
1. Have you been to the ER, urgent care clinic since your last visit? Hospitalized since your last visit? No    2. Have you seen or consulted any other health care providers outside of the [address] [place] [address] since your last visit? Include any pap smears or colon screening.  No

## 2019-02-20 NOTE — PROGRESS NOTES
Munira Colin is a 71 y.o.  female and presents with     Chief Complaint   Patient presents with    Depression       Pt feels sad and depressed and has been crying lately. Pt says she was doing okay after her  passed away but now she feels sad. Over the week end she was cleaning closets and saw her husbands belongings that made her sad. She has no family. She does not go anywhere. She feels everyone is busy with their lives. She has cousin in [place] but she is busy with her own family. She says ativan did not work. She is taking cymbalta but does not think it is doing anything. Past Medical History:   Diagnosis Date    Degenerative disk disease     Depression     Depression     Diffuse idiopathic skeletal hyperostosis     Fracture of leg     Gallstone     Lymphedema of leg 1975    bilateral lower legs    Morbid obesity ([address]     Pulmonary arterial hypertension (HCC)     MANPREET (stress urinary incontinence, female)      Past Surgical History:   Procedure Laterality Date    HX HYSTERECTOMY      HX KNEE REPLACEMENT      HX ORTHOPAEDIC      right shoulder and right knee replaced x 2, left knee replaced as well     Current Outpatient Medications   Medication Sig    sertraline (ZOLOFT) 50 mg tablet Take 1 Tab by mouth daily.  ALPRAZolam (XANAX) 0.5 mg tablet Take 1 Tab by mouth three (3) times daily as needed for Anxiety. Max Daily Amount: 1.5 mg.    ciprofloxacin HCl (CIPRO) 500 mg tablet Take 1 Tab by mouth two (2) times a day for 5 days.  gabapentin (NEURONTIN) 100 mg capsule Take 1 Cap by mouth three (3) times daily.     furosemide (LASIX) 40 mg tablet TAKE 1 TABLET BY MOUTH ONCE DAILY    atorvastatin (LIPITOR) 10 mg tablet TAKE 1 TABLET BY MOUTH ONCE DAILY    pantoprazole (PROTONIX) 40 mg tablet TAKE ONE TABLET BY MOUTH ONCE DAILY    oxybutynin (DITROPAN) 5 mg tablet TAKE 1 TABLET BY MOUTH ONCE DAILY    atorvastatin (LIPITOR) 10 mg tablet TAKE 1 TABLET BY MOUTH ONCE DAILY    lidocaine (XYLOCAINE) 4 % topical cream Apply  to affected area three (3) times daily as needed for Pain.  HYDROcodone-acetaminophen (NORCO) 5-325 mg per tablet Take 1 Tab by mouth every eight (8) hours as needed for Pain. Max Daily Amount: 3 Tabs.  naloxone (NARCAN) 4 mg/actuation nasal spray Use 1 spray intranasally, then discard. Repeat with new spray every 2 min as needed for opioid overdose symptoms, alternating nostrils.  ibuprofen (MOTRIN) 600 mg tablet Take 1 Tab by mouth every eight (8) hours as needed for Pain.  predniSONE (STERAPRED DS) 10 mg dose pack See administration instruction per 10mg dose pack    potassium chloride SR (KLOR-CON 10) 10 mEq tablet TAKE 1 TABLET BY MOUTH ONCE DAILY    nystatin (MYCOSTATIN) powder Apply to rash 4 times a day    clotrimazole (LOTRIMIN) 1 % topical cream Apply  to affected area two (2) times a day.  Miconazole powd Apply twice daily on the left leg    hydrocortisone (HYCORT) 1 % ointment Apply  to affected area two (2) times a day. use thin layer    alendronate (FOSAMAX) 70 mg tablet Take 1 Tab by mouth every seven (7) days.  metroNIDAZOLE (NORITATE) 1 % topical cream Apply  to affected area two (2) times daily (with meals). Use a thin layer to affected areas after washing    losartan (COZAAR) 25 mg tablet TAKE ONE TABLET BY MOUTH ONCE DAILY    furosemide (LASIX) 40 mg tablet One po daily    ergocalciferol (ERGOCALCIFEROL) 50,000 unit capsule Take 1 Cap by mouth every seven (7) days.  metFORMIN (GLUCOPHAGE) 1,000 mg tablet Take 1 Tab by mouth two (2) times daily (with meals).  albuterol (PROVENTIL HFA, VENTOLIN HFA, PROAIR HFA) 90 mcg/actuation inhaler Take 2 Puffs by inhalation every six (6) hours as needed for Wheezing. No current facility-administered medications for this visit.       Health Maintenance   Topic Date Due    Shingrix Vaccine Age 49> (1 of 2) 01/31/2000    GLAUCOMA SCREENING Q2Y  07/09/2017    MEDICARE YEARLY EXAM  08/28/2019    BREAST CANCER SCRN MAMMOGRAM  01/02/2020    COLONOSCOPY  07/25/2023    DTaP/Tdap/Td series (2 - Td) 07/09/2025    Hepatitis C Screening  Completed    Bone Densitometry (Dexa) Screening  Completed    Pneumococcal 65+ Low/Medium Risk  Completed    Influenza Age 5 to Adult  Completed     Immunization History   Administered Date(s) Administered    Influenza High Dose Vaccine PF 10/19/2015, 08/12/2016    Influenza Vaccine 09/19/2017    Influenza Vaccine (Tri) Adjuvanted 11/06/2018    Pneumococcal Conjugate (PCV-13) 08/07/2017    Pneumococcal Polysaccharide (PPSV-23) 07/09/2015, 07/26/2016    Tdap 07/09/2015     No LMP recorded. Patient has had a hysterectomy. Allergies and Intolerances: Allergies   Allergen Reactions    Adhesive Other (comments)     Skin burns - red spots    Lisinopril Other (comments)    Nitrofurantoin Other (comments)       Family History:   Family History   Problem Relation Age of Onset    Heart Disease Father     Diabetes Father        Social History:   She  reports that  has never smoked. she has never used smokeless tobacco.  She  reports that she does not drink alcohol.             Review of Systems:   General: negative for - chills, fatigue, fever, weight change  Psych: pos for  depression, sadness , grief  ENT: negative for - headaches, hearing change, nasal congestion, oral lesions, sneezing or sore throat  Heme/ Lymph: negative for - bleeding problems, bruising, pallor or swollen lymph nodes  Endo: negative for - hot flashes, polydipsia/polyuria or temperature intolerance  Resp: negative for - cough, shortness of breath or wheezing  CV: negative for - chest pain, edema or palpitations  GI: negative for - abdominal pain, change in bowel habits, constipation, diarrhea or nausea/vomiting  : negative for - dysuria, hematuria, incontinence, pelvic pain or vulvar/vaginal symptoms  MSK: negative for - joint pain, joint swelling or muscle pain  Neuro: negative for - confusion, headaches, seizures or weakness  Derm: negative for - dry skin, hair changes, rash or skin lesion changes          Physical:   Vitals:   Vitals:    02/19/19 1337   BP: 121/75   Pulse: 75   Resp: 18   Temp: 98.3 °F (36.8 °C)   TempSrc: Oral   SpO2: 99%   Weight: 241 lb (109.3 kg)   Height: 5' (1.524 m)           Exam:   HEENT- atraumatic,normocephalic, awake, oriented, well nourished  Neck - supple,no enlarged lymph nodes, no JVD, no thyromegaly  Chest- CTA, no rhonchi, no crackles  Heart- rrr, no murmurs / gallop/rub  Abdomen- soft,BS+,NT, no hepatosplenomegaly  Ext - no c/c/edema   Neuro- no focal deficits. Power 5/5 all extremities  Skin - warm,dry, no obvious rashes. Review of Data:   LABS:   Lab Results   Component Value Date/Time    WBC 7.9 02/15/2019 09:20 AM    HGB 12.0 02/15/2019 09:20 AM    HCT 38.0 02/15/2019 09:20 AM    PLATELET 905 53/58/9152 09:20 AM     Lab Results   Component Value Date/Time    Sodium 139 02/15/2019 09:20 AM    Potassium 4.0 02/15/2019 09:20 AM    Chloride 101 02/15/2019 09:20 AM    CO2 29 02/15/2019 09:20 AM    Glucose 90 02/15/2019 09:20 AM    BUN 10 02/15/2019 09:20 AM    Creatinine 0.80 02/15/2019 09:20 AM     Lab Results   Component Value Date/Time    Cholesterol, total 166 05/07/2018 04:06 AM    HDL Cholesterol 73 05/07/2018 04:06 AM    LDL, calculated 71 05/07/2018 04:06 AM    Triglyceride 109 05/07/2018 04:06 AM     No results found for: GPT        Impression / Plan:        ICD-10-CM ICD-9-CM    1. Anxiety F41.9 300.00 REFERRAL TO PSYCHIATRY      ALPRAZolam (XANAX) 0.5 mg tablet   2. Acute depression F32.9 296.20 REFERRAL TO PSYCHIATRY      sertraline (ZOLOFT) 50 mg tablet     Asked pt to go to [place] , friend house , have company of friends all the time and keep positive frame of mind. Pt says she is going to try. Explained to patient risk benefits of the medications. Advised patient to stop meds if having any side effects. Pt verbalized understanding of the instructions. I have discussed the diagnosis with the patient and the intended plan as seen in the above orders. The patient has received an after-visit summary and questions were answered concerning future plans. I have discussed medication side effects and warnings with the patient as well. I have reviewed the plan of care with the patient, accepted their input and they are in agreement with the treatment goals. Reviewed plan of care. Patient has provided input and agrees with goals. Follow-up Disposition:  Return in about 2 weeks (around 3/5/2019).     Trini Patrick MD

## 2019-03-31 DIAGNOSIS — M70.61 TROCHANTERIC BURSITIS OF RIGHT HIP: ICD-10-CM

## 2019-04-01 RX ORDER — IBUPROFEN 600 MG/1
TABLET ORAL
Qty: 30 TAB | Refills: 2 | Status: SHIPPED | OUTPATIENT
Start: 2019-04-01 | End: 2019-12-04

## 2019-04-04 DIAGNOSIS — I10 ESSENTIAL HYPERTENSION: ICD-10-CM

## 2019-04-04 DIAGNOSIS — M79.89 LEG SWELLING: ICD-10-CM

## 2019-04-04 RX ORDER — POTASSIUM CHLORIDE 750 MG/1
TABLET, FILM COATED, EXTENDED RELEASE ORAL
Qty: 90 TAB | Refills: 1 | Status: SHIPPED | OUTPATIENT
Start: 2019-04-04 | End: 2019-09-20

## 2019-04-04 RX ORDER — LOSARTAN POTASSIUM 25 MG/1
TABLET ORAL
Qty: 90 TAB | Refills: 1 | Status: SHIPPED | OUTPATIENT
Start: 2019-04-04 | End: 2019-09-20 | Stop reason: SDUPTHER

## 2019-04-13 DIAGNOSIS — N39.41 URGE INCONTINENCE OF URINE: ICD-10-CM

## 2019-04-15 RX ORDER — OXYBUTYNIN CHLORIDE 5 MG/1
TABLET ORAL
Qty: 90 TAB | Refills: 0 | Status: SHIPPED | OUTPATIENT
Start: 2019-04-15 | End: 2019-05-16 | Stop reason: ALTCHOICE

## 2019-04-19 DIAGNOSIS — M79.89 LEG SWELLING: ICD-10-CM

## 2019-04-19 DIAGNOSIS — N39.41 URGE INCONTINENCE OF URINE: ICD-10-CM

## 2019-04-19 RX ORDER — FUROSEMIDE 40 MG/1
TABLET ORAL
Qty: 30 TAB | Refills: 2 | Status: SHIPPED | OUTPATIENT
Start: 2019-04-19 | End: 2019-09-20

## 2019-04-19 RX ORDER — OXYBUTYNIN CHLORIDE 5 MG/1
TABLET ORAL
Qty: 90 TAB | Refills: 0 | Status: SHIPPED | OUTPATIENT
Start: 2019-04-19 | End: 2019-05-16 | Stop reason: SDUPTHER

## 2019-05-16 ENCOUNTER — OFFICE VISIT (OUTPATIENT)
Dept: FAMILY MEDICINE CLINIC | Age: 69
End: 2019-05-16

## 2019-05-16 VITALS
OXYGEN SATURATION: 98 % | HEART RATE: 63 BPM | HEIGHT: 60 IN | RESPIRATION RATE: 18 BRPM | WEIGHT: 245.4 LBS | DIASTOLIC BLOOD PRESSURE: 74 MMHG | BODY MASS INDEX: 48.18 KG/M2 | TEMPERATURE: 98.2 F | SYSTOLIC BLOOD PRESSURE: 128 MMHG

## 2019-05-16 DIAGNOSIS — I10 HTN (HYPERTENSION), BENIGN: Primary | ICD-10-CM

## 2019-05-16 DIAGNOSIS — M51.26 HERNIATED NUCLEUS PULPOSUS, LUMBAR: ICD-10-CM

## 2019-05-16 DIAGNOSIS — F41.8 DEPRESSION WITH ANXIETY: ICD-10-CM

## 2019-05-16 DIAGNOSIS — Z13.5 SCREENING FOR GLAUCOMA: ICD-10-CM

## 2019-05-16 DIAGNOSIS — M81.0 AGE-RELATED OSTEOPOROSIS WITHOUT CURRENT PATHOLOGICAL FRACTURE: ICD-10-CM

## 2019-05-16 DIAGNOSIS — K21.9 GASTROESOPHAGEAL REFLUX DISEASE WITHOUT ESOPHAGITIS: ICD-10-CM

## 2019-05-16 DIAGNOSIS — F41.9 ANXIETY: ICD-10-CM

## 2019-05-16 DIAGNOSIS — I89.0 LYMPHEDEMA OF BOTH LOWER EXTREMITIES: ICD-10-CM

## 2019-05-16 DIAGNOSIS — E78.5 DYSLIPIDEMIA: ICD-10-CM

## 2019-05-16 PROBLEM — N39.0 UTI (URINARY TRACT INFECTION): Status: RESOLVED | Noted: 2017-10-19 | Resolved: 2019-05-16

## 2019-05-16 RX ORDER — ALPRAZOLAM 0.5 MG/1
0.5 TABLET ORAL
Qty: 30 TAB | Refills: 0 | Status: SHIPPED | OUTPATIENT
Start: 2019-05-16 | End: 2019-09-20 | Stop reason: ALTCHOICE

## 2019-05-16 RX ORDER — ALENDRONATE SODIUM 70 MG/1
70 TABLET ORAL
Qty: 12 TAB | Refills: 1 | Status: SHIPPED | OUTPATIENT
Start: 2019-05-16 | End: 2019-12-04 | Stop reason: SDUPTHER

## 2019-05-16 NOTE — PROGRESS NOTES
History of Present Illness Amber Castillo is a 71 y.o. female who presents today for management of 
 
Chief Complaint Patient presents with [facility]  Hip Pain  
  right side back down to leg Patient is here to establish care. Previous PCP: Dr. Janise Duverney Hip Pain Patient complains of right hip and leg pain. Onset of the symptoms was several months ago. Inciting event: none. Current symptoms include right hip pain. Severity = fairly severe 
location: lateral, over greater trochanter, posterior 
aggravated by walking. Associated symptoms: leg pain. Aggravating symptoms: standing, walking. Patient's overall course: gradually worsening. Patient has had prior hip problems. Previous visits for this problem: yes, last seen 1 month ago by Dr. Lucrecia Pereyra. Evaluation to date: MRI - herniated nucleus pulposus. She was recommended surgery vs steroid injection. Patient would like to try injection first (scheduled on 5/24). Treatment to date: PT: not very effective. Cardiovascular Review: 
The patient has hypertension and hyperlipidemia. Diet and Lifestyle: generally follows a low fat low cholesterol diet, generally follows a low sodium diet, nonsmoker Home BP Monitoring: is not measured at home. Pertinent ROS: taking medications as instructed, no medication side effects noted, no TIA's, no chest pain on exertion, no dyspnea on exertion, no swelling of ankles. Depression/Anxiety Review: 
Patient is seen for followup of depression and anxiety. Treatment includes Zoloft, Xanax and no other therapies. Her  passed way one year ago. Ongoing symptoms include episodic depressed mood, improved anxiety. She denies feelings of worthlessness/guilt, hopelessness, impaired memory and recurrent thoughts of death. She experiences the following side effects from the treatment: none. Past Medical History Past Medical History:  
Diagnosis Date  Degenerative disk disease  Depression  Depression  Diffuse idiopathic skeletal hyperostosis  Fracture of leg  Gallstone  Lymphedema of leg 1975  
 bilateral lower legs  Morbid obesity ([address]  Pulmonary arterial hypertension ([facility] [address]  MANPREET (stress urinary incontinence, female) Surgical History Past Surgical History:  
Procedure Laterality Date  HX HYSTERECTOMY  HX KNEE REPLACEMENT    
 HX ORTHOPAEDIC    
 right shoulder and right knee replaced x 2, left knee replaced as well Current Medications Current Outpatient Medications Medication Sig  
 alendronate (FOSAMAX) 70 mg tablet Take 1 Tab by mouth every seven (7) days.  ALPRAZolam (XANAX) 0.5 mg tablet Take 1 Tab by mouth three (3) times daily as needed for Anxiety. Max Daily Amount: 1.5 mg.  
 furosemide (LASIX) 40 mg tablet TAKE 1 TABLET BY MOUTH ONCE DAILY  losartan (COZAAR) 25 mg tablet TAKE 1 TABLET BY MOUTH ONCE DAILY  potassium chloride SR (KLOR-CON 10) 10 mEq tablet TAKE 1 TABLET BY MOUTH ONCE DAILY  ibuprofen (MOTRIN) 600 mg tablet TAKE 1 TABLET BY MOUTH EVERY 8 HOURS AS NEEDED FOR PAIN  
 sertraline (ZOLOFT) 50 mg tablet Take 1 Tab by mouth daily.  atorvastatin (LIPITOR) 10 mg tablet TAKE 1 TABLET BY MOUTH ONCE DAILY  pantoprazole (PROTONIX) 40 mg tablet TAKE ONE TABLET BY MOUTH ONCE DAILY No current facility-administered medications for this visit. Allergies/Drug Reactions Allergies Allergen Reactions  Adhesive Other (comments) Skin burns - red spots  Lisinopril Other (comments)  Nitrofurantoin Other (comments) Family History Family History Problem Relation Age of Onset  Heart Disease Father  Diabetes Father Social History Social History Socioeconomic History  Marital status:  Spouse name: Not on file  Number of children: Not on file  Years of education: Not on file  Highest education level: Not on file Occupational History  Not on file Social Needs  Financial resource strain: Not on file  Food insecurity:  
  Worry: Not on file Inability: Not on file  Transportation needs:  
  Medical: Not on file Non-medical: Not on file Tobacco Use  Smoking status: Never Smoker  Smokeless tobacco: Never Used Substance and Sexual Activity  Alcohol use: No  
  Alcohol/week: 0.0 oz  Drug use: No  
 Sexual activity: Not on file Lifestyle  Physical activity:  
  Days per week: Not on file Minutes per session: Not on file  Stress: Not on file Relationships  Social connections:  
  Talks on phone: Not on file Gets together: Not on file Attends [employer] service: Not on file Active member of club or organization: Not on file Attends meetings of clubs or organizations: Not on file Relationship status: Not on file  Intimate partner violence:  
  Fear of current or ex partner: Not on file Emotionally abused: Not on file Physically abused: Not on file Forced sexual activity: Not on file Other Topics Concern  Not on file Social History Narrative  Not on file Health Maintenance Topic Date Due  Shingrix Vaccine Age 50> (1 of 2) 01/31/2000  GLAUCOMA SCREENING Q2Y  07/09/2017  Influenza Age 5 to Adult  08/01/2019  MEDICARE YEARLY EXAM  08/28/2019  BREAST CANCER SCRN MAMMOGRAM  01/31/2021  COLONOSCOPY  07/25/2023  DTaP/Tdap/Td series (2 - Td) 07/09/2025  Hepatitis C Screening  Completed  Bone Densitometry (Dexa) Screening  Completed  Pneumococcal 65+ years  Completed Immunization History Administered Date(s) Administered  Influenza High Dose Vaccine PF 10/19/2015, 08/12/2016  Influenza Vaccine 10/16/2011, 12/22/2012, 09/19/2017  Influenza Vaccine (Tri) Adjuvanted 11/06/2018  Pneumococcal Conjugate (PCV-13) 08/07/2017  Pneumococcal Polysaccharide (PPSV-23) 07/09/2015, 07/26/2016  Tdap 07/09/2015 Review of Systems Negative except as mentioned in HPI Physical Exam 
Vital signs:  
Vitals:  
 05/16/19 1431 BP: 128/74 Pulse: 63 Resp: 18 Temp: 98.2 °F (36.8 °C) TempSrc: Oral  
SpO2: 98% Weight: 245 lb 6.4 oz (111.3 kg) Height: 5' (1.524 m) General: alert, oriented, not in distress, ambulates with a walker Head: scalp normal, atraumatic Eyes: pupils are equal and reactive, full and intact EOM's 
Neck: supple, no JVD, no lymphadenopathy, non-palpable thyroid Chest/Lungs: clear breath sounds, no wheezing or crackles Heart: normal rate, regular rhythm, no murmur Extremities: no focal deformities, bilateral leg edema Skin: no active skin lesions Laboratory/Tests: 
Component Latest Ref Rng & Units 2/15/2019 2/15/2019 2/15/2019  
 
      9:20 AM  9:20 AM  9:20 AM  
WBC 
    4.6 - 13.2 K/uL   7.9  
RBC 
    4.20 - 5.30 M/uL   4.21  
HGB 12.0 - 16.0 g/dL   12.0 HCT 
    35.0 - 45.0 %   38.0 MCV 
    74.0 - 97.0 FL   90.3 MCH 
    24.0 - 34.0 PG   28.5 MCHC 31.0 - 37.0 g/dL   31.6 RDW 
    11.6 - 14.5 %   14.3 PLATELET 
    358 - 368 K/uL   337 MPV 
    9.2 - 11.8 FL   10.1 NEUTROPHILS 
    40 - 73 %   68 LYMPHOCYTES 
    21 - 52 %   20 (L) MONOCYTES 
    3 - 10 %   10 EOSINOPHILS 
    0 - 5 %   1  
BASOPHILS 
    0 - 2 %   1  
ABS. NEUTROPHILS 
    1.8 - 8.0 K/UL   5.4  
ABS. LYMPHOCYTES 
    0.9 - 3.6 K/UL   1.6  
ABS. MONOCYTES 
    0.05 - 1.2 K/UL   0.8 ABS. EOSINOPHILS 
    0.0 - 0.4 K/UL   0.1 ABS. BASOPHILS 
    0.0 - 0.1 K/UL   0.0  
DF AUTOMATED Sodium 136 - 145 mmol/L 139 Potassium 3.5 - 5.5 mmol/L 4.0 Chloride 100 - 108 mmol/L 101 CO2 
    21 - 32 mmol/L 29 Anion gap 3.0 - 18 mmol/L 9 Glucose 74 - 99 mg/dL 90 BUN 
    7.0 - 18 MG/DL 10 Creatinine 
    0.6 - 1.3 MG/DL 0.80 BUN/Creatinine ratio 12 - 20   13 GFR est AA 
    >60 ml/min/1.73m2 >60    
GFR est non-AA >60 ml/min/1.73m2 >60 Calcium 8.5 - 10.1 MG/DL 9.5 Bilirubin, total 
    0.2 - 1.0 MG/DL 1.4 (H) ALT (SGPT) 13 - 56 U/L 11 (L) AST 
    15 - 37 U/L 11 (L) Alk. phosphatase 45 - 117 U/L 102 Protein, total 
    6.4 - 8.2 g/dL 7.8 Albumin 3.4 - 5.0 g/dL 3.4 Globulin 2.0 - 4.0 g/dL 4.4 (H) A-G Ratio 
    0.8 - 1.7   0.8 TSH 
    0.36 - 3.74 uIU/mL  2.00 Component Latest Ref Rng & Units 2/15/2019 2/15/2019 11/27/2018  
 
      9:20 AM  9:20 AM  2:05 PM  
Iron 50 - 175 ug/dL 37 (L) TIBC 
    250 - 450 ug/dL 252 Iron % saturation 
    % 15 Hemoglobin A1c, (calculated) 4.2 - 5.6 %   5.7 (H) Est. average glucose 
    mg/dL   117 Ferritin 8 - 388 NG/ML  296 Component Latest Ref Rng & Units 5/7/2018  
 
      4:06 AM  
Cholesterol, total 
    100 - 199 mg/dL 166 Triglyceride 0 - 149 mg/dL 109 HDL Cholesterol >39 mg/dL 73 VLDL, calculated 5 - 40 mg/dL 22 LDL, calculated 0 - 99 mg/dL 71 MR LUMBAR SPINE W/O CONTRAST4/7/2019 [employer] Result Impression At L4-L5 degenerative changes characterized by severe facet arthropathy and grade 1 anterolisthesis results in moderate spinal canal stenosis and mild to moderate foraminal stenoses. 
 
-No more than mild stenosis elsewhere. There are still notable degenerative changes at most levels, characterized by multilevel advanced facet arthropathy and mild disc herniations. See level by level details above. Right hip two separate views 10/2018 
  
Comparison: None 
  
History: Right hip pain 
  
Findings: 
  
Moderate loss of joint space of the right hip joint. .  No fracture, dislocation 
or intrinsic bony abnormality is identified. Subchondral sclerotic change and 
irregularity noted of the right sacroiliac joint as well. 
  
IMPRESSION IMPRESSION: 
  
 Moderate osteoarthritic degenerative changes of the right hip joint. Degenerative changes of the right sacroiliac joint noted as well. DUPLEX LOWER EXT RIGHT 10/2018 Right Lower Venous No evidence of deep vein thrombosis in the right lower extremity. The common femoral, profunda femoral, femoral, proximal femoral, mid femoral, distal femoral, popliteal, posterior tibial and saphenous femoral junction vein(s) were imaged in the transverse and longitudinal planes. The vessels showed normal color filling and compressibility. Doppler interrogation of the veins showed phasic and spontaneous flow. The peroneal vein(s) were not well visualized. The right posterior tibial veins were not visualized with b-mode in the low to upper calf however, I was able to show patency with color doppler. The peroneal vein was not visualized. Left Lower Venous For comparison purposes, the left common femoral vein was briefly interrogated. These vein demonstrates normal color filing and compressibility. Doppler flow was phasic and spontaneous.  DXA BONE DENSITOMETRY, CENTRAL 2/2017 
  
INDICATION:  Postmenopausal, follow up. 
  
  
TECHNIQUE: Using GE LUNAR Prodigy densitometer, bone density measurement was 
performed in the lumbar spine in addition to the proximal left and right femurs. T-score refers to standard deviations above or below average compared to a 
young adult of the same sex. Z-score refers to standard deviations above or 
below average compared to a patient of the same sex, age, race and weight.  
  
VALIDITY: 
  
Lumbar spine levels L1-4 were selected because of degenerative endplate 
sclerosis at the more inferior levels. Both hips are valid. 
  
RESULTS: 
  
The bone mineral density of the L1-L4 vertebral bodies is 1.132 g/cm2. This 
corresponds to a T-score of -0. 4.   
  
The bone mineral density of the left femoral neck is 0.696 g/cm2. This 
corresponds to a T-score of -2. 5.  
  
 The bone mineral density of the right femoral neck is 0.748 g/cm2. This 
corresponds to a T-score of -2. 1. 
  
FRAX CALCULATION 10-YEAR PROBABILITY OF FRACTURE: 
  
Major osteoporotic compression fracture: 9.9 % Hip fracture: 1.8 % 
  The National Osteoporosis Foundation has recommended that treatment be 
considered for those with a 10-year probability of: 
  
Major osteoporotic compression fracture: >20% Hip fracture: >3% 
  
  
COMPARISON:   
  
Compared to the prior study of 12/01/2014. 
  
Spine: BMD has increased by 3.4%, which is statistically significant. Left femur: BMD has increased by 3.4%, which is statistically significant. Right femur: BMD has increased by 1.4%, which is not  statistically significant. 
  
A difference of less than 3% is not statistically significant.  
  
IMPRESSION IMPRESSION: 
  
Bone mineral density measures osteoporotic at left hip. Statistical significant interval increase in bone mineral density lumbar spine and left hip from comparison study of 12/01/2014 as above. Assessment/Plan: 1. HTN (hypertension), benign 
- controlled 
- continue losartan and furosemide 2. Dyslipidemia 
- controlled 
- continue Lipitor 3. Depression with anxiety 
- improved 
- continue Zoloft and prn Xanax 4. Gastroesophageal reflux disease without esophagitis 
- advised to take PPI x 2 weeks only if symptomatic 5. Lymphedema of both lower extremities - leg elevation 
- REFERRAL TO LYMPHEDEMA CLINIC 6. Herniated nucleus pulposus, lumbar 
- will see pain management for steroid injection - ibuprofen and Tylenol as needed - Norco for severe pain 7. Age-related osteoporosis without current pathological fracture 
- restart alendronate (FOSAMAX) 70 mg tablet; Take 1 Tab by mouth every seven (7) days. Dispense: 12 Tab; Refill: 1 8. Screening for glaucoma 
- REFERRAL TO OPHTHALMOLOGY 9. Anxiety 
-  reviewed - ALPRAZolam (XANAX) 0.5 mg tablet; Take 1 Tab by mouth three (3) times daily as needed for Anxiety. Max Daily Amount: 1.5 mg.  Dispense: 30 Tab; Refill: 0 Follow-up and Dispositions · Return in about 3 months (around 8/16/2019) for ROV. I have discussed the diagnosis with the patient and the intended plan as seen in the above orders. The patient has received an after-visit summary and questions were answered concerning future plans. I have discussed medication side effects and warnings with the patient as well. I have reviewed the plan of care with the patient, accepted their input and they are in agreement with the treatment goals. Marcelo Esteves MD 
May 16, 2019

## 2019-05-16 NOTE — PROGRESS NOTES
1. Have you been to the ER, urgent care clinic since your last visit? Hospitalized since your last visit? No 
 
2. Have you seen or consulted any other health care providers outside of the [address] [place] [address] since your last visit? Include any pap smears or colon screening. Dr. Carolynn Brown, did recent MRI

## 2019-05-28 ENCOUNTER — TELEPHONE (OUTPATIENT)
Dept: FAMILY MEDICINE CLINIC | Age: 69
End: 2019-05-28

## 2019-05-28 ENCOUNTER — OFFICE VISIT (OUTPATIENT)
Dept: FAMILY MEDICINE CLINIC | Age: 69
End: 2019-05-28

## 2019-05-28 VITALS
SYSTOLIC BLOOD PRESSURE: 95 MMHG | HEIGHT: 60 IN | BODY MASS INDEX: 48.1 KG/M2 | HEART RATE: 72 BPM | TEMPERATURE: 98.4 F | WEIGHT: 245 LBS | DIASTOLIC BLOOD PRESSURE: 64 MMHG | OXYGEN SATURATION: 98 % | RESPIRATION RATE: 18 BRPM

## 2019-05-28 DIAGNOSIS — H60.311 ACUTE DIFFUSE OTITIS EXTERNA OF RIGHT EAR: Primary | ICD-10-CM

## 2019-05-28 RX ORDER — NEOMYCIN SULFATE, POLYMYXIN B SULFATE AND HYDROCORTISONE 10; 3.5; 1 MG/ML; MG/ML; [USP'U]/ML
4 SUSPENSION/ DROPS AURICULAR (OTIC) 4 TIMES DAILY
Qty: 10 ML | Refills: 0 | Status: SHIPPED | OUTPATIENT
Start: 2019-05-28 | End: 2019-11-04 | Stop reason: ALTCHOICE

## 2019-05-28 NOTE — PROGRESS NOTES
Chief Complaint   Patient presents with    Ear Pain     right x 3 days, pt states she stuck a q tip in ear x 3 days      1. Have you been to the ER, urgent care clinic since your last visit? Hospitalized since your last visit? No    2. Have you seen or consulted any other health care providers outside of the [address] [place] [address] since your last visit? Include any pap smears or colon screening.  No

## 2019-05-28 NOTE — PROGRESS NOTES
History of Present Illness  Sosa Nelson is a 71 y.o. female who presents today for management of    Chief Complaint   Patient presents with    Ear Pain     right x 3 days, pt states she stuck a q tip in ear x 3 days        Ear Pain  Patient complains of right ear pain. Onset of symptoms was 2 days ago, unchanged since that time. She also notes decreased hearing right side, drainage right side, moderate pain. She does not have a history of ear infections. She does not have a history of swimming. She has tried nothing for her symptoms. Problem List  Patient Active Problem List    Diagnosis Date Noted    Dyslipidemia 05/16/2019    Herniated nucleus pulposus, lumbar 05/16/2019    Lymphedema of both lower extremities 08/27/2018    Osteoporosis 05/01/2017    Gastroesophageal reflux disease without esophagitis 10/26/2016    Advance care planning 10/03/2016    HTN (hypertension), benign 04/19/2016    Stress incontinence of urine 11/24/2015    Cataract 11/19/2015    Morbid obesity ([address] 11/26/2014    Depression 11/26/2014    Impaired hearing 11/26/2014       Past Medical History  Past Medical History:   Diagnosis Date    Degenerative disk disease     Depression     Depression     Diffuse idiopathic skeletal hyperostosis     Fracture of leg     Gallstone     Lymphedema of leg 1975    bilateral lower legs    Morbid obesity ([address]     Pulmonary arterial hypertension ([address]     MANPREET (stress urinary incontinence, female)         Surgical History  Past Surgical History:   Procedure Laterality Date    HX HYSTERECTOMY      HX KNEE REPLACEMENT      HX ORTHOPAEDIC      right shoulder and right knee replaced x 2, left knee replaced as well        Current Medications  Current Outpatient Medications   Medication Sig    neomycin-polymyxin-hydrocortisone, buffered, (PEDIOTIC) 3.5-10,000-1 mg/mL-unit/mL-% otic suspension Administer 4 Drops in right ear four (4) times daily.     alendronate (FOSAMAX) 70 mg tablet Take 1 Tab by mouth every seven (7) days.  ALPRAZolam (XANAX) 0.5 mg tablet Take 1 Tab by mouth three (3) times daily as needed for Anxiety. Max Daily Amount: 1.5 mg.    furosemide (LASIX) 40 mg tablet TAKE 1 TABLET BY MOUTH ONCE DAILY    losartan (COZAAR) 25 mg tablet TAKE 1 TABLET BY MOUTH ONCE DAILY    potassium chloride SR (KLOR-CON 10) 10 mEq tablet TAKE 1 TABLET BY MOUTH ONCE DAILY    ibuprofen (MOTRIN) 600 mg tablet TAKE 1 TABLET BY MOUTH EVERY 8 HOURS AS NEEDED FOR PAIN    sertraline (ZOLOFT) 50 mg tablet Take 1 Tab by mouth daily.  atorvastatin (LIPITOR) 10 mg tablet TAKE 1 TABLET BY MOUTH ONCE DAILY    pantoprazole (PROTONIX) 40 mg tablet TAKE ONE TABLET BY MOUTH ONCE DAILY     No current facility-administered medications for this visit.         Allergies/Drug Reactions  Allergies   Allergen Reactions    Adhesive Other (comments)     Skin burns - red spots    Lisinopril Other (comments)    Nitrofurantoin Other (comments)        Family History  Family History   Problem Relation Age of Onset    Heart Disease Father     Diabetes Father         Social History  Social History     Socioeconomic History    Marital status:      Spouse name: Not on file    Number of children: Not on file    Years of education: Not on file    Highest education level: Not on file   Occupational History    Not on file   Social Needs    Financial resource strain: Not on file    Food insecurity:     Worry: Not on file     Inability: Not on file    Transportation needs:     Medical: Not on file     Non-medical: Not on file   Tobacco Use    Smoking status: Never Smoker    Smokeless tobacco: Never Used   Substance and Sexual Activity    Alcohol use: No     Alcohol/week: 0.0 oz    Drug use: No    Sexual activity: Not on file   Lifestyle    Physical activity:     Days per week: Not on file     Minutes per session: Not on file    Stress: Not on file   Relationships    Social connections:     Talks on phone: Not on file     Gets together: Not on file     Attends Sabianism service: Not on file     Active member of club or organization: Not on file     Attends meetings of clubs or organizations: Not on file     Relationship status: Not on file    Intimate partner violence:     Fear of current or ex partner: Not on file     Emotionally abused: Not on file     Physically abused: Not on file     Forced sexual activity: Not on file   Other Topics Concern    Not on file   Social History Narrative    Not on file       Review of Systems  Negative except as mentioned in HPI      Physical Exam  Vital signs:   Vitals:    05/28/19 1430   BP: 95/64   Pulse: 72   Resp: 18   Temp: 98.4 °F (36.9 °C)   TempSrc: Oral   SpO2: 98%   Weight: 245 lb (111.1 kg)   Height: 5' (1.524 m)       General: alert, oriented, not in distress  Right ear: no tragal tenderness, mild erythema and minimal discharge on the external ear canal, ceruminosis, TM not visualized  Left ear: normal  Chest/Lungs: clear breath sounds, no wheezing or crackles  Heart: normal rate, regular rhythm, no murmur      Assessment/Plan:      ICD-10-CM ICD-9-CM    1. Acute diffuse otitis externa of right ear H60.311 380.10 neomycin-polymyxin-hydrocortisone, buffered, (PEDIOTIC) 3.5-10,000-1 mg/mL-unit/mL-% otic suspension     Tylenol or NSAID as needed for pain      Follow-up and Dispositions    · Return if symptoms worsen or fail to improve. I have discussed the diagnosis with the patient and the intended plan as seen in the above orders. The patient has received an after-visit summary and questions were answered concerning future plans. I have discussed medication side effects and warnings with the patient as well. I have reviewed the plan of care with the patient, accepted their input and they are in agreement with the treatment goals.        Dameon Montgomery MD  May 28, 2019

## 2019-05-28 NOTE — TELEPHONE ENCOUNTER
Patient called in and would like like to be seen for ear severe pain but there are no available slots for the week.  Please assist.

## 2019-06-06 DIAGNOSIS — F32.A ACUTE DEPRESSION: ICD-10-CM

## 2019-06-06 RX ORDER — SERTRALINE HYDROCHLORIDE 50 MG/1
TABLET, FILM COATED ORAL
Qty: 30 TAB | Refills: 3 | OUTPATIENT
Start: 2019-06-06

## 2019-06-18 DIAGNOSIS — F32.A ACUTE DEPRESSION: ICD-10-CM

## 2019-06-18 RX ORDER — SERTRALINE HYDROCHLORIDE 50 MG/1
TABLET, FILM COATED ORAL
Qty: 30 TAB | Refills: 3 | OUTPATIENT
Start: 2019-06-18

## 2019-06-26 DIAGNOSIS — F32.A ACUTE DEPRESSION: ICD-10-CM

## 2019-06-26 RX ORDER — SERTRALINE HYDROCHLORIDE 50 MG/1
TABLET, FILM COATED ORAL
Qty: 30 TAB | Refills: 3 | OUTPATIENT
Start: 2019-06-26

## 2019-07-03 DIAGNOSIS — F32.A ACUTE DEPRESSION: ICD-10-CM

## 2019-07-05 RX ORDER — SERTRALINE HYDROCHLORIDE 50 MG/1
TABLET, FILM COATED ORAL
Qty: 30 TAB | Refills: 3 | OUTPATIENT
Start: 2019-07-05

## 2019-07-07 DIAGNOSIS — F32.A ACUTE DEPRESSION: ICD-10-CM

## 2019-07-07 RX ORDER — SERTRALINE HYDROCHLORIDE 50 MG/1
TABLET, FILM COATED ORAL
Qty: 30 TAB | Refills: 3 | OUTPATIENT
Start: 2019-07-07

## 2019-07-09 DIAGNOSIS — F32.A ACUTE DEPRESSION: ICD-10-CM

## 2019-07-09 RX ORDER — SERTRALINE HYDROCHLORIDE 50 MG/1
TABLET, FILM COATED ORAL
Qty: 30 TAB | Refills: 3 | OUTPATIENT
Start: 2019-07-09

## 2019-07-10 DIAGNOSIS — F32.A ACUTE DEPRESSION: ICD-10-CM

## 2019-07-10 RX ORDER — SERTRALINE HYDROCHLORIDE 50 MG/1
50 TABLET, FILM COATED ORAL DAILY
Qty: 30 TAB | Refills: 3 | Status: SHIPPED | OUTPATIENT
Start: 2019-07-10 | End: 2019-07-12

## 2019-07-11 DIAGNOSIS — E78.00 HYPERCHOLESTEREMIA: ICD-10-CM

## 2019-07-11 RX ORDER — SERTRALINE HYDROCHLORIDE 50 MG/1
TABLET, FILM COATED ORAL
Qty: 30 TAB | Refills: 3 | OUTPATIENT
Start: 2019-07-11

## 2019-07-12 ENCOUNTER — OFFICE VISIT (OUTPATIENT)
Dept: FAMILY MEDICINE CLINIC | Age: 69
End: 2019-07-12

## 2019-07-12 VITALS
TEMPERATURE: 98 F | HEART RATE: 57 BPM | WEIGHT: 234.2 LBS | DIASTOLIC BLOOD PRESSURE: 73 MMHG | SYSTOLIC BLOOD PRESSURE: 123 MMHG | RESPIRATION RATE: 18 BRPM | HEIGHT: 60 IN | OXYGEN SATURATION: 99 % | BODY MASS INDEX: 45.98 KG/M2

## 2019-07-12 DIAGNOSIS — M25.561 CHRONIC PAIN OF RIGHT KNEE: ICD-10-CM

## 2019-07-12 DIAGNOSIS — Z96.653 STATUS POST BILATERAL KNEE REPLACEMENTS: ICD-10-CM

## 2019-07-12 DIAGNOSIS — F32.A ACUTE DEPRESSION: ICD-10-CM

## 2019-07-12 DIAGNOSIS — G89.29 CHRONIC PAIN OF RIGHT KNEE: ICD-10-CM

## 2019-07-12 DIAGNOSIS — F41.8 DEPRESSION WITH ANXIETY: ICD-10-CM

## 2019-07-12 DIAGNOSIS — H72.91 PERFORATION OF RIGHT TYMPANIC MEMBRANE: Primary | ICD-10-CM

## 2019-07-12 DIAGNOSIS — E66.01 MORBID OBESITY (HCC): ICD-10-CM

## 2019-07-12 RX ORDER — ATORVASTATIN CALCIUM 10 MG/1
TABLET, FILM COATED ORAL
Qty: 90 TAB | Refills: 0 | OUTPATIENT
Start: 2019-07-12

## 2019-07-12 RX ORDER — SERTRALINE HYDROCHLORIDE 50 MG/1
50 TABLET, FILM COATED ORAL DAILY
Qty: 90 TAB | Refills: 3 | Status: SHIPPED | OUTPATIENT
Start: 2019-07-12 | End: 2020-10-09 | Stop reason: DRUGHIGH

## 2019-07-12 NOTE — PROGRESS NOTES
Kerri Madrigal is a 71 y.o.  female and presents with    Chief Complaint   Patient presents with    Ear Fullness     right    Knee Pain     referral request to orthopedic     Subjective:  Ear Pain  Patient complains of ear pain and muffled sound. Symptoms include plugged sensation in right ear. She used a cotton tip applicator in her right ear. Onset of symptoms was 1 week ago, unchanged since that time. She also c/o no other symptoms. She is drinking plenty of fluids. Depression Review:  Patient is seen for followup of depression. Treatment includes Zoloft and she has been out of this for 2 weeks. Ongoing symptoms include depressed mood, fatigue and difficulty concentrating. She denies weight loss, insomnia, psychomotor agitation, hopelessness, impaired memory and recurrent thoughts of death. She experiences the following side effects from the treatment: none. Osteoarthritis and Chronic Pain:  Patient has osteoarthritis, primarily affecting the knees. She is scheduled to see dr. Ashlie Williamson in three days for injection. Symptoms onset: problem is longstanding. Rheumatological ROS: ongoing significant pain in knees which is stable and controlled by PRN meds. Response to treatment plan: symptoms have progressed to a point and plateaued. ROS   General ROS: negative for - chills, fatigue or fever  Ophthalmic ROS: positive for - uses glasses  ENT ROS: negative for - headaches, nasal congestion or sore throat  Respiratory ROS: no cough, shortness of breath, or wheezing  Cardiovascular ROS: no chest pain or dyspnea on exertion  Gastrointestinal ROS: no abdominal pain, change in bowel habits, or black or bloody stools  Genito-Urinary ROS: no dysuria, trouble voiding, or hematuria  Neurological ROS: positive for - weakness using walker to ambulate  Dermatological ROS: negative for - rash or skin lesion changes    All other systems reviewed and are negative.       Objective:  Vitals: 07/12/19 1301   BP: 123/73   Pulse: (!) 57   Resp: 18   Temp: 98 °F (36.7 °C)   TempSrc: Oral   SpO2: 99%   Weight: 234 lb 3.2 oz (106.2 kg)   Height: 5' (1.524 m)   PainSc:   9   PainLoc: Knee       alert, well appearing, and in no distress, oriented to person, place, and time and morbidly obese  Mental status - normal mood, behavior, speech, dress, motor activity, and thought processes  Ears - right ear with pin hole perforation  Chest - clear to auscultation, no wheezes, rales or rhonchi, symmetric air entry  Heart - normal rate, regular rhythm, normal S1, S2, no murmurs, rubs, clicks or gallops  Neurological - cranial nerves II through XII intact, antalgic gait   Musculoskeletal - abnormal exam of right knee with pain with motion    Assessment/Plan:    1. Perforation of right tympanic membrane  Care discussed; no sign of infection    2. Depression with anxiety  Continue zoloft    3. Status post bilateral knee replacements  F/u with ortho    4. Chronic pain of right knee  Scheduled for injection    5. Morbid obesity ([address]  I have reviewed/discussed the above normal BMI with the patient. I have recommended the following interventions: dietary management education, guidance, and counseling and encourage exercise . [facility] Lab review: no lab studies available for review at time of visit      I have discussed the diagnosis with the patient and the intended plan as seen in the above orders. The patient has received an after-visit summary and questions were answered concerning future plans. I have discussed medication side effects and warnings with the patient as well. I have reviewed the plan of care with the patient, accepted their input and they are in agreement with the treatment goals.

## 2019-07-12 NOTE — PROGRESS NOTES
Room #      SUBJECTIVE:    Tyrell Alegre is a 71 y.o. female who presents today for follow up for ear issues; patient was seen for an ear infection previously but now reports that she cannot hear out of the ear patient describe it as hearing her heartbeat. Patient reports knee pain at a level 9 patient reports having continuous issues with her knee and request referral to an orthopedic specialist     1. Have you been to the ER, urgent care clinic since your last visit? Hospitalized since your last visit? NO    2. Have you seen or consulted any other health care providers outside of the [address] [place] [address] since your last visit? Include any pap smears or colon screening. NO  When :  Reason:    Health Maintenance reviewed Yes    Health Maintenance Due   Topic Date Due    Shingrix Vaccine Age 49> (1 of 2) 01/31/2000    GLAUCOMA SCREENING Q2Y  07/09/2017

## 2019-07-26 ENCOUNTER — HOSPITAL ENCOUNTER (INPATIENT)
Age: 69
LOS: 2 days | Discharge: ANOTHER HOSPITAL | DRG: 550 | End: 2019-07-28
Attending: EMERGENCY MEDICINE | Admitting: HOSPITALIST
Payer: MEDICARE

## 2019-07-26 ENCOUNTER — APPOINTMENT (OUTPATIENT)
Dept: CT IMAGING | Age: 69
DRG: 550 | End: 2019-07-26
Attending: EMERGENCY MEDICINE
Payer: MEDICARE

## 2019-07-26 ENCOUNTER — TELEPHONE (OUTPATIENT)
Dept: FAMILY MEDICINE CLINIC | Age: 69
End: 2019-07-26

## 2019-07-26 ENCOUNTER — APPOINTMENT (OUTPATIENT)
Dept: GENERAL RADIOLOGY | Age: 69
DRG: 550 | End: 2019-07-26
Attending: EMERGENCY MEDICINE
Payer: MEDICARE

## 2019-07-26 ENCOUNTER — HOSPITAL ENCOUNTER (EMERGENCY)
Age: 69
Discharge: HOME | DRG: 550 | End: 2019-07-26
Attending: EMERGENCY MEDICINE | Admitting: EMERGENCY MEDICINE
Payer: MEDICARE

## 2019-07-26 ENCOUNTER — APPOINTMENT (OUTPATIENT)
Dept: VASCULAR SURGERY | Age: 69
DRG: 550 | End: 2019-07-26
Attending: EMERGENCY MEDICINE
Payer: MEDICARE

## 2019-07-26 VITALS
HEART RATE: 68 BPM | TEMPERATURE: 97.1 F | HEIGHT: 60 IN | OXYGEN SATURATION: 100 % | BODY MASS INDEX: 47.32 KG/M2 | SYSTOLIC BLOOD PRESSURE: 126 MMHG | DIASTOLIC BLOOD PRESSURE: 67 MMHG | RESPIRATION RATE: 16 BRPM | WEIGHT: 241 LBS

## 2019-07-26 DIAGNOSIS — R60.0 PEDAL EDEMA: ICD-10-CM

## 2019-07-26 DIAGNOSIS — M71.22: Primary | ICD-10-CM

## 2019-07-26 DIAGNOSIS — L02.415 ABSCESS OF RIGHT LEG: Primary | ICD-10-CM

## 2019-07-26 DIAGNOSIS — M25.562 ARTHRALGIA OF LEFT LOWER LEG: ICD-10-CM

## 2019-07-26 PROBLEM — L02.419 ABSCESS OF LEG: Status: ACTIVE | Noted: 2019-07-26

## 2019-07-26 PROBLEM — L03.90 CELLULITIS: Status: ACTIVE | Noted: 2019-07-26

## 2019-07-26 LAB
ANION GAP SERPL CALC-SCNC: 7 MMOL/L (ref 3–18)
BASOPHILS # BLD: 0 K/UL (ref 0–0.1)
BASOPHILS NFR BLD: 0 % (ref 0–2)
BUN SERPL-MCNC: 10 MG/DL (ref 7–18)
BUN/CREAT SERPL: 12 (ref 12–20)
CALCIUM SERPL-MCNC: 8.8 MG/DL (ref 8.5–10.1)
CHLORIDE SERPL-SCNC: 107 MMOL/L (ref 100–111)
CO2 SERPL-SCNC: 27 MMOL/L (ref 21–32)
CREAT SERPL-MCNC: 0.82 MG/DL (ref 0.6–1.3)
DIFFERENTIAL METHOD BLD: ABNORMAL
EOSINOPHIL # BLD: 0.1 K/UL (ref 0–0.4)
EOSINOPHIL NFR BLD: 2 % (ref 0–5)
ERYTHROCYTE [DISTWIDTH] IN BLOOD BY AUTOMATED COUNT: 14.7 % (ref 11.6–14.5)
GLUCOSE SERPL-MCNC: 102 MG/DL (ref 74–99)
HCT VFR BLD AUTO: 31.8 % (ref 35–45)
HGB BLD-MCNC: 10 G/DL (ref 12–16)
LYMPHOCYTES # BLD: 1.1 K/UL (ref 0.9–3.6)
LYMPHOCYTES NFR BLD: 16 % (ref 21–52)
MCH RBC QN AUTO: 26 PG (ref 24–34)
MCHC RBC AUTO-ENTMCNC: 31.4 G/DL (ref 31–37)
MCV RBC AUTO: 82.6 FL (ref 74–97)
MONOCYTES # BLD: 0.9 K/UL (ref 0.05–1.2)
MONOCYTES NFR BLD: 12 % (ref 3–10)
NEUTS SEG # BLD: 4.8 K/UL (ref 1.8–8)
NEUTS SEG NFR BLD: 70 % (ref 40–73)
PLATELET # BLD AUTO: 332 K/UL (ref 135–420)
PMV BLD AUTO: 9.1 FL (ref 9.2–11.8)
POTASSIUM SERPL-SCNC: 3.8 MMOL/L (ref 3.5–5.5)
RBC # BLD AUTO: 3.85 M/UL (ref 4.2–5.3)
SODIUM SERPL-SCNC: 141 MMOL/L (ref 136–145)
WBC # BLD AUTO: 6.9 K/UL (ref 4.6–13.2)

## 2019-07-26 PROCEDURE — 93005 ELECTROCARDIOGRAM TRACING: CPT

## 2019-07-26 PROCEDURE — 99284 EMERGENCY DEPT VISIT MOD MDM: CPT

## 2019-07-26 PROCEDURE — 74011250637 HC RX REV CODE- 250/637: Performed by: EMERGENCY MEDICINE

## 2019-07-26 PROCEDURE — 74011000250 HC RX REV CODE- 250: Performed by: EMERGENCY MEDICINE

## 2019-07-26 PROCEDURE — 74011250637 HC RX REV CODE- 250/637: Performed by: PHYSICIAN ASSISTANT

## 2019-07-26 PROCEDURE — 96365 THER/PROPH/DIAG IV INF INIT: CPT

## 2019-07-26 PROCEDURE — 96375 TX/PRO/DX INJ NEW DRUG ADDON: CPT

## 2019-07-26 PROCEDURE — 85025 COMPLETE CBC W/AUTO DIFF WBC: CPT

## 2019-07-26 PROCEDURE — 99283 EMERGENCY DEPT VISIT LOW MDM: CPT

## 2019-07-26 PROCEDURE — 93971 EXTREMITY STUDY: CPT

## 2019-07-26 PROCEDURE — 74011000258 HC RX REV CODE- 258: Performed by: PHYSICIAN ASSISTANT

## 2019-07-26 PROCEDURE — 73590 X-RAY EXAM OF LOWER LEG: CPT

## 2019-07-26 PROCEDURE — 65270000029 HC RM PRIVATE

## 2019-07-26 PROCEDURE — 74011250636 HC RX REV CODE- 250/636: Performed by: PHYSICIAN ASSISTANT

## 2019-07-26 PROCEDURE — 75635 CT ANGIO ABDOMINAL ARTERIES: CPT

## 2019-07-26 PROCEDURE — 80048 BASIC METABOLIC PNL TOTAL CA: CPT

## 2019-07-26 PROCEDURE — 74011636320 HC RX REV CODE- 636/320: Performed by: EMERGENCY MEDICINE

## 2019-07-26 RX ORDER — CLINDAMYCIN PHOSPHATE 600 MG/50ML
600 INJECTION INTRAVENOUS EVERY 8 HOURS
Status: DISCONTINUED | OUTPATIENT
Start: 2019-07-26 | End: 2019-07-27

## 2019-07-26 RX ORDER — LIDOCAINE 4 G/100G
1 PATCH TOPICAL EVERY 24 HOURS
Status: DISCONTINUED | OUTPATIENT
Start: 2019-07-26 | End: 2019-07-26 | Stop reason: HOSPADM

## 2019-07-26 RX ORDER — VANCOMYCIN 2 GRAM/500 ML IN 0.9 % SODIUM CHLORIDE INTRAVENOUS
2000 ONCE
Status: COMPLETED | OUTPATIENT
Start: 2019-07-26 | End: 2019-07-27

## 2019-07-26 RX ORDER — ACETAMINOPHEN 500 MG
1000 TABLET ORAL
Status: COMPLETED | OUTPATIENT
Start: 2019-07-26 | End: 2019-07-26

## 2019-07-26 RX ORDER — OXYCODONE AND ACETAMINOPHEN 5; 325 MG/1; MG/1
1 TABLET ORAL
Status: COMPLETED | OUTPATIENT
Start: 2019-07-26 | End: 2019-07-26

## 2019-07-26 RX ORDER — IBUPROFEN 400 MG/1
800 TABLET ORAL
Status: COMPLETED | OUTPATIENT
Start: 2019-07-26 | End: 2019-07-26

## 2019-07-26 RX ORDER — FENTANYL CITRATE 50 UG/ML
50 INJECTION, SOLUTION INTRAMUSCULAR; INTRAVENOUS
Status: COMPLETED | OUTPATIENT
Start: 2019-07-26 | End: 2019-07-26

## 2019-07-26 RX ADMIN — PIPERACILLIN, TAZOBACTAM 4.5 G: 4; .5 INJECTION, POWDER, LYOPHILIZED, FOR SOLUTION INTRAVENOUS at 20:54

## 2019-07-26 RX ADMIN — IBUPROFEN 800 MG: 400 TABLET ORAL at 12:24

## 2019-07-26 RX ADMIN — ACETAMINOPHEN 1000 MG: 500 TABLET, FILM COATED ORAL at 12:25

## 2019-07-26 RX ADMIN — OXYCODONE HYDROCHLORIDE AND ACETAMINOPHEN 1 TABLET: 5; 325 TABLET ORAL at 17:34

## 2019-07-26 RX ADMIN — IOPAMIDOL 123 ML: 612 INJECTION, SOLUTION INTRAVENOUS at 18:36

## 2019-07-26 RX ADMIN — FENTANYL CITRATE 50 MCG: 50 INJECTION, SOLUTION INTRAMUSCULAR; INTRAVENOUS at 20:52

## 2019-07-26 RX ADMIN — CLINDAMYCIN PHOSPHATE 600 MG: 12 INJECTION, SOLUTION INTRAVENOUS at 20:00

## 2019-07-26 NOTE — DISCHARGE INSTRUCTIONS
Patient Education      It is important to note that you had a cystic structure in the back of your knee that is likely a Baker's cyst.  However, it may be prudent to get this evaluated more thoroughly as an outpatient, as there is a very small chance it could represent a malignancy or a problem with your arteries/veins. Recommend you follow-up with your primary care physician for further evaluation of this. Leg and Ankle Edema: Care Instructions  Your Care Instructions  Swelling in the legs, ankles, and feet is called edema. It is common after you sit or stand for a while. Long plane flights or car rides often cause swelling in the legs and feet. You may also have swelling if you have to stand for long periods of time at your job. Problems with the veins in the legs (varicose veins) and changes in hormones can also cause swelling. Sometimes the swelling in the ankles and feet is caused by a more serious problem, such as heart failure, infection, blood clots, or liver or kidney disease. Follow-up care is a key part of your treatment and safety. Be sure to make and go to all appointments, and call your doctor if you are having problems. It's also a good idea to know your test results and keep a list of the medicines you take. How can you care for yourself at home? · If your doctor gave you medicine, take it as prescribed. Call your doctor if you think you are having a problem with your medicine. · Whenever you are resting, raise your legs up. Try to keep the swollen area higher than the level of your heart. · Take breaks from standing or sitting in one position. ? Walk around to increase the blood flow in your lower legs. ? Move your feet and ankles often while you stand, or tighten and relax your leg muscles. · Wear support stockings. Put them on in the morning, before swelling gets worse. · Eat a balanced diet. Lose weight if you need to. · Limit the amount of salt (sodium) in your diet.  Salt holds fluid in the body and may increase swelling. When should you call for help? Call 911 anytime you think you may need emergency care. For example, call if:    · You have symptoms of a blood clot in your lung (called a pulmonary embolism). These may include:  ? Sudden chest pain. ? Trouble breathing. ? Coughing up blood.    Call your doctor now or seek immediate medical care if:    · You have signs of a blood clot, such as:  ? Pain in your calf, back of the knee, thigh, or groin. ? Redness and swelling in your leg or groin.     · You have symptoms of infection, such as:  ? Increased pain, swelling, warmth, or redness. ? Red streaks or pus. ? A fever.    Watch closely for changes in your health, and be sure to contact your doctor if:    · Your swelling is getting worse.     · You have new or worsening pain in your legs.     · You do not get better as expected. Where can you learn more? Go to http://chelsea-austin.info/. Enter H046 in the search box to learn more about \"Leg and Ankle Edema: Care Instructions. \"  Current as of: September 23, 2018  Content Version: 12.1  © 3205-6899 [employer]. Care instructions adapted under license by [employer] (which disclaims liability or warranty for this information). If you have questions about a medical condition or this instruction, always ask your healthcare professional. [place] [address] any warranty or liability for your use of this information. Patient Education        Graham's Cyst: Care Instructions  Your Care Instructions    A Baker's cyst is a swelling behind the knee. It may cause pain or stiffness when you bend your knee or straighten it all the way. Baker's cysts are also called popliteal cysts. If you have arthritis or another condition that is the cause of the Baker's cyst, your doctor may treat that condition. A Baker's cyst may go away on its own.  If not, or if it is causing a lot of discomfort, your doctor may drain the fluid that has built up behind the knee. In some cases, a Baker's cyst is removed in surgery. There are things you can do at home, such as staying off your leg, to reduce the swelling and pain. Follow-up care is a key part of your treatment and safety. Be sure to make and go to all appointments, and call your doctor if you are having problems. It's also a good idea to know your test results and keep a list of the medicines you take. How can you care for yourself at home? · Rest your knee as much as possible. · Ask your doctor if you can take an over-the-counter pain medicine, such as acetaminophen (Tylenol), ibuprofen (Advil, Motrin), or naproxen (Aleve). Be safe with medicines. Read and follow all instructions on the label. · Use a cane, a crutch, a walker, or another device if you need help to get around. These can help rest your knees. · If you have an elastic bandage, make sure it is snug but not so tight that your leg is numb, tingles, or swells below the bandage. Ask your doctor if you can loosen the bandage if it is too tight. · Follow your doctor's instructions about how much weight you can put on your knee. · Stay at a healthy weight. Being overweight puts extra strain on your knee. When should you call for help? Call 911 anytime you think you may need emergency care. For example, call if:    · You have chest pain, are short of breath, or you cough up blood.    Call your doctor now or seek immediate medical care if:    · You have new or worse pain.     · Your foot is cool or pale or changes color.     · You have tingling, weakness, or numbness in your foot or toes.     · You have signs of a blood clot in your leg (called a deep vein thrombosis), such as:  ? Pain in your calf, back of the knee, thigh, or groin. ?  Redness or swelling in your leg.    Watch closely for changes in your health, and be sure to contact your doctor if:    · You do not get better as expected. Where can you learn more? Go to http://chelsea-austin.info/. Enter W802 in the search box to learn more about \"Baker's Cyst: Care Instructions. \"  Current as of: September 20, 2018  Content Version: 12.1  © 6391-1562 [employer]. Care instructions adapted under license by [employer] (which disclaims liability or warranty for this information). If you have questions about a medical condition or this instruction, always ask your healthcare professional. [address] any warranty or liability for your use of this information.

## 2019-07-26 NOTE — ED TRIAGE NOTES
Pt seen today at [phone] for c/o right leg pain behind knee. Had xray and PVL study done. Neg results. Has cyst behind knee. Returned for pain .

## 2019-07-26 NOTE — TELEPHONE ENCOUNTER
Pt called in stating that her legs is painful and have a lot of swelling. Nurse advised patient to go to the ER. Pt states she cannot not afford the ER. Nurse apologized but advised patient that due to symptoms she will need immediate attention. Pt verbalized understanding, this encounter will be closed.

## 2019-07-26 NOTE — ED PROVIDER NOTES
EMERGENCY DEPARTMENT HISTORY AND PHYSICAL EXAM      Date: 7/26/2019  Patient Name: Carlita Kelly    History of Presenting Illness     Chief Complaint   Patient presents with    Knee Pain        History (Context): Carlita Kelly is a 71 y.o. [place] with chronic lymphedema and a complex set of comorbid conditions as noted below who presents with gradual onset, progressive, severe right lower extremity pain in the area of her calf associated with severe edema. The pain is made worse with walking. The patient states she can barely walk. On review of systems, the patient denies fever, chills, rashes, foot pain. PCP: Raissa Ambrose MD    Current Outpatient Medications   Medication Sig Dispense Refill    sertraline (ZOLOFT) 50 mg tablet Take 1 Tab by mouth daily. 90 Tab 3    neomycin-polymyxin-hydrocortisone, buffered, (PEDIOTIC) 3.5-10,000-1 mg/mL-unit/mL-% otic suspension Administer 4 Drops in right ear four (4) times daily. 10 mL 0    alendronate (FOSAMAX) 70 mg tablet Take 1 Tab by mouth every seven (7) days. 12 Tab 1    ALPRAZolam (XANAX) 0.5 mg tablet Take 1 Tab by mouth three (3) times daily as needed for Anxiety.  Max Daily Amount: 1.5 mg. 30 Tab 0    furosemide (LASIX) 40 mg tablet TAKE 1 TABLET BY MOUTH ONCE DAILY 30 Tab 2    losartan (COZAAR) 25 mg tablet TAKE 1 TABLET BY MOUTH ONCE DAILY 90 Tab 1    potassium chloride SR (KLOR-CON 10) 10 mEq tablet TAKE 1 TABLET BY MOUTH ONCE DAILY 90 Tab 1    ibuprofen (MOTRIN) 600 mg tablet TAKE 1 TABLET BY MOUTH EVERY 8 HOURS AS NEEDED FOR PAIN 30 Tab 2    atorvastatin (LIPITOR) 10 mg tablet TAKE 1 TABLET BY MOUTH ONCE DAILY 90 Tab 0    pantoprazole (PROTONIX) 40 mg tablet TAKE ONE TABLET BY MOUTH ONCE DAILY 90 Tab 3       Past History     Past Medical History:  Past Medical History:   Diagnosis Date    Degenerative disk disease     Depression     Depression     Diffuse idiopathic skeletal hyperostosis     Fracture of leg     Gallstone     Lymphedema of leg 1975    bilateral lower legs    [facility]bid obesity [address] [address]     Pulmonary arterial hypertension (HCC)     MANPREET (stress urinary incontinence, female)        Past Surgical History:  Past Surgical History:   Procedure Laterality Date    HX HYSTERECTOMY      HX KNEE REPLACEMENT      HX ORTHOPAEDIC      right shoulder and right knee replaced x 2, left knee replaced as well       Family History:  Family History   Problem Relation Age of Onset    Heart Disease Father     Diabetes Father        Social History:  Social History     Tobacco Use    Smoking status: Never Smoker    Smokeless tobacco: Never Used   Substance Use Topics    Alcohol use: No     Alcohol/week: 0.0 standard drinks    Drug use: No       Allergies: Allergies   Allergen Reactions    Adhesive Other (comments)     Skin burns - red spots    Lisinopril Other (comments)    Nitrofurantoin Other (comments)       PMH, PSH, family history, social history, allergies reviewed with the patient with significant items noted above. Review of Systems   As per HPI, otherwise reviewed and negative. Physical Exam     Vitals:    07/26/19 1047   BP: 126/67   Pulse: 68   Resp: 16   Temp: 97.1 °F (36.2 °C)   SpO2: 100%   Weight: 109.3 kg (241 lb)   Height: 5' (1.524 m)       Gen: Well-appearing, however in clear pain tearful  HEENT: Normocephalic, sclera anicteric  Cardiovascular: Normal rate, regular rhythm, no murmurs, rubs, gallops. Pulses intact and equal distally. Pulmonary: No respiratory distress. No stridor. Clear lungs. ABD: Soft, nontender, nondistended. Neuro: Alert. Normal speech. Normal mentation. Psych: Normal thought content and thought processes. : No CVA tenderness  EXT: Prominent bilateral leg swelling with right much greater than left edema. Tenderness to palpation in the mid calf. Moves all extremities well. No cyanosis or clubbing. Skin: Warm and well-perfused.   Cap refill is normal.          Diagnostic Study Results     Labs -     Recent Results (from the past 12 hour(s))   CBC WITH AUTOMATED DIFF    Collection Time: 07/26/19  5:35 PM   Result Value Ref Range    WBC 6.9 4.6 - 13.2 K/uL    RBC 3.85 (L) 4.20 - 5.30 M/uL    HGB 10.0 (L) 12.0 - 16.0 g/dL    HCT 31.8 (L) 35.0 - 45.0 %    MCV 82.6 74.0 - 97.0 FL    MCH 26.0 24.0 - 34.0 PG    MCHC 31.4 31.0 - 37.0 g/dL    RDW 14.7 (H) 11.6 - 14.5 %    PLATELET 357 315 - 109 K/uL    MPV 9.1 (L) 9.2 - 11.8 FL    NEUTROPHILS 70 40 - 73 %    LYMPHOCYTES 16 (L) 21 - 52 %    MONOCYTES 12 (H) 3 - 10 %    EOSINOPHILS 2 0 - 5 %    BASOPHILS 0 0 - 2 %    ABS. NEUTROPHILS 4.8 1.8 - 8.0 K/UL    ABS. LYMPHOCYTES 1.1 0.9 - 3.6 K/UL    ABS. MONOCYTES 0.9 0.05 - 1.2 K/UL    ABS. EOSINOPHILS 0.1 0.0 - 0.4 K/UL    ABS. BASOPHILS 0.0 0.0 - 0.1 K/UL    DF AUTOMATED     METABOLIC PANEL, BASIC    Collection Time: 07/26/19  5:35 PM   Result Value Ref Range    Sodium 141 136 - 145 mmol/L    Potassium 3.8 3.5 - 5.5 mmol/L    Chloride 107 100 - 111 mmol/L    CO2 27 21 - 32 mmol/L    Anion gap 7 3.0 - 18 mmol/L    Glucose 102 (H) 74 - 99 mg/dL    BUN 10 7.0 - 18 MG/DL    Creatinine 0.82 0.6 - 1.3 MG/DL    BUN/Creatinine ratio 12 12 - 20      GFR est AA >60 >60 ml/min/1.73m2    GFR est non-AA >60 >60 ml/min/1.73m2    Calcium 8.8 8.5 - 10.1 MG/DL       Radiologic Studies -   XR TIB/FIB RT   Final Result   IMPRESSION:      Right knee arthroplasty in situ, no osseous abnormality. Obesity. Calcified subcutaneous phleboliths. CT Results  (Last 48 hours)    None        CXR Results  (Last 48 hours)    None            Medical Decision Making   I am the first provider for this patient. I reviewed the vital signs, available nursing notes, past medical history, past surgical history, family history and social history. Vital Signs-Reviewed the patient's vital signs.        Records Reviewed: Personally, on initial evaluation    MDM:   Patient presents with leg pain and edema. Exam significant for severe lymphedema and exquisite tenderness. DDX considered: DVT, lymphedema  DDX thought to be less likely but also considered due to high risk condition: Compartment syndrome, necrotizing fasciitis    Patient condition on initial evaluation: Stable    Plan:   Pain Control       Orders as below:  Orders Placed This Encounter    XR TIB/FIB RT    acetaminophen (TYLENOL) tablet 1,000 mg    ibuprofen (MOTRIN) tablet 800 mg    DISCONTD: lidocaine 4 % patch 1 Patch   DVT study was performed and negative on the leg. The radiology tech did note a fluid collection behind the knee. Is prompted me to order an x-ray. Placed Ace wrap on the patient, as compression stockings will not fit. Pain somewhat improved with lidocaine patch. Will tell the patient to use the RICE formula. Patient condition at time of disposition: Stable  DISCHARGE NOTE:   Pt has been reexamined. Patient has no new complaints, changes, or physical findings. Care plan outlined and precautions discussed. Results were reviewed with the patient. All medications were reviewed with the patient; will d/c home with changes to medications. All of pt's questions and concerns were addressed. Alarm symptoms and return precautions associated with chief complaint and evaluation were reviewed with the patient in detail. The patient demonstrated adequate understanding. Patient was instructed and agrees to follow up with PCP, as well as to return to the ED upon further deterioration. Patient is ready to go home.   The patient is happy with this plan    Follow-up Information     Follow up With Specialties Details Why Contact Info    Kayode Ventura MD Internal Medicine   [address] [facility] [phone]      [facility] EMERGENCY DEPT Emergency Medicine  As needed, If symptoms worsen [address] [facility] [address].  [phone]          Discharge Medication List as of 7/26/2019  1:49 PM Disposition: Stable    Diagnosis     Clinical Impression:   1. Synovial cyst, popliteal, left    2. Pedal edema    3. Arthralgia of left lower leg        Signed,  Lissa Milner MD  Emergency Physician  [facility]    As a voice dictation software was utilized to dictate this note, minor word transpositions can occur. I apologize for confusing wording and typographic errors. Please feel free to contact me for clarification.

## 2019-07-27 ENCOUNTER — APPOINTMENT (OUTPATIENT)
Dept: GENERAL RADIOLOGY | Age: 69
DRG: 550 | End: 2019-07-27
Attending: PHYSICIAN ASSISTANT
Payer: MEDICARE

## 2019-07-27 PROBLEM — M71.21 POPLITEAL CYST, RIGHT: Status: ACTIVE | Noted: 2019-07-26

## 2019-07-27 LAB
APPEARANCE FLD: ABNORMAL
COLOR FLD: YELLOW
CRP SERPL-MCNC: 7.2 MG/DL (ref 0–0.3)
EOSINOPHIL NFR FLD MANUAL: 0 %
ERYTHROCYTE [SEDIMENTATION RATE] IN BLOOD: 69 MM/HR (ref 0–30)
LYMPHOCYTES NFR FLD: 0 %
MONOCYTES NFR FLD: 3 %
NEUTROPHILS NFR FLD: 97 %
NEUTS BAND # FLD: 0 %
NUC CELL # FLD: ABNORMAL /CU MM
RBC # FLD: 3333 /CU MM
SPECIMEN SOURCE FLD: ABNORMAL

## 2019-07-27 PROCEDURE — 87070 CULTURE OTHR SPECIMN AEROBIC: CPT

## 2019-07-27 PROCEDURE — 87075 CULTR BACTERIA EXCEPT BLOOD: CPT

## 2019-07-27 PROCEDURE — 74011250636 HC RX REV CODE- 250/636: Performed by: PHYSICIAN ASSISTANT

## 2019-07-27 PROCEDURE — 89051 BODY FLUID CELL COUNT: CPT

## 2019-07-27 PROCEDURE — 74011000258 HC RX REV CODE- 258: Performed by: HOSPITALIST

## 2019-07-27 PROCEDURE — 0S9C3ZX DRAINAGE OF RIGHT KNEE JOINT, PERCUTANEOUS APPROACH, DIAGNOSTIC: ICD-10-PCS | Performed by: RADIOLOGY

## 2019-07-27 PROCEDURE — 87040 BLOOD CULTURE FOR BACTERIA: CPT

## 2019-07-27 PROCEDURE — 36415 COLL VENOUS BLD VENIPUNCTURE: CPT

## 2019-07-27 PROCEDURE — 74011250637 HC RX REV CODE- 250/637: Performed by: HOSPITALIST

## 2019-07-27 PROCEDURE — 65270000029 HC RM PRIVATE

## 2019-07-27 PROCEDURE — 93005 ELECTROCARDIOGRAM TRACING: CPT

## 2019-07-27 PROCEDURE — 74011250636 HC RX REV CODE- 250/636: Performed by: HOSPITALIST

## 2019-07-27 PROCEDURE — 85652 RBC SED RATE AUTOMATED: CPT

## 2019-07-27 PROCEDURE — 86140 C-REACTIVE PROTEIN: CPT

## 2019-07-27 PROCEDURE — 77002 NEEDLE LOCALIZATION BY XRAY: CPT

## 2019-07-27 PROCEDURE — 89060 EXAM SYNOVIAL FLUID CRYSTALS: CPT

## 2019-07-27 RX ORDER — ALPRAZOLAM 0.5 MG/1
0.5 TABLET ORAL
Status: DISCONTINUED | OUTPATIENT
Start: 2019-07-27 | End: 2019-07-28 | Stop reason: HOSPADM

## 2019-07-27 RX ORDER — ONDANSETRON 2 MG/ML
4 INJECTION INTRAMUSCULAR; INTRAVENOUS
Status: DISCONTINUED | OUTPATIENT
Start: 2019-07-27 | End: 2019-07-28 | Stop reason: HOSPADM

## 2019-07-27 RX ORDER — VANCOMYCIN/0.9 % SOD CHLORIDE 1.5G/250ML
1500 PLASTIC BAG, INJECTION (ML) INTRAVENOUS
Status: DISCONTINUED | OUTPATIENT
Start: 2019-07-27 | End: 2019-07-28 | Stop reason: HOSPADM

## 2019-07-27 RX ORDER — ACETAMINOPHEN 325 MG/1
650 TABLET ORAL
Status: DISCONTINUED | OUTPATIENT
Start: 2019-07-27 | End: 2019-07-28 | Stop reason: HOSPADM

## 2019-07-27 RX ORDER — SERTRALINE HYDROCHLORIDE 50 MG/1
50 TABLET, FILM COATED ORAL DAILY
Status: DISCONTINUED | OUTPATIENT
Start: 2019-07-27 | End: 2019-07-28 | Stop reason: HOSPADM

## 2019-07-27 RX ORDER — LIDOCAINE HYDROCHLORIDE 10 MG/ML
1-5 INJECTION INFILTRATION; PERINEURAL
Status: ACTIVE | OUTPATIENT
Start: 2019-07-27 | End: 2019-07-28

## 2019-07-27 RX ORDER — ATORVASTATIN CALCIUM 10 MG/1
10 TABLET, FILM COATED ORAL
Status: DISCONTINUED | OUTPATIENT
Start: 2019-07-27 | End: 2019-07-28 | Stop reason: HOSPADM

## 2019-07-27 RX ORDER — SODIUM CHLORIDE 9 MG/ML
50 INJECTION, SOLUTION INTRAVENOUS CONTINUOUS
Status: DISCONTINUED | OUTPATIENT
Start: 2019-07-27 | End: 2019-07-28 | Stop reason: HOSPADM

## 2019-07-27 RX ORDER — PANTOPRAZOLE SODIUM 40 MG/1
40 TABLET, DELAYED RELEASE ORAL
Status: DISCONTINUED | OUTPATIENT
Start: 2019-07-28 | End: 2019-07-28 | Stop reason: HOSPADM

## 2019-07-27 RX ORDER — OXYCODONE AND ACETAMINOPHEN 7.5; 325 MG/1; MG/1
1 TABLET ORAL
Status: DISCONTINUED | OUTPATIENT
Start: 2019-07-27 | End: 2019-07-28 | Stop reason: HOSPADM

## 2019-07-27 RX ORDER — LANOLIN ALCOHOL/MO/W.PET/CERES
3 CREAM (GRAM) TOPICAL
Status: DISCONTINUED | OUTPATIENT
Start: 2019-07-27 | End: 2019-07-28 | Stop reason: HOSPADM

## 2019-07-27 RX ORDER — ENOXAPARIN SODIUM 100 MG/ML
40 INJECTION SUBCUTANEOUS EVERY 24 HOURS
Status: DISCONTINUED | OUTPATIENT
Start: 2019-07-27 | End: 2019-07-28 | Stop reason: HOSPADM

## 2019-07-27 RX ORDER — LOSARTAN POTASSIUM 25 MG/1
25 TABLET ORAL DAILY
Status: DISCONTINUED | OUTPATIENT
Start: 2019-07-27 | End: 2019-07-28 | Stop reason: HOSPADM

## 2019-07-27 RX ADMIN — ATORVASTATIN CALCIUM 10 MG: 10 TABLET, FILM COATED ORAL at 21:13

## 2019-07-27 RX ADMIN — MELATONIN TAB 3 MG 3 MG: 3 TAB at 03:41

## 2019-07-27 RX ADMIN — SERTRALINE HYDROCHLORIDE 50 MG: 50 TABLET ORAL at 11:54

## 2019-07-27 RX ADMIN — PIPERACILLIN, TAZOBACTAM 4.5 G: 4; .5 INJECTION, POWDER, LYOPHILIZED, FOR SOLUTION INTRAVENOUS at 11:54

## 2019-07-27 RX ADMIN — PIPERACILLIN, TAZOBACTAM 4.5 G: 4; .5 INJECTION, POWDER, LYOPHILIZED, FOR SOLUTION INTRAVENOUS at 03:22

## 2019-07-27 RX ADMIN — CLINDAMYCIN PHOSPHATE 600 MG: 12 INJECTION, SOLUTION INTRAVENOUS at 05:56

## 2019-07-27 RX ADMIN — PIPERACILLIN, TAZOBACTAM 4.5 G: 4; .5 INJECTION, POWDER, LYOPHILIZED, FOR SOLUTION INTRAVENOUS at 17:10

## 2019-07-27 RX ADMIN — VANCOMYCIN HYDROCHLORIDE 1500 MG: 10 INJECTION, POWDER, LYOPHILIZED, FOR SOLUTION INTRAVENOUS at 21:28

## 2019-07-27 RX ADMIN — ENOXAPARIN SODIUM 40 MG: 40 INJECTION SUBCUTANEOUS at 11:54

## 2019-07-27 RX ADMIN — VANCOMYCIN HYDROCHLORIDE 2000 MG: 10 INJECTION, POWDER, LYOPHILIZED, FOR SOLUTION INTRAVENOUS at 03:50

## 2019-07-27 RX ADMIN — MELATONIN TAB 3 MG 3 MG: 3 TAB at 21:13

## 2019-07-27 RX ADMIN — SODIUM CHLORIDE 50 ML/HR: 900 INJECTION, SOLUTION INTRAVENOUS at 12:10

## 2019-07-27 RX ADMIN — LOSARTAN POTASSIUM 25 MG: 25 TABLET, FILM COATED ORAL at 11:54

## 2019-07-27 NOTE — CONSULTS
Consult Note  ADDENDUM: Case discussed with Dr Corene Hatchet and radiology. The radiologist feels that because with fluid collection is periarticular that these findings are likely consistent with a Baker's Cyst.This correlates to Dr Weber Lack concern that her moderate effusion may be due to an infection knee joint with a past history of revision for a septic total knee. Will order an ultrasound and aspiration of the knee and send fluid for synovial analysis. Cell count with diff, crystals, anaerobic cultures and gram stain and culture. Will order sed rate, crp, and blood cultures. If infection of the joint is present, she would require transfer to [facility] for management by Dr Valentín Neil. Assessment:    1. Right leg cellulitis with possible multiple calf abscesses       PLAN:    1. Will discuss case with attending to determine further treatment plan. Continue abx for cellulitis. No clinical findings to suggest septic arthritis. HPI: Mary Dominguez is a 71 y.o. female patient with lymphedema presents with 3-4 days days of right calf pain. She was seen in the ED initially and sent home after having a negative PVL. She presented back due to pain and a CT was done suggesting several abscess and diffuse cellulitis. She was admitted and started on IV antibiotics. She has a remote history of a right total knee replacement by Dr Valentín Neil.  She reports no knee pain,    Past Medical History:   Diagnosis Date    Degenerative disk disease     Depression     Depression     Diffuse idiopathic skeletal hyperostosis     Fracture of leg     Gallstone     Lymphedema of leg 1975    bilateral lower legs    Morbid obesity ([address]     Pulmonary arterial hypertension (HCC)     MANPREET (stress urinary incontinence, female)      Past Surgical History:   Procedure Laterality Date    HX HYSTERECTOMY      HX KNEE REPLACEMENT      HX ORTHOPAEDIC      right shoulder and right knee replaced x 2, left knee replaced as well Prior to Admission medications    Medication Sig Start Date End Date Taking? Authorizing Provider   sertraline (ZOLOFT) 50 mg tablet Take 1 Tab by mouth daily. 7/12/19   Margi Hassan MD   neomycin-polymyxin-hydrocortisone, buffered, (PEDIOTIC) 3.5-10,000-1 mg/mL-unit/mL-% otic suspension Administer 4 Drops in right ear four (4) times daily. 5/28/19   Shashi Wallace MD   alendronate (FOSAMAX) 70 mg tablet Take 1 Tab by mouth every seven (7) days. 5/16/19   Shashi Wallace MD   ALPRAZolam [facility]) 0.5 mg tablet Take 1 Tab by mouth three (3) times daily as needed for Anxiety.  Max Daily Amount: 1.5 mg. 5/16/19   Shashi Wallace MD   furosemide (LASIX) 40 mg tablet TAKE 1 TABLET BY MOUTH ONCE DAILY 4/19/19   Chad Crocker MD   losartan (COZAAR) 25 mg tablet TAKE 1 TABLET BY MOUTH ONCE DAILY 4/4/19   Chad Crocker MD   potassium chloride SR (KLOR-CON 10) 10 mEq tablet TAKE 1 TABLET BY MOUTH ONCE DAILY 4/4/19   Chad Crocker MD   ibuprofen (MOTRIN) 600 mg tablet TAKE 1 TABLET BY MOUTH EVERY 8 HOURS AS NEEDED FOR PAIN 4/1/19   Chad Crocker MD   atorvastatin (LIPITOR) 10 mg tablet TAKE 1 TABLET BY MOUTH ONCE DAILY 1/29/19   Chad Crocker MD   pantoprazole (PROTONIX) 40 mg tablet TAKE ONE TABLET BY MOUTH ONCE DAILY 12/30/18   Chad Crocker MD     Allergies   Allergen Reactions    Adhesive Other (comments)     Skin burns - red spots    Lisinopril Other (comments)    Nitrofurantoin Other (comments)     Social History     Socioeconomic History    Marital status:      Spouse name: Not on file    Number of children: Not on file    Years of education: Not on file    Highest education level: Not on file   Occupational History    Not on file   Social Needs    Financial resource strain: Not on file    Food insecurity:     Worry: Not on file     Inability: Not on file    Transportation needs:     Medical: Not on file     Non-medical: Not on file   Tobacco Use    Smoking status: Never Smoker    Smokeless tobacco: Never Used   Substance and Sexual Activity    Alcohol use: No     Alcohol/week: 0.0 standard drinks    Drug use: No    Sexual activity: Not on file   Lifestyle    Physical activity:     Days per week: Not on file     Minutes per session: Not on file    Stress: Not on file   Relationships    Social connections:     Talks on phone: Not on file     Gets together: Not on file     Attends Holiness service: Not on file     Active member of club or organization: Not on file     Attends meetings of clubs or organizations: Not on file     Relationship status: Not on file    Intimate partner violence:     Fear of current or ex partner: Not on file     Emotionally abused: Not on file     Physically abused: Not on file     Forced sexual activity: Not on file   Other Topics Concern    Not on file   Social History Narrative    Not on file       Blood pressure 113/76, pulse (!) 46, temperature 97.5 °F (36.4 °C), resp. rate 16, height 5' (1.524 m), weight 109.3 kg (241 lb), SpO2 98 %. CBC w/Diff   Lab Results   Component Value Date/Time    WBC 6.9 07/26/2019 05:35 PM    RBC 3.85 (L) 07/26/2019 05:35 PM    HCT 31.8 (L) 07/26/2019 05:35 PM          Physical Assessment:  General: in no apparent distress   Extremities:  Significant lymphedema bilateral lower legs. Mild overlying redness to the posterior right calf. Compartments are soft. No significant calf tenderness to palpation. No fluctuance presents. Anterior right knee incision well healed with no overlying redness. She will range the knee actively with no pain. Moderate joint effusion. Dressing:  none ortho   DVT Exam:   No exam evidence to suggest DVT. Compartments soft and NT. Radiology:   PVL RLE:  Right Lower Venous     No evidence of deep vein thrombosis in the right lower extremity.      The common femoral, profunda femoral, femoral, proximal femoral, mid femoral, distal femoral, popliteal and saphenous femoral junction vein(s) were imaged in the transverse and longitudinal planes. The vessels showed normal color filling and compressibility. Doppler interrogation of the veins showed phasic and spontaneous flow. The right posterior tibial artery has biphasic waveforms. Not able to visualize the posterior tibial or peroneal vein in the calf with B-mode due to body habitus however, was able to show patency with color flow in the posterior tibial vein in the lower calf. Mixed echogenic collection in the right popliteal fossa measuring approximately 4.4 x 15 cm long that may be c/w a Baker's cyst.   Left Lower Venous     For comparison purposes, the left common femoral vein was briefly interrogated. These vein demonstrates normal color filing and compressibility. Doppler flow was phasic and spontaneous. CTA bilateral LE:  1. No evidence of active contrast extravasation from the lower extremity  arteries. No evidence of pseudoaneurysm formation. Patent bilateral popliteal  arteries.     2. Patent three-vessel runoff to the level of the right ankle, with a two-vessel  runoff to the level of the left ankle with the posterior tibial artery not  opacified beyond the level of the tibioperoneal trunk.     3. Postsurgical changes involving each femoral shaft along with bilateral  indwelling knee arthroplasties. > On the right, there is a moderate-sized joint effusion with  periarticular/intramuscular fluid collections which are difficult to  characterize given marked artifact. Pertinently, no evidence of soft tissue  emphysema is present.     > On the left, no evidence of joint effusion or periarticular fluid collection  is present.     4. Extensive skin thickening and subcutaneous edema throughout the lower  extremities bilaterally, potentially reflecting sequela of lymphedema, with or  without associated cellulitis. Evidence of prior mid femoral osteotomy/Glidewire fixation.  There are bilateral  indwelling knee arthroplasties. On the right, there is a moderate size joint  effusion with somewhat ill-defined periarticular fluid collection which is noted  to dissect into the lateral and medial heads of the gastrocnemius as well as  into the substance of the soleus muscle belly. Notably, these findings are quite  limited in characterization secondary to extensive artifact. There is  considerable skin thickening and subcutaneous edema noted throughout both  extremities, right more pronounced than left. No findings of periprosthetic  fracture demonstrated. No soft tissue gas demonstrated. Numerous dermal  calcifications noted.          Federico Ham PA-C  7/27/2019  Office [phone]  Cell [phone]

## 2019-07-27 NOTE — ED PROVIDER NOTES
[facility] EMERGENCY DEPT      8:03 PM    Date: 7/26/2019  Patient Name: Romana Blumenthal    History of Presenting Illness     Chief Complaint   Patient presents with    Leg Pain       71 y.o. female with noted past medical history including obesity and lymphedema presents the ED complaining of right lower leg pain/swelling worsening for the past 2 to 3 days. She states she was seen here earlier today, had a negative PVL, was discharged home with an Ace wrap, but had worsening of her pain. Describes it as constant, severe, and throbbing. She notes worsening of the swelling to her right leg. She denies any fever, chills, numbness, weakness, SOB, CP, other symptoms at this time. Patient denies any other associated signs or symptoms. Patient denies any other complaints. Nursing notes regarding the HPI and triage nursing notes were reviewed. Prior medical records were reviewed. Current Facility-Administered Medications   Medication Dose Route Frequency Provider Last Rate Last Dose    clindamycin (CLEOCIN) 600mg D5W 50mL IVPB (premix)  600 mg IntraVENous Q8H Maria R Roldan PA        piperacillin-tazobactam (ZOSYN) 4.5 g in 0.9% sodium chloride (MBP/ADV) 100 mL MBP  4.5 g IntraVENous Q6H Maria R Roldan  mL/hr at 07/26/19 2054 4.5 g at 07/26/19 2054    vancomycin (VANCOCIN) 2000 mg in  ml infusion  2,000 mg IntraVENous ONCE [place], [place]        VANCOMYCIN INFORMATION NOTE   Other Rx Dosing/Monitoring Irais Sullivan MD         Current Outpatient Medications   Medication Sig Dispense Refill    sertraline (ZOLOFT) 50 mg tablet Take 1 Tab by mouth daily. 90 Tab 3    neomycin-polymyxin-hydrocortisone, buffered, (PEDIOTIC) 3.5-10,000-1 mg/mL-unit/mL-% otic suspension Administer 4 Drops in right ear four (4) times daily. 10 mL 0    alendronate (FOSAMAX) 70 mg tablet Take 1 Tab by mouth every seven (7) days.  12 Tab 1    ALPRAZolam (XANAX) 0.5 mg tablet Take 1 Tab by mouth three (3) times daily as needed for Anxiety. Max Daily Amount: 1.5 mg. 30 Tab 0    furosemide (LASIX) 40 mg tablet TAKE 1 TABLET BY MOUTH ONCE DAILY 30 Tab 2    losartan (COZAAR) 25 mg tablet TAKE 1 TABLET BY MOUTH ONCE DAILY 90 Tab 1    potassium chloride SR (KLOR-CON 10) 10 mEq tablet TAKE 1 TABLET BY MOUTH ONCE DAILY 90 Tab 1    ibuprofen (MOTRIN) 600 mg tablet TAKE 1 TABLET BY MOUTH EVERY 8 HOURS AS NEEDED FOR PAIN 30 Tab 2    atorvastatin (LIPITOR) 10 mg tablet TAKE 1 TABLET BY MOUTH ONCE DAILY 90 Tab 0    pantoprazole (PROTONIX) 40 mg tablet TAKE ONE TABLET BY MOUTH ONCE DAILY 90 Tab 3       Past History     Past Medical History:  Past Medical History:   Diagnosis Date    Degenerative disk disease     Depression     Depression     Diffuse idiopathic skeletal hyperostosis     Fracture of leg     Gallstone     Lymphedema of leg 1975    bilateral lower legs    Morbid obesity (HCC)     Pulmonary arterial hypertension (HCC)     MANPREET (stress urinary incontinence, female)        Past Surgical History:  Past Surgical History:   Procedure Laterality Date    HX HYSTERECTOMY      HX KNEE REPLACEMENT      HX ORTHOPAEDIC      right shoulder and right knee replaced x 2, left knee replaced as well       Family History:  Family History   Problem Relation Age of Onset    Heart Disease Father     Diabetes Father        Social History:  Social History     Tobacco Use    Smoking status: Never Smoker    Smokeless tobacco: Never Used   Substance Use Topics    Alcohol use: No     Alcohol/week: 0.0 standard drinks    Drug use: No       Allergies: Allergies   Allergen Reactions    Adhesive Other (comments)     Skin burns - red spots    Lisinopril Other (comments)    Nitrofurantoin Other (comments)       Patient's primary care provider (as noted in EPIC):  Inez Watkins MD    Review of Systems   Constitutional:  Denies malaise, fever, chills. Head:  Denies injury.    Face:  Denies injury or pain.   Cardiac:  Denies chest pain or palpitations. Respiratory:  Denies cough, wheezing, difficulty breathing, shortness of breath. Extremity/MS: + right lower leg pain/swelling. Denies injury. Neuro:  Denies headache, LOC, dizziness, neurologic symptoms/deficits/paresthesias. Skin: Denies injury, rash, itching or skin changes. All other systems negative as reviewed. Visit Vitals  /65 (BP 1 Location: Right arm, BP Patient Position: At rest)   Pulse 98   Temp 97.4 °F (36.3 °C)   Resp 16   Ht 5' (1.524 m)   Wt 109.3 kg (241 lb)   SpO2 100%   BMI 47.07 kg/m²       PHYSICAL EXAM:    CONSTITUTIONAL:  Alert, in no apparent distress;  well developed;  well nourished. HEAD:  Normocephalic, atraumatic. EYES:  EOMI. Non-icteric sclera. Normal conjunctiva. ENTM:  Mouth: mucous membranes moist.  NECK:  Supple  RESPIRATORY:  Chest clear, equal breath sounds, good air movement. CARDIOVASCULAR:  Regular rate and rhythm. No murmurs, rubs, or gallops. GI:  Normal bowel sounds, abdomen soft and non-tender. No rebound or guarding. BACK:  Non-tender. UPPER EXT:  Normal inspection. LOWER EXT: Edema present, tenderness to palpation to right lower extremity with minimal erythema noted to posterior aspect, 5 out of 5 strength throughout. Doppler performed by RN, pulses obtained. NEURO:  Moves all four extremities, and grossly normal motor exam.  SKIN:  See lower extremity. PSYCH:  Alert and normal affect. DIFFERENTIAL DIAGNOSES/ MEDICAL DECISION MAKING:  Acute arterial occlusion, dependent edema, phlebitis, cellulitis, swelling from venous insufficiency, chronic lower extremity edema, nephrotic syndrome, arthritis, other etiologies versus a combination of the above.     Recent Results (from the past 12 hour(s))   CBC WITH AUTOMATED DIFF    Collection Time: 07/26/19  5:35 PM   Result Value Ref Range    WBC 6.9 4.6 - 13.2 K/uL    RBC 3.85 (L) 4.20 - 5.30 M/uL    HGB 10.0 (L) 12.0 - 16.0 g/dL    HCT 31.8 (L) 35.0 - 45.0 %    MCV 82.6 74.0 - 97.0 FL    MCH 26.0 24.0 - 34.0 PG    MCHC 31.4 31.0 - 37.0 g/dL    RDW 14.7 (H) 11.6 - 14.5 %    PLATELET 383 391 - 654 K/uL    MPV 9.1 (L) 9.2 - 11.8 FL    NEUTROPHILS 70 40 - 73 %    LYMPHOCYTES 16 (L) 21 - 52 %    MONOCYTES 12 (H) 3 - 10 %    EOSINOPHILS 2 0 - 5 %    BASOPHILS 0 0 - 2 %    ABS. NEUTROPHILS 4.8 1.8 - 8.0 K/UL    ABS. LYMPHOCYTES 1.1 0.9 - 3.6 K/UL    ABS. MONOCYTES 0.9 0.05 - 1.2 K/UL    ABS. EOSINOPHILS 0.1 0.0 - 0.4 K/UL    ABS. BASOPHILS 0.0 0.0 - 0.1 K/UL    DF AUTOMATED     METABOLIC PANEL, BASIC    Collection Time: 07/26/19  5:35 PM   Result Value Ref Range    Sodium 141 136 - 145 mmol/L    Potassium 3.8 3.5 - 5.5 mmol/L    Chloride 107 100 - 111 mmol/L    CO2 27 21 - 32 mmol/L    Anion gap 7 3.0 - 18 mmol/L    Glucose 102 (H) 74 - 99 mg/dL    BUN 10 7.0 - 18 MG/DL    Creatinine 0.82 0.6 - 1.3 MG/DL    BUN/Creatinine ratio 12 12 - 20      GFR est AA >60 >60 ml/min/1.73m2    GFR est non-AA >60 >60 ml/min/1.73m2    Calcium 8.8 8.5 - 10.1 MG/DL      CT Impression:   Knee replacement hardware severely limits soft tissue evaluation at the level of the knees. - Right knee effusion and multiple intramuscular fluid collections in the right lower leg predominantly in the soleus/gastrocnemius musculature, concerning for abscesses. - Extensive bilateral lower extremity cellulitis, more severe on the right. - The left PT artery is absent or diminutive on the left, normal on the right. - No acute findings in the abdomen or pelvis. Zosyn, Clinda, Vanco ordered as well as fentanyl. 8:31 PM consult with hospitalist, Dr. Sid Da Silva, who will admit the patient. He is requesting consult with surgery. 8:38 PM consult with general surgeon, Dr. Tyler Finney, who will consult on the patient. 10:05 PM consult with JASIEL Bright from orthopedics, who states they will consult on the patient. She is requesting the patient be n.p.o. after midnight.     Vitals and labs look well.     Diagnosis:   1. Abscess of right leg      Disposition: Admission    Patient's Medications   Start Taking    No medications on file   Continue Taking    ALENDRONATE (FOSAMAX) 70 MG TABLET    Take 1 Tab by mouth every seven (7) days. ALPRAZOLAM (XANAX) 0.5 MG TABLET    Take 1 Tab by mouth three (3) times daily as needed for Anxiety. Max Daily Amount: 1.5 mg.    ATORVASTATIN (LIPITOR) 10 MG TABLET    TAKE 1 TABLET BY MOUTH ONCE DAILY    FUROSEMIDE (LASIX) 40 MG TABLET    TAKE 1 TABLET BY MOUTH ONCE DAILY    IBUPROFEN (MOTRIN) 600 MG TABLET    TAKE 1 TABLET BY MOUTH EVERY 8 HOURS AS NEEDED FOR PAIN    LOSARTAN (COZAAR) 25 MG TABLET    TAKE 1 TABLET BY MOUTH ONCE DAILY    NEOMYCIN-POLYMYXIN-HYDROCORTISONE, BUFFERED, (PEDIOTIC) 3.5-10,000-1 MG/ML-UNIT/ML-% OTIC SUSPENSION    Administer 4 Drops in right ear four (4) times daily. PANTOPRAZOLE (PROTONIX) 40 MG TABLET    TAKE ONE TABLET BY MOUTH ONCE DAILY    POTASSIUM CHLORIDE SR (KLOR-CON 10) 10 MEQ TABLET    TAKE 1 TABLET BY MOUTH ONCE DAILY    SERTRALINE (ZOLOFT) 50 MG TABLET    Take 1 Tab by mouth daily.    These Medications have changed    No medications on file   Stop Taking    No medications on file     JASIEL Servin

## 2019-07-27 NOTE — PROGRESS NOTES
Problem: Falls - Risk of  Goal: *Absence of Falls  Description  Document Trista Angel Fall Risk and appropriate interventions in the flowsheet.   Outcome: Progressing Towards Goal  Note:   Fall Risk Interventions:  Mobility Interventions: Patient to call before getting OOB         Medication Interventions: Patient to call before getting OOB, Teach patient to arise slowly    Elimination Interventions: Call light in reach, Patient to call for help with toileting needs              Problem: Patient Education: Go to Patient Education Activity  Goal: Patient/Family Education  Outcome: Progressing Towards Goal     Problem: Pain  Goal: *Control of Pain  Outcome: Progressing Towards Goal     Problem: Patient Education: Go to Patient Education Activity  Goal: Patient/Family Education  Outcome: Progressing Towards Goal

## 2019-07-27 NOTE — PROGRESS NOTES
Problem: Discharge Planning  Goal: *Discharge to safe environment  Outcome: Resolved/Met   Plan home with hh    Reason for Admission:   abcess                   RRAT Score: 8                    Plan for utilizing home health:   yes                       Current Advanced Directive/Advance Care Plan: yes                          Transition of Care Plan:   Spoke with pt, lives alone. Independent with adls and amb. Has cane and rolater. Her car is parked in ED  Lot, she plans to drive self home, told her to check with md first. If unable to drive will have friend transport. Has 6 steps to enter one level home. pcp Dr watson. Demographics correct. Has adv directive on file, 1st agent named is her spouse who is . 2nd agent nolan is her nephew, 2rd is her friends spouse. She designates  Her friend simi for dcp. Agrees to  if needed. Plan home with [facility] & [address] Provider list has been given to the patient and/or patient representative. Patient and/or patient representative has signed the [place] of Choice selecting _____[facility] home [facility]____________________as their preference agency and a copy given. Both Home Health Provider list and Freedom of Choice have been placed on the chart. Cm will need to refer when orders available. Patient has designated ___friend_____________________ to participate in his/her discharge plan and to receive any needed information. Name: Bessy Bell  Address:  Phone number: [phone]    Care Management Interventions  PCP Verified by CM: Yes  Palliative Care Criteria Met (RRAT>21 & CHF Dx)?: No  Mode of Transport at Discharge:  Other (see comment)  Transition of Care Consult (CM Consult): Discharge Planning, [address]: No  Reason Outside [place]: (pt chose an agency had in past)  Discharge Durable Medical Equipment: No  Physical Therapy Consult: Yes  Occupational Therapy Consult: Yes  Speech Therapy Consult: No  Current Support Network: Lives Alone  Confirm Follow Up Transport: Family  Freedom of Choice Offered: Yes  Discharge Location  Discharge Placement: Home with home health

## 2019-07-27 NOTE — PROGRESS NOTES
Problem: Falls - Risk of  Goal: *Absence of Falls  Description  Document August Guidry Fall Risk and appropriate interventions in the flowsheet.   7/27/2019 1912 by Maira Pandey RN  Outcome: Progressing Towards Goal  Note:   Fall Risk Interventions:  Mobility Interventions: Patient to call before getting OOB         Medication Interventions: Patient to call before getting OOB    Elimination Interventions: Call light in reach           7/27/2019 0930 by Maira Pandey RN  Outcome: Progressing Towards Goal  Note:   Fall Risk Interventions:  Mobility Interventions: Patient to call before getting OOB         Medication Interventions: Patient to call before getting OOB, Teach patient to arise slowly    Elimination Interventions: Call light in reach, Patient to call for help with toileting needs              Problem: Patient Education: Go to Patient Education Activity  Goal: Patient/Family Education  7/27/2019 1912 by Maira Pandey RN  Outcome: Progressing Towards Goal  7/27/2019 0930 by Maira Pandey RN  Outcome: Progressing Towards Goal     Problem: Pain  Goal: *Control of Pain  7/27/2019 1912 by Maira Pandey RN  Outcome: Progressing Towards Goal  7/27/2019 0930 by Maira Pandey RN  Outcome: Progressing Towards Goal     Problem: Patient Education: Go to Patient Education Activity  Goal: Patient/Family Education  7/27/2019 1912 by Maira Pandey RN  Outcome: Progressing Towards Goal  7/27/2019 0930 by Maira Pandey RN  Outcome: Progressing Towards Goal

## 2019-07-27 NOTE — PROGRESS NOTES
Problem: Falls - Risk of  Goal: *Absence of Falls  Description  Document Saqib Du Fall Risk and appropriate interventions in the flowsheet.   Outcome: Progressing Towards Goal  Note:   Fall Risk Interventions:  Mobility Interventions: Patient to call before getting OOB         Medication Interventions: Patient to call before getting OOB, Teach patient to arise slowly    Elimination Interventions: Call light in reach, Patient to call for help with toileting needs              Problem: Patient Education: Go to Patient Education Activity  Goal: Patient/Family Education  Outcome: Progressing Towards Goal     Problem: Pain  Goal: *Control of Pain  Outcome: Progressing Towards Goal     Problem: Patient Education: Go to Patient Education Activity  Goal: Patient/Family Education  Outcome: Progressing Towards Goal

## 2019-07-27 NOTE — PROGRESS NOTES
Bedside shift change report given to Carlitos LEBLANC (oncoming nurse) by Tata Garibay (offgoing nurse). Report included the following information SBAR, Kardex, Intake/Output and MAR.

## 2019-07-27 NOTE — ED NOTES
TRANSFER - ED to INPATIENT REPORT:    SBAR report made available to receiving floor on this patient being transferred to [address] [place] (2200)  for routine progression of care       Admitting diagnosis Abscess of leg [L02.419]    Information from the following report(s) SBAR, ED Summary, MAR and Recent Results was made available to receiving floor. Lines:   Peripheral IV 07/26/19 Right Antecubital (Active)   Site Assessment Clean, dry, & intact 7/26/2019  5:16 PM   Phlebitis Assessment 0 7/26/2019  5:16 PM   Infiltration Assessment 0 7/26/2019  5:16 PM   Dressing Status Clean, dry, & intact 7/26/2019  5:16 PM   Hub Color/Line Status Green 7/26/2019  5:16 PM        Medication list confirmed with patient    Opportunity for questions and clarification was provided. Patient is oriented to time, place, person and situation .   Patient is  continent and ambulatory with assist     Valuables transported with patient     Patient transported with:   Registered Nurse      =Monitored (most recent)  Vitals w/ MEWS Score (last day)     Date/Time MEWS Score Pulse Resp Temp BP Level of Consciousness SpO2    07/26/19 1800              100 %    07/26/19 1644  1  98  16  97.4 °F (36.3 °C)  110/65  Alert  100 %              Septic Patients:     Lactic Acid  Lab Results   Component Value Date    LACPOC 1.0 10/19/2017    (Most recent on top)  Repeat drawn: N/A

## 2019-07-27 NOTE — PROGRESS NOTES
0730 Received report from off going RN Lillie at the bedside inclusive of SBAR, lines drains drips and plan. Assessment shows patient with lymphedema to bilat lower extremities, with hard warm swollen area on back of R calf. Left calf by comparison is soft and malleable. Patient has been maintained NPO for surgical intervention as per orders. 1300 Patient having lunch as meal orders changed to regular diet as patient will have interventional radiology drain knee, no NPO required. Plan is to have procedure at 1400, patient made aware. [address] drawn as well as rainbow as patient is very diffiuclt stick. Lab made aware, tubes and cultures taken to lab. 1930 Report given to oncoming RN at the bedside inclusive of SBAR, lines drains drips and plan.

## 2019-07-27 NOTE — PROGRESS NOTES
Internal Medicine Progress Note    Patient's Name: Clark Cowan  Admit Date: 7/26/2019  Length of Stay: 1      Assessment/Plan     Principal Problem:    Popliteal cyst, right (7/26/2019)    Active Problems: Morbid obesity ([address] (11/26/2014)      Depression (11/26/2014)      HTN (hypertension), benign (4/19/2016)      Dyslipidemia (5/16/2019)    Popliteal cyst  - GS, orthopedics consulted - appreciate  - PRN pain control  - management per ortho  - US w/aspiration done today, 21cc aspirated  - follow fluid analysis, cx    Depression, HTN, HLD  - home meds      - Cont acceptable home medications for chronic conditions   - DVT protocol    I have personally reviewed all pertinent labs and films that have officially resulted over the last 24 hours. I have personally checked for all pending labs that are awaiting final results. HPI     Per Dr. Rupal Walter H&P, Clark Cowan is a 71y.o. year old female who presents with cellulitis and abscess. Patient states she came in earlier today to ED for R leg pain and was seen in ED, US for DVT unrevealing. Was sent home but came back 2/2 pain. CT completed on next visit suggesting abscess. I have requested ED call surgery to follow along with us. Interval History     IV vanc/zosyn started. Ortho consulted. US guided aspiration done 7/27, fluid sent for analysis. There is no evidence of abscess on CT report. Clinically the patient does not appear to have cellulitis. WBC wnl, vital signs normal, no erythema or increased edema. Subjective     Pt s/e @ bedside. No major events overnight. Pt reports R knee pain. Denies CP or SOB. Denies abd pain.     Objective     Visit Vitals  /76 (BP 1 Location: Left arm, BP Patient Position: At rest)   Pulse (!) 46   Temp 97.5 °F (36.4 °C)   Resp 16   Ht 5' (1.524 m)   Wt 109.3 kg (241 lb)   SpO2 98%   BMI 47.07 kg/m²       Physical Exam:  General Appearance: NAD, conversant  HENT: normocephalic/atraumatic, moist mucus membranes  Neck: No JVD, supple  Lungs: CTA with normal respiratory effort  CV: RRR, no m/r/g  Abdomen: soft, non-tender, normal bowel sounds  Extremities: no cyanosis, no erythema, diffuse lymphedema BLE  Neuro: No focal deficits, motor/sensory intact  Skin: Normal color, intact      Intake and Output:  Current Shift:  07/27 0701 - 07/27 1900  In: -   Out: 250 [Urine:250]  Last three shifts:  07/25 1901 - 07/27 0700  In: 50 [I.V.:50]  Out: 250 [Urine:250]    Lab/Data Reviewed: All lab results for the last 24 hours reviewed. Imaging Reviewed:  Xr Tib/fib Rt    Result Date: 7/26/2019  EXAM: Right lower leg, 2 views INDICATION: Right knee pain behind the knee since receiving spinal injection on 7/12. COMPARISON: Right knee series on 12/11/2017 _______________ FINDINGS: Total knee arthroplasty in situ. No fracture or osseous lesion. Obesity. Interval developing calcified phleboliths anterior to the knee and within the proximal lower leg. A few calcified phleboliths are also present in the medial mid to lower leg. _______________     IMPRESSION: Right knee arthroplasty in situ, no osseous abnormality. Obesity. Calcified subcutaneous phleboliths.       Medications Reviewed:  Current Facility-Administered Medications   Medication Dose Route Frequency    vancomycin (VANCOCIN) 1500 mg in  ml infusion ##4 HOUR INFUSION##  1,500 mg IntraVENous Q18H    piperacillin-tazobactam (ZOSYN) 4.5 g in 0.9% sodium chloride (MBP/ADV) 100 mL MBP  4.5 g IntraVENous Q8H    melatonin tablet 3 mg  3 mg Oral QHS PRN    [START ON 7/29/2019] VANCOMYCIN INFORMATION NOTE   Other ONCE    clindamycin (CLEOCIN) 600mg D5W 50mL IVPB (premix)  600 mg IntraVENous Q8H    VANCOMYCIN INFORMATION NOTE   Other Rx Dosing/Monitoring         Fabián Santiago PA-C  [address] [place] [address]  Hospitalist Division  Office:  [phone]  Pager: [phone]

## 2019-07-27 NOTE — PROCEDURES
Radiology Brief Procedure Note    Interventional Radiologist: Shama Araiza MD    Pre-operative Diagnosis:  Right knee joint effusion    Post-operative Diagnosis: Same as pre-op dx    Procedure(s) Performed:  Fluroscopically guided right knee joint aspiration    Anesthesia:  Local anesthetic    Findings:  Lateral subpatellar approach chosen for access of joint effusion. Needle and contrast confirm intra-articular position    Complications: None    Estimated Blood Loss:  minimal    Tubes and Drains: None    Specimens: 21mL of turbid joint fluid aspirated    Condition: Good    Disposition:  Return to patient care division    Plan: Follow up labs as ordered by ortho consult service - Turbid nature of fluid called to JASIEL Tee caring for the patient.       Shama Araiza MD  [address] [place] [address] [facility]    7/27/2019

## 2019-07-27 NOTE — PROGRESS NOTES
Pharmacy Dosing Services: Vancomycin    Indication: Skin and Soft Tissue Infection    Day of therapy: 0    Other Antimicrobials (Include dose, start day & day of therapy):  Piperacillin-Tazobactam 4.5 grams every 8 hours MESSI    Loading dose (date given): 2,000 mg  Current Maintenance dose: New start    Goal Vancomycin Level: 15-20 mcg/mL  (Trough 15-20 for most infections, 20 for meningitis/osteomyelitis, pre-HD level ~25)    Vancomycin Level (if drawn): New start     Significant Cultures: New start    Renal function stable? (unstable defined as SCr increase of 0.5 mg/dL or > 50% increase from baseline, whichever is greater) (Y/N): Y     CAPD, Hemodialysis or Renal Replacement Therapy (Y/N): N     Recent Labs     19  1735   CREA 0.82   BUN 10   WBC 6.9     Temp (24hrs), Av.3 °F (36.3 °C), Min:97.1 °F (36.2 °C), Max:97.4 °F (36.3 °C)    Creatinine Clearance (Creatinine Clearance (ml/min)): Estimated Creatinine Clearance: 72.6 mL/min (based on SCr of 0.82 mg/dL). Regimen assessment: New start  Maintenance dose: 1,500 mg every 18 hours  Next scheduled level: Trough  at [facility] will follow daily and adjust medications as appropriate for renal function and/or serum levels.     Thank you,  Edilberto Gaxiola, PHARMD

## 2019-07-27 NOTE — H&P
Medicine History and Physical    Patient: Teto Pina   Age:  71 y.o. Assessment   Principal Problem:    Abscess of leg (7/26/2019)    Active Problems: Morbid obesity ([address] (11/26/2014)      Depression (11/26/2014)      HTN (hypertension), benign (4/19/2016)      Dyslipidemia (5/16/2019)      Cellulitis (7/26/2019)          Plan     1)  Abscess and cellulitis   - vanc and zosyn   - surgery consult   - NPO midnight   - percocet    2)  Home meds for HTN/HLD/depression    DISPO    Anticipated Date of Discharge: 2-3 days  Anticipated Disposition (home, SNF) : home/snf    Chief Complaint:   Chief Complaint   Patient presents with    Leg Pain         HPI:   Teto Pina is a 71y.o. year old female who presents with cellulitis and abscess. Patient states she came in earlier today to ED for R leg pain and was seen in ED, US for DVT unrevealing. Was sent home but came back 2/2 pain. CT completed on next visit suggesting abscess. I have requested ED call surgery to follow along with us. Review of Systems - positive responses in bold type   Constitutional: Negative for fever, chills, diaphoresis and unexpected weight change. HENT: Negative for ear pain, congestion, sore throat, rhinorrhea, drooling, trouble swallowing, neck pain and tinnitus. Eyes: Negative for photophobia, pain, redness and visual disturbance. Respiratory: negative for shortness of breath, cough, choking, chest tightness, wheezing or stridor. Cardiovascular: Negative for chest pain, palpitations and leg swelling. Gastrointestinal: Negative for nausea, vomiting, abdominal pain, diarrhea, constipation, blood in stool, abdominal distention and anal bleeding. Genitourinary: Negative for dysuria, urgency, frequency, hematuria, flank pain and difficulty urinating. Musculoskeletal: Negative for back pain and arthralgias. Skin: Negative for color change, rash and wound.    Neurological: Negative for dizziness, seizures, syncope, speech difficulty, light-headedness or headaches. Hematological: Does not bruise/bleed easily. Psychiatric/Behavioral: Negative for suicidal ideas, hallucinations, behavioral problems, self-injury or agitation       Past Medical History:  Past Medical History:   Diagnosis Date    Degenerative disk disease     Depression     Depression     Diffuse idiopathic skeletal hyperostosis     Fracture of leg     Gallstone     Lymphedema of leg 1975    bilateral lower legs    Morbid obesity ([address]     [facility] arterial hypertension [facility]     MANPREET (stress urinary incontinence, female)        Past Surgical History:  Past Surgical History:   Procedure Laterality Date    HX HYSTERECTOMY      HX KNEE REPLACEMENT      HX ORTHOPAEDIC      right shoulder and right knee replaced x 2, left knee replaced as well       Family History:  Family History   Problem Relation Age of Onset    Heart Disease Father     Diabetes Father        Social History:  Social History     Socioeconomic History    Marital status:      Spouse name: Not on file    Number of children: Not on file    Years of education: Not on file    Highest education level: Not on file   Tobacco Use    Smoking status: Never Smoker    Smokeless tobacco: Never Used   Substance and Sexual Activity    Alcohol use: No     Alcohol/week: 0.0 standard drinks    Drug use: No       Home Medications:  Prior to Admission medications    Medication Sig Start Date End Date Taking? Authorizing Provider   sertraline (ZOLOFT) 50 mg tablet Take 1 Tab by mouth daily. 7/12/19   Gloria Oliva MD   neomycin-polymyxin-hydrocortisone, buffered, (PEDIOTIC) 3.5-10,000-1 mg/mL-unit/mL-% otic suspension Administer 4 Drops in right ear four (4) times daily. 5/28/19   Amber Wallace MD   alendronate (FOSAMAX) 70 mg tablet Take 1 Tab by mouth every seven (7) days.  5/16/19   Amber Wallace MD   ALPRAZolam Simpson Paget) 0.5 mg tablet Take 1 Tab by mouth three (3) times daily as needed for Anxiety. Max Daily Amount: 1.5 mg. 5/16/19   Edwige Wallace MD   furosemide (LASIX) 40 mg tablet TAKE 1 TABLET BY MOUTH ONCE DAILY 4/19/19   Joshua Husain MD   losartan (COZAAR) 25 mg tablet TAKE 1 TABLET BY MOUTH ONCE DAILY 4/4/19   Joshua Husain MD   potassium chloride SR (KLOR-CON 10) 10 mEq tablet TAKE 1 TABLET BY MOUTH ONCE DAILY 4/4/19   Joshua Husain MD   ibuprofen (MOTRIN) 600 mg tablet TAKE 1 TABLET BY MOUTH EVERY 8 HOURS AS NEEDED FOR PAIN 4/1/19   Joshua Husain MD   atorvastatin (LIPITOR) 10 mg tablet TAKE 1 TABLET BY MOUTH ONCE DAILY 1/29/19   Joshua Husain MD   pantoprazole (PROTONIX) 40 mg tablet TAKE ONE TABLET BY MOUTH ONCE DAILY 12/30/18   Joshua Husain MD       Allergies: Allergies   Allergen Reactions    Adhesive Other (comments)     Skin burns - red spots    Lisinopril Other (comments)    Nitrofurantoin Other (comments)           Physical Exam:     Visit Vitals  /65 (BP 1 Location: Right arm, BP Patient Position: At rest)   Pulse 98   Temp 97.4 °F (36.3 °C)   Resp 16   Ht 5' (1.524 m)   Wt 109.3 kg (241 lb)   SpO2 100%   BMI 47.07 kg/m²       Physical Exam:  General appearance: alert, cooperative, no distress, appears stated age  Head: Normocephalic, without obvious abnormality, atraumatic  Neck: supple, trachea midline  Lungs: clear to auscultation bilaterally  Heart: regular rate and rhythm, S1, S2 normal, no murmur, click, rub or gallop  Abdomen: soft, non-tender. Bowel sounds normal. No masses,  no organomegaly  Extremities: b/l lymphedema, mild erythema left leg but is fairly unimpressive. Skin: Skin color, texture, turgor normal. No rashes or lesions    Intake and Output:  Current Shift:  No intake/output data recorded. Last three shifts:  No intake/output data recorded.     Lab/Data Reviewed:  CMP:   Lab Results   Component Value Date/Time     07/26/2019 05:35 PM    K 3.8 07/26/2019 05:35 PM  07/26/2019 05:35 PM    CO2 27 07/26/2019 05:35 PM    AGAP 7 07/26/2019 05:35 PM     (H) 07/26/2019 05:35 PM    BUN 10 07/26/2019 05:35 PM    CREA 0.82 07/26/2019 05:35 PM    GFRAA >60 07/26/2019 05:35 PM    GFRNA >60 07/26/2019 05:35 PM    CA 8.8 07/26/2019 05:35 PM     CBC:   Lab Results   Component Value Date/Time    WBC 6.9 07/26/2019 05:35 PM    HGB 10.0 (L) 07/26/2019 05:35 PM    HCT 31.8 (L) 07/26/2019 05:35 PM     07/26/2019 05:35 PM     All Cardiac Markers in the last 24 hours: No results found for: CPK, CK, CKMMB, CKMB, RCK3, CKMBT, CKNDX, CKND1, SHELBY, TROPT, TROIQ, STEPHANIE, TROPT, TNIPOC, BNP, BNPP    China Luu MD    July 26, 2019

## 2019-07-28 VITALS
WEIGHT: 241 LBS | DIASTOLIC BLOOD PRESSURE: 58 MMHG | HEART RATE: 56 BPM | OXYGEN SATURATION: 99 % | SYSTOLIC BLOOD PRESSURE: 104 MMHG | BODY MASS INDEX: 47.32 KG/M2 | RESPIRATION RATE: 18 BRPM | TEMPERATURE: 98.3 F | HEIGHT: 60 IN

## 2019-07-28 PROBLEM — M00.9 SEPTIC JOINT OF RIGHT KNEE JOINT (HCC): Status: ACTIVE | Noted: 2019-07-28

## 2019-07-28 LAB
ANION GAP SERPL CALC-SCNC: 5 MMOL/L (ref 3–18)
ATRIAL RATE: 45 BPM
ATRIAL RATE: 49 BPM
BODY FLD TYPE: NORMAL
BUN SERPL-MCNC: 8 MG/DL (ref 7–18)
BUN/CREAT SERPL: 13 (ref 12–20)
CALCIUM SERPL-MCNC: 8.3 MG/DL (ref 8.5–10.1)
CALCULATED P AXIS, ECG09: 119 DEGREES
CALCULATED P AXIS, ECG09: 50 DEGREES
CALCULATED R AXIS, ECG10: -7 DEGREES
CALCULATED R AXIS, ECG10: 3 DEGREES
CALCULATED T AXIS, ECG11: 15 DEGREES
CALCULATED T AXIS, ECG11: 23 DEGREES
CHLORIDE SERPL-SCNC: 110 MMOL/L (ref 100–111)
CO2 SERPL-SCNC: 29 MMOL/L (ref 21–32)
CREAT SERPL-MCNC: 0.64 MG/DL (ref 0.6–1.3)
CRYSTALS FLD MICRO: NEGATIVE
DIAGNOSIS, 93000: NORMAL
DIAGNOSIS, 93000: NORMAL
ERYTHROCYTE [DISTWIDTH] IN BLOOD BY AUTOMATED COUNT: 15 % (ref 11.6–14.5)
GLUCOSE SERPL-MCNC: 98 MG/DL (ref 74–99)
HCT VFR BLD AUTO: 27.6 % (ref 35–45)
HGB BLD-MCNC: 8.5 G/DL (ref 12–16)
MCH RBC QN AUTO: 26 PG (ref 24–34)
MCHC RBC AUTO-ENTMCNC: 30.8 G/DL (ref 31–37)
MCV RBC AUTO: 84.4 FL (ref 74–97)
P-R INTERVAL, ECG05: 154 MS
P-R INTERVAL, ECG05: 178 MS
PLATELET # BLD AUTO: 280 K/UL (ref 135–420)
PMV BLD AUTO: 9.1 FL (ref 9.2–11.8)
POTASSIUM SERPL-SCNC: 3.8 MMOL/L (ref 3.5–5.5)
Q-T INTERVAL, ECG07: 496 MS
Q-T INTERVAL, ECG07: 506 MS
QRS DURATION, ECG06: 122 MS
QRS DURATION, ECG06: 132 MS
QTC CALCULATION (BEZET), ECG08: 437 MS
QTC CALCULATION (BEZET), ECG08: 448 MS
RBC # BLD AUTO: 3.27 M/UL (ref 4.2–5.3)
SODIUM SERPL-SCNC: 144 MMOL/L (ref 136–145)
VENTRICULAR RATE, ECG03: 45 BPM
VENTRICULAR RATE, ECG03: 49 BPM
WBC # BLD AUTO: 4.8 K/UL (ref 4.6–13.2)

## 2019-07-28 PROCEDURE — 80048 BASIC METABOLIC PNL TOTAL CA: CPT

## 2019-07-28 PROCEDURE — 74011000258 HC RX REV CODE- 258: Performed by: HOSPITALIST

## 2019-07-28 PROCEDURE — 85027 COMPLETE CBC AUTOMATED: CPT

## 2019-07-28 PROCEDURE — 36415 COLL VENOUS BLD VENIPUNCTURE: CPT

## 2019-07-28 PROCEDURE — 74011250637 HC RX REV CODE- 250/637: Performed by: HOSPITALIST

## 2019-07-28 PROCEDURE — 74011250636 HC RX REV CODE- 250/636: Performed by: HOSPITALIST

## 2019-07-28 RX ADMIN — OXYCODONE HYDROCHLORIDE AND ACETAMINOPHEN 1 TABLET: 7.5; 325 TABLET ORAL at 00:58

## 2019-07-28 RX ADMIN — PANTOPRAZOLE SODIUM 40 MG: 40 TABLET, DELAYED RELEASE ORAL at 09:00

## 2019-07-28 RX ADMIN — PIPERACILLIN, TAZOBACTAM 4.5 G: 4; .5 INJECTION, POWDER, LYOPHILIZED, FOR SOLUTION INTRAVENOUS at 02:14

## 2019-07-28 RX ADMIN — SERTRALINE HYDROCHLORIDE 50 MG: 50 TABLET ORAL at 09:00

## 2019-07-28 NOTE — ROUTINE PROCESS
Bedside and Verbal shift change report given to Kendell Leach ([place]), RN (oncoming nurse) by Evelina Bender RN, BSN   (offgoing nurse). Report included the following information SBAR, Kardex, Procedure Summary, Intake/Output, MAR and Recent Results.      Evelina Bender RN, BSN

## 2019-07-28 NOTE — PROGRESS NOTES
Notified by Dr Reema Vega pt going to be transferred to [facility]. Accepting md Dr Amauri Munoz ortho surgeon. Called  access center, they  States md arranged through [facility] that our access center has nothing to do with it. They will not set up transport. Called [facility] transfer center, spoke with cj, they do not have bed yet will notify me when bed available. Received call back from cj at  [facility] transfer center pt has bed in room 104 report to be called to [phone]. Nurse trini reed, reminded of need for emtala. [facility] , spoke with juanjo. Pcs fax'd, placed on envelope in nurses station. Copy of dc summary, chart, adv directive placed in envelope. Transport set for 11 am. Notified nurse and pt. Care Management Interventions  PCP Verified by CM:  Yes  Palliative Care Criteria Met (RRAT>21 & CHF Dx)?: No  Mode of Transport at Discharge: [facility]  Transition of Care Consult (CM Consult): Discharge [place] [phone] [id number]: No  Reason [facility]: (pt chose an agency had in past)  Discharge Durable Medical Equipment: No  Physical Therapy Consult: Yes  Occupational Therapy Consult: Yes  Speech Therapy Consult: No  Current Support Network: Lives Alone  Confirm Follow Up Transport: Other (see comment)  Freedom of Choice Offered: Yes  Discharge Location  Discharge Placement: Other: [facility] room 104

## 2019-07-28 NOTE — PROGRESS NOTES
conducted an initial consultation and Spiritual Assessment for Pearl Soto, who is a 71 y.o.,female. Patients Primary Language is: [place]. According to the patients EMR Evangelical Affiliation is: Yazdanism. The reason the Patient came to the hospital is:   Patient Active Problem List    Diagnosis Date Noted    Septic joint of right knee joint ([facility] 07/28/2019    Popliteal cyst, right 07/26/2019    Cellulitis 07/26/2019    Dyslipidemia 05/16/2019    Herniated nucleus pulposus, lumbar 05/16/2019    Lymphedema of both lower extremities 08/27/2018    Osteoporosis 05/01/2017    Gastroesophageal reflux disease without esophagitis 10/26/2016    Advance care planning 10/03/2016    HTN (hypertension), benign 04/19/2016    Stress incontinence of urine 11/24/2015    Cataract 11/19/2015    Morbid obesity ([facility] 11/26/2014    Depression 11/26/2014    Impaired hearing 11/26/2014        The  provided the following Interventions:  Initiated a relationship of care and support with patient in room 2207 this morning. Listened empathically to her story of being here many times and how she is now being transferred to [facility] where she will have more knee surgery done as she has an infection in the knee she has already had fixed. Patient talked about her spiritual beliefs and hopes in God's great mercyfull healing for her once again. Patient also talked about the times when we had been together here in this hospital on  previous visits. Pre-op prayer offered for her  Provided information about Spiritual Care Services. Offered prayer and assurance of continued prayers on patients behalf. The following outcomes were achieved:  Patient shared limited information about her medical narrative and spiritual journey/beliefs. Patient processed feeling about current hospitalization. Patient expressed gratitude for pastoral care visit.     Assessment:  Patient does not have any Hinduism/cultural needs that will affect patients preferences in health care. There are no further spiritual or Hinduism issues which require Spiritual Care Services interventions at this time. Plan:  Chaplains will continue to follow and will provide pastoral care on an as needed/requested basis    . Artis Diamond   Spiritual Care   [phone]

## 2019-07-28 NOTE — PROGRESS NOTES
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 Recommendation is for transfer to [facility] and consultation with Dr Brittanie Leach or partner for further management as he is her treating surgeon.

## 2019-07-28 NOTE — DISCHARGE SUMMARY
2 [facility]  Hospitalist Division    Discharge Summary    Patient: Halley Delgado MRN: [id number]  CSN: [id number]    YOB: 1950  Age: 71 y.o.   Sex: female    DOA: 7/26/2019 LOS:  LOS: 2 days   Discharge Date:      Admission Diagnoses: Abscess of leg [L02.419]    Discharge Diagnoses:    Problem List as of 7/28/2019 Date Reviewed: 7/12/2019          Codes Class Noted - Resolved    * (Principal) Septic joint of right knee joint ([address] ICD-10-CM: M00.9  ICD-9-CM: 711.06  7/28/2019 - Present        Popliteal cyst, right ICD-10-CM: M71.21  ICD-9-CM: 727.51  7/26/2019 - Present        Cellulitis ICD-10-CM: L03.90  ICD-9-CM: 682.9  7/26/2019 - Present        Dyslipidemia ICD-10-CM: E78.5  ICD-9-CM: 272.4  5/16/2019 - Present        Herniated nucleus pulposus, lumbar ICD-10-CM: M51.26  ICD-9-CM: 722.10  5/16/2019 - Present        Lymphedema of both lower extremities ICD-10-CM: I89.0  ICD-9-CM: 457.1  8/27/2018 - Present        Osteoporosis ICD-10-CM: M81.0  ICD-9-CM: 733.00  5/1/2017 - Present        Gastroesophageal reflux disease without esophagitis ICD-10-CM: K21.9  ICD-9-CM: 530.81  10/26/2016 - Present        Advance care planning ICD-10-CM: Z71.89  ICD-9-CM: V65.49  10/3/2016 - Present    Overview Signed 10/3/2016  7:13 PM by Macie Daniels MD     Advance Care Planning (ACP) Initial Conversation     Date of ACP Conversation: 07/26/2016  Persons included in Conversation: patient and family  Length of ACP Conversation in minutes: 20 minutes     Authorized Decision Maker (if patient is incapable of making informed decisions):    This person is:   Healthcare Agent/Medical Power of  under Advance Directive      General ACP for ALL Patients with Decision Making Capacity:   Importance of advance care planning, including choosing a healthcare agent to communicate patient's healthcare decisions if patient lost the ability to make decisions, such as after a sudden illness or accident     Chronic or serious illness:  Understanding of medical condition      Conversation Outcomes / Follow-Up Plan:   Recommended completion of Advance Directive form after review of materials and conversation with prospective healthcare agent             Electronically signed by Elizabeth Up MD at 07/26/16 [id number]                  HTN (hypertension), benign ICD-10-CM: I10  ICD-9-CM: 401.1  4/19/2016 - Present        Stress incontinence of urine ICD-10-CM: N39.3  ICD-9-CM: Jaquita Cocking  11/24/2015 - Present        Cataract ICD-10-CM: H26.9  ICD-9-CM: 366.9  11/19/2015 - Present        Morbid obesity ([address] ICD-10-CM: E66.01  ICD-9-CM: 278.01  11/26/2014 - Present        Depression ICD-10-CM: F32.9  ICD-9-CM: 305  11/26/2014 - Present        Impaired hearing ICD-10-CM: H91.90  ICD-9-CM: 389.9  11/26/2014 - Present        RESOLVED: UTI (urinary tract infection) ICD-10-CM: N39.0  ICD-9-CM: 599.0  10/19/2017 - 5/16/2019              Discharge Condition: Stable    Discharge To: [facility]    Consults: Orthopedics    HPI: Per Dr. Angelina De Los Santos H&P, Nadia Ku is a 71y.o. year old female who presents with cellulitis and abscess.  Patient states she came in earlier today to ED for R leg pain and was seen in ED, US for DVT unrevealing.  Was sent home but came back 2/2 pain.  CT completed on next visit suggesting abscess.  I have requested ED call surgery to follow along with us. [facility] Course: Patient has a prior history of R TKA 11/2007, and subsequent infected joint with irrigation and debridement done by Dr. Emily Chang on 3/2008. IV vanc/zosyn started. PVL described likely Baker's cyst. CT with R joint effusion, periarticular/intramuscular fluid collections. Ortho consulted. US guided aspiration done 7/27. Synovial fluid analysis suggesting septic joint with 82.6K nucleated cells, 97% neutrophils. Ortho [facility].  VS and labs stable for transfer to [facility]. Physical Exam:  General appearance: alert, cooperative, no distress, appears stated age  Head: Normocephalic, without obvious abnormality, atraumatic  Lungs: clear to auscultation bilaterally  Heart: regular rate and rhythm, S1, S2 normal, no murmur, click, rub or gallop  Extremities: no cyanosis or BLE lymphedema  Skin: Skin color, texture normal. No rashes or lesions  Neurologic: no focal deficits, motor/sensory intact  PSY: Mood and affect normal, appropriately behaved    Significant Diagnostic Studies:     BMP:   Lab Results   Component Value Date/Time     07/28/2019 04:15 AM    K 3.8 07/28/2019 04:15 AM     07/28/2019 04:15 AM    CO2 29 07/28/2019 04:15 AM    AGAP 5 07/28/2019 04:15 AM    GLU 98 07/28/2019 04:15 AM    BUN 8 07/28/2019 04:15 AM    CREA 0.64 07/28/2019 04:15 AM    GFRAA >60 07/28/2019 04:15 AM    GFRNA >60 07/28/2019 04:15 AM     CBC:   Lab Results   Component Value Date/Time    WBC 4.8 07/28/2019 04:15 AM    HGB 8.5 (L) 07/28/2019 04:15 AM    HCT 27.6 (L) 07/28/2019 04:15 AM     07/28/2019 04:15 AM       Xr Tib/fib Rt    Result Date: 7/26/2019  EXAM: Right lower leg, 2 views INDICATION: Right knee pain behind the knee since receiving spinal injection on 7/12. COMPARISON: Right knee series on 12/11/2017 _______________ FINDINGS: Total knee arthroplasty in situ. No fracture or osseous lesion. Obesity. Interval developing calcified phleboliths anterior to the knee and within the proximal lower leg. A few calcified phleboliths are also present in the medial mid to lower leg. _______________     IMPRESSION: Right knee arthroplasty in situ, no osseous abnormality. Obesity. Calcified subcutaneous phleboliths. Cta Abd Art W Runoff W Wo Cont    Addendum Date: 7/27/2019    Addendum: Case discussed with Regina Davis, orthopedics physicians assistant caring for the patient. At 12:05 PM on 7/27/2019.  Given available report of PVL study describing a likely Baker's involving the right knee, findings on the present CT exam may reflect sequela of a large multilocular Baker's cyst. Characterization of this finding by CT is suboptimal given artifact, and a dedicated ultrasound examination of the popliteal region may be helpful for further resolution . Result Date: 7/27/2019  PROCEDURE: CT angiogram of the abdomen, pelvis and bilateral lower extremities. CLINICAL HISTORY: 80-year-old patient with right leg pain, greatest behind the knee COMPARISON: No prior CTA examination, prior CT of the abdomen and pelvis 10/6/2007 TECHNIQUE: Contrast-enhanced CT angiogram of the abdomen, pelvis and both lower extremities was performed after the uneventful administration of 123 mL of Optiray 350. Axial images acquired from the domes of the diaphragms through both feet. Coronal and sagittal reformatted images were generated from the axial data. MIP and curved reformatted images were generated on a separate workstation. One or more dose reduction techniques were used on this CT: automated exposure control, adjustment of the mAs and/or kVp according to patient size, and iterative reconstruction techniques. The specific techniques used on this CT exam have been documented in the patient's electronic medical record. Digital Imaging and Communications in Medicine (DICOM) format image data are available to nonaffiliated external healthcare facilities or entities on a secure, media free, reciprocally searchable basis with patient authorization for at least a 12-month period after this study. --- CTA FINDINGS --- The visualized descending thoracic aorta is unremarkable. The abdominal aorta demonstrates mild atherosclerotic calcification, it tapers without aneurysm or dissection. The celiac axis demonstrates normal anatomy, minimalcalcified plaque near its origin, no significant stenosis. The superior mesenteric artery is widely patent. The inferior mesenteric arteries also widely patent.  Single patent renal arteries are present. The common iliac artery bifurcation appears normal. The common, and external iliacs as both common femoral arteries, profunda femoris and proximal superficial femoral arteries are patent. Assessment of the popliteal arteries is limited secondary to streak artifact from the arthroplasties. Despite this limitation, both arteries appear patent. On the right, there is a three-vessel runoff to the level of the ankle. On the left, the tibioperoneal trunk appears patent. A two-vessel runoff is noted to the ankle, with the posterior tibial artery not well opacified. --- CT FINDINGS --- Chest: Lung bases are clear Visualized heart and pericardium appear unremarkable. Note is made of coronary artery calcifications. The bowel is unremarkable. No bowel wall  thickening or distention. No focal hepatic, pancreatic, splenic or adrenal abnormality. The gall bladder appears normal.  The kidneys are unremarkable. Urinary bladder normal in CT appearance. No free air or fluid. No acute bony abnormality, mild spondylosis noted throughout the lower thoracic and lumbar spine. Evidence of prior mid femoral osteotomy/Glidewire fixation. There are bilateral indwelling knee arthroplasties. On the right, there is a moderate size joint effusion with somewhat ill-defined periarticular fluid collection which is noted to dissect into the lateral and medial heads of the gastrocnemius as well as into the substance of the soleus muscle belly. Notably, these findings are quite limited in characterization secondary to extensive artifact. There is considerable skin thickening and subcutaneous edema noted throughout both extremities, right more pronounced than left. No findings of periprosthetic fracture demonstrated. No soft tissue gas demonstrated. Numerous dermal calcifications noted. IMPRESSION: 1. No evidence of active contrast extravasation from the lower extremity arteries. No evidence of pseudoaneurysm formation.  Patent bilateral popliteal arteries. 2. Patent three-vessel runoff to the level of the right ankle, with a two-vessel runoff to the level of the left ankle with the posterior tibial artery not opacified beyond the level of the tibioperoneal trunk. 3. Postsurgical changes involving each femoral shaft along with bilateral indwelling knee arthroplasties. > On the right, there is a moderate-sized joint effusion with periarticular/intramuscular fluid collections which are difficult to characterize given marked artifact. Pertinently, no evidence of soft tissue emphysema is present.   > On the left, no evidence of joint effusion or periarticular fluid collection is present. 4. Extensive skin thickening and subcutaneous edema throughout the lower extremities bilaterally, potentially reflecting sequela of lymphedema, with or without associated cellulitis. Note: Preliminary report sent to the Emergency Department by the radiology resident at the time of the study. Xr Fluoro Guide Asp/bx/inj/loc    Result Date: 7/27/2019  RIGHT KNEE ASPIRATION COMPARISON: Correlation is made with images from CTA with runoff 7/26/2019 CLINICAL INFORMATION: 68-year-old patient with moderate size right knee joint effusion, prior history of right knee joint septic arthritis. Periarticular fluid collection noted on preceding CT PROCEDURE: GUIDANCE: Fluoroscopic guidance was used to position (and confirm the position of) the needle. Image(s) saved in PACS: Fluoroscopic Fluoroscopic time: 0.2 minutes Fluoroscopic dose (reference air kerma): 1.42 mGy After explaining the procedure to the patient, including the potential risks, benefits, and alternatives, informed written and verbal consent was obtained. The skin overlying the patient's knee was prepared and draped in the usual sterile fashion. One percent lidocaine was infiltrated into the subcutaneous tissues of the right knee to achieve local anesthesia.   Under fluoroscopic guidance, a 20 gauge spinal needle was advanced into the joint, using a lateral subpatellar approach. [Approximately 21 mL of turbid joint fluid was aspirated.]  A small amount of nonionic contrast was gently hand injected into the joint to confirm intra-articular position. The needle was then removed. The patient tolerated procedure well, with no initial complications. IMPRESSION: 1. Status post uncomplicated right knee aspiration. 2. Approximately 21 cc of turbid fluid obtained. Fluid submitted to the lab as ordered for further evaluation.]        Discharge Medications:       Current Discharge Medication List      CONTINUE these medications which have NOT CHANGED    Details   sertraline (ZOLOFT) 50 mg tablet Take 1 Tab by mouth daily. Qty: 90 Tab, Refills: 3    Associated Diagnoses: Acute depression      neomycin-polymyxin-hydrocortisone, buffered, (PEDIOTIC) 3.5-10,000-1 mg/mL-unit/mL-% otic suspension Administer 4 Drops in right ear four (4) times daily. Qty: 10 mL, Refills: 0    Associated Diagnoses: Acute diffuse otitis externa of right ear      alendronate (FOSAMAX) 70 mg tablet Take 1 Tab by mouth every seven (7) days. Qty: 12 Tab, Refills: 1    Associated Diagnoses: Age-related osteoporosis without current pathological fracture      ALPRAZolam (XANAX) 0.5 mg tablet Take 1 Tab by mouth three (3) times daily as needed for Anxiety.  Max Daily Amount: 1.5 mg.  Qty: 30 Tab, Refills: 0    Associated Diagnoses: Anxiety      furosemide (LASIX) 40 mg tablet TAKE 1 TABLET BY MOUTH ONCE DAILY  Qty: 30 Tab, Refills: 2    Comments: Please consider 90 day supplies to promote better adherence  Associated Diagnoses: Leg swelling      losartan (COZAAR) 25 mg tablet TAKE 1 TABLET BY MOUTH ONCE DAILY  Qty: 90 Tab, Refills: 1    Associated Diagnoses: Essential hypertension      potassium chloride SR (KLOR-CON 10) 10 mEq tablet TAKE 1 TABLET BY MOUTH ONCE DAILY  Qty: 90 Tab, Refills: 1    Associated Diagnoses: Leg swelling      ibuprofen (MOTRIN) 600 mg tablet TAKE 1 TABLET BY MOUTH EVERY 8 HOURS AS NEEDED FOR PAIN  Qty: 30 Tab, Refills: 2    Comments: Please consider 90 day supplies to promote better adherence  Associated Diagnoses: Trochanteric bursitis of right hip      atorvastatin (LIPITOR) 10 mg tablet TAKE 1 TABLET BY MOUTH ONCE DAILY  Qty: 90 Tab, Refills: 0    Associated Diagnoses: Hypercholesteremia      pantoprazole (PROTONIX) 40 mg tablet TAKE ONE TABLET BY MOUTH ONCE DAILY  Qty: 90 Tab, Refills: 3    Associated Diagnoses: Gastroesophageal reflux disease without esophagitis               Activity: Per ortho    Diet: Cardiology Diet    Wound Care: None needed    Follow-up: Orthopedic surgery on arrival    Discharge time: >35 minutes    [facility]  Office:  [phone]  Pager: [phone]      7/28/2019, 8:12 AM

## 2019-07-28 NOTE — PROGRESS NOTES
9705 report called to THE [facility], given to Kris Molina at [phone], pt disposition is room 104,  time 11 am today,,, pfreeman rn

## 2019-07-28 NOTE — PROGRESS NOTES
1130 medical transport on sight to facilitate xfer to [facility] called receiving nurse Betty to inform of  and pending arrival,, pfreeman rn

## 2019-07-28 NOTE — PROGRESS NOTES
Problem: Falls - Risk of  Goal: *Absence of Falls  Description  Document Shaylee Zuri Fall Risk and appropriate interventions in the flowsheet.   Outcome: Progressing Towards Goal  Note:   Fall Risk Interventions:  Mobility Interventions: Patient to call before getting OOB         Medication Interventions: Patient to call before getting OOB, Teach patient to arise slowly    Elimination Interventions: Patient to call for help with toileting needs, Call light in reach              Problem: Patient Education: Go to Patient Education Activity  Goal: Patient/Family Education  Outcome: Progressing Towards Goal     Problem: Pain  Goal: *Control of Pain  Outcome: Progressing Towards Goal     Problem: Patient Education: Go to Patient Education Activity  Goal: Patient/Family Education  Outcome: Progressing Towards Goal

## 2019-07-31 ENCOUNTER — PATIENT OUTREACH (OUTPATIENT)
Dept: FAMILY MEDICINE CLINIC | Age: 69
End: 2019-07-31

## 2019-07-31 NOTE — PROGRESS NOTES
NN received notification of Patient discharge on 7/31/19. Noted Patient was transferred from [facility] to [facility] on 7/28/19. Patient is currently admitted to [facility].      Will follow upon discharge from the hospital.

## 2019-08-01 LAB
BACTERIA SPEC CULT: NORMAL
SERVICE CMNT-IMP: NORMAL

## 2019-08-02 LAB
BACTERIA SPEC CULT: NORMAL
GRAM STN SPEC: NORMAL
GRAM STN SPEC: NORMAL
SERVICE CMNT-IMP: NORMAL

## 2019-08-05 ENCOUNTER — PATIENT OUTREACH (OUTPATIENT)
Dept: FAMILY MEDICINE CLINIC | Age: 69
End: 2019-08-05

## 2019-08-05 NOTE — PROGRESS NOTES
NN received notification of Patient discharge today 8/5/19. While NN attempting to call SNF for follow up. Noted Patient is back to a hospital.     Will follow up upon discharge from Hospital or SNF.

## 2019-08-08 ENCOUNTER — PATIENT OUTREACH (OUTPATIENT)
Dept: FAMILY MEDICINE CLINIC | Age: 69
End: 2019-08-08

## 2019-08-08 NOTE — PROGRESS NOTES
Follow up call to [facility], [facility]. for update on patient. Leonardo talavera the  told NN admission 8/2/19 and request was sent to [employer] to extend through 8/12. They are still waiting on approval.  PT continues to work with patient. She lives alone and does not feel able to go home by herself. PT does recommend day time assist.  As of 8/22 patient's copay starts and patient does not want to pay. [place] with [facility] [place] do not see request for extension. Patient also wants to apply for Medicaid but feels she makes too much money. Leonardo talavera does not see [employer] on file, NN asked her to have  come in to do [employer]. Will continue to follow.

## 2019-08-15 ENCOUNTER — PATIENT OUTREACH (OUTPATIENT)
Dept: FAMILY MEDICINE CLINIC | Age: 69
End: 2019-08-15

## 2019-08-15 NOTE — PROGRESS NOTES
Juana Abad from [facility], [facility]. called NN back to update her about care needs. She said patient was able to perform all ADLs and was also able to cook for self. NN will continue to follow patient.

## 2019-08-19 ENCOUNTER — DOCUMENTATION ONLY (OUTPATIENT)
Dept: FAMILY MEDICINE CLINIC | Age: 69
End: 2019-08-19

## 2019-08-19 NOTE — PROGRESS NOTES
Patient did not arrive to their scheduled appointment on 8/16/19. No show letter #1 sent on 08/19/19. Thank you.

## 2019-08-22 ENCOUNTER — PATIENT OUTREACH (OUTPATIENT)
Dept: FAMILY MEDICINE CLINIC | Age: 69
End: 2019-08-22

## 2019-08-22 ENCOUNTER — PATIENT OUTREACH (OUTPATIENT)
Dept: CASE MANAGEMENT | Age: 69
End: 2019-08-22

## 2019-08-22 NOTE — PROGRESS NOTES
Community Care Team Documentation for Patient in [facility]     Patient discharged from [facility] to [facility] [address] on 8/8/19 . Hospital Discharge diagnosis:       Knee infection s/p knee arthroplasty    SNF Attending Provider:       Anticipated discharge date from SNF:  8/22/19    PCP : Delores Arevalo MD    Nurse Navigator: Kesha Baltazar RN    [facility] rounds completed, updates provided by facility. Brief Summary of Care:    [facility], Will be going home alone with friends to assist her. RWW ordered. IV antibiotics until 9/9/19 [facility]. PCP appt 9/20/19. [employer] denied extension on stay. RRAT:  Low Risk            3       Total Score        3 Has Seen PCP in Last 6 Months (Yes=3, No=0)        Criteria that do not apply:    . Living with Significant Other. Assisted Living. LTAC. SNF. or   Rehab    Patient Length of Stay (>5 days = 3)    IP Visits Last 12 Months (1-3=4, 4=9, >4=11)    Pt.  Coverage (Medicare=5 , Medicaid, or Self-Pay=4)    Charlson Comorbidity Score (Age + Comorbid Conditions)        Active Ambulatory Problems     Diagnosis Date Noted    Morbid obesity ([address] 11/26/2014    Depression 11/26/2014    Impaired hearing 11/26/2014    Cataract 11/19/2015    Stress incontinence of urine 11/24/2015    HTN (hypertension), benign 04/19/2016    Advance care planning 10/03/2016    Gastroesophageal reflux disease without esophagitis 10/26/2016    Osteoporosis 05/01/2017    Lymphedema of both lower extremities 08/27/2018    Dyslipidemia 05/16/2019    Herniated nucleus pulposus, lumbar 05/16/2019    Popliteal cyst, right 07/26/2019    Cellulitis 07/26/2019    Septic joint of right knee joint ([address] 07/28/2019     Resolved Ambulatory Problems     Diagnosis Date Noted    UTI (urinary tract infection) 10/19/2017     Past Medical History:   Diagnosis Date    Degenerative disk disease  Diffuse idiopathic skeletal hyperostosis     Fracture of leg     Gallstone     Lymphedema of leg 1975    Pulmonary arterial hypertension (HCC)     MANPREET (stress urinary incontinence, female)

## 2019-08-22 NOTE — PROGRESS NOTES
Follow up call today to confirm discharge of patient from SNF. Talked with Raffaele LEBLANC. Patient will be discharged later today for home. Neighbor will be picking her up. Patient will have [facility], SN, PT, OT. She will be on IV antibiotics through 9/9/19. Patient has all prescriptions, she is able to perform ADLs and IADLs. Neighbors are available to check in on her. A walker has been ordered. Patient has appointment 8/29 with surgeon, 9/12 with ID and PCP 9/20/19. Will continue to follow.

## 2019-08-23 ENCOUNTER — PATIENT OUTREACH (OUTPATIENT)
Dept: FAMILY MEDICINE CLINIC | Age: 69
End: 2019-08-23

## 2019-08-23 NOTE — PROGRESS NOTES
Karma Kenney, friend of patient, called NN. Verbal permission given to speak to her. She stated she did not have a time set up with the nurse tomorrow and wanted to make a schedule so she knew patient would receive medications as prescribed. NN gave her contact number for [facility] so they could give her nurse name and time. Also told John Choudhary to communicate to them there would be times she is not available and to make a back up plan with them. She verbalized understanding.

## 2019-08-26 ENCOUNTER — APPOINTMENT (OUTPATIENT)
Dept: GENERAL RADIOLOGY | Age: 69
End: 2019-08-26
Attending: EMERGENCY MEDICINE
Payer: MEDICARE

## 2019-08-26 ENCOUNTER — PATIENT OUTREACH (OUTPATIENT)
Dept: FAMILY MEDICINE CLINIC | Age: 69
End: 2019-08-26

## 2019-08-26 ENCOUNTER — APPOINTMENT (OUTPATIENT)
Dept: CT IMAGING | Age: 69
End: 2019-08-26
Attending: EMERGENCY MEDICINE
Payer: MEDICARE

## 2019-08-26 ENCOUNTER — HOSPITAL ENCOUNTER (EMERGENCY)
Age: 69
Discharge: HOME | End: 2019-08-26
Attending: EMERGENCY MEDICINE | Admitting: EMERGENCY MEDICINE
Payer: MEDICARE

## 2019-08-26 VITALS
SYSTOLIC BLOOD PRESSURE: 101 MMHG | HEIGHT: 60 IN | OXYGEN SATURATION: 100 % | RESPIRATION RATE: 18 BRPM | WEIGHT: 229 LBS | DIASTOLIC BLOOD PRESSURE: 87 MMHG | HEART RATE: 57 BPM | BODY MASS INDEX: 44.96 KG/M2 | TEMPERATURE: 97.7 F

## 2019-08-26 DIAGNOSIS — R06.02 SOB (SHORTNESS OF BREATH): ICD-10-CM

## 2019-08-26 DIAGNOSIS — R07.9 CHEST PAIN, UNSPECIFIED TYPE: Primary | ICD-10-CM

## 2019-08-26 LAB
ANION GAP SERPL CALC-SCNC: 7 MMOL/L (ref 3–18)
BASOPHILS # BLD: 0 K/UL (ref 0–0.1)
BASOPHILS NFR BLD: 1 % (ref 0–2)
BUN SERPL-MCNC: 10 MG/DL (ref 7–18)
BUN/CREAT SERPL: 14 (ref 12–20)
CALCIUM SERPL-MCNC: 9 MG/DL (ref 8.5–10.1)
CHLORIDE SERPL-SCNC: 110 MMOL/L (ref 100–111)
CK MB CFR SERPL CALC: ABNORMAL % (ref 0–4)
CK MB CFR SERPL CALC: ABNORMAL % (ref 0–4)
CK MB SERPL-MCNC: <1 NG/ML (ref 5–25)
CK MB SERPL-MCNC: <1 NG/ML (ref 5–25)
CK SERPL-CCNC: 24 U/L (ref 26–192)
CK SERPL-CCNC: 25 U/L (ref 26–192)
CO2 SERPL-SCNC: 25 MMOL/L (ref 21–32)
CREAT SERPL-MCNC: 0.7 MG/DL (ref 0.6–1.3)
D DIMER PPP FEU-MCNC: 3.46 UG/ML(FEU)
DIFFERENTIAL METHOD BLD: ABNORMAL
EOSINOPHIL # BLD: 0.1 K/UL (ref 0–0.4)
EOSINOPHIL NFR BLD: 3 % (ref 0–5)
ERYTHROCYTE [DISTWIDTH] IN BLOOD BY AUTOMATED COUNT: 18.4 % (ref 11.6–14.5)
GLUCOSE SERPL-MCNC: 77 MG/DL (ref 74–99)
HCT VFR BLD AUTO: 29.1 % (ref 35–45)
HGB BLD-MCNC: 9.2 G/DL (ref 12–16)
LYMPHOCYTES # BLD: 0.9 K/UL (ref 0.9–3.6)
LYMPHOCYTES NFR BLD: 25 % (ref 21–52)
MAGNESIUM SERPL-MCNC: 2.1 MG/DL (ref 1.6–2.6)
MCH RBC QN AUTO: 27.1 PG (ref 24–34)
MCHC RBC AUTO-ENTMCNC: 31.6 G/DL (ref 31–37)
MCV RBC AUTO: 85.8 FL (ref 74–97)
MONOCYTES # BLD: 0.5 K/UL (ref 0.05–1.2)
MONOCYTES NFR BLD: 13 % (ref 3–10)
NEUTS SEG # BLD: 2.2 K/UL (ref 1.8–8)
NEUTS SEG NFR BLD: 58 % (ref 40–73)
PLATELET # BLD AUTO: 207 K/UL (ref 135–420)
PMV BLD AUTO: 9.9 FL (ref 9.2–11.8)
POTASSIUM SERPL-SCNC: 3.5 MMOL/L (ref 3.5–5.5)
RBC # BLD AUTO: 3.39 M/UL (ref 4.2–5.3)
SODIUM SERPL-SCNC: 142 MMOL/L (ref 136–145)
TROPONIN I SERPL-MCNC: <0.02 NG/ML (ref 0–0.04)
TROPONIN I SERPL-MCNC: <0.02 NG/ML (ref 0–0.04)
WBC # BLD AUTO: 3.8 K/UL (ref 4.6–13.2)

## 2019-08-26 PROCEDURE — 74011000258 HC RX REV CODE- 258: Performed by: EMERGENCY MEDICINE

## 2019-08-26 PROCEDURE — 99285 EMERGENCY DEPT VISIT HI MDM: CPT

## 2019-08-26 PROCEDURE — 71275 CT ANGIOGRAPHY CHEST: CPT

## 2019-08-26 PROCEDURE — 93005 ELECTROCARDIOGRAM TRACING: CPT

## 2019-08-26 PROCEDURE — 83735 ASSAY OF MAGNESIUM: CPT

## 2019-08-26 PROCEDURE — 85379 FIBRIN DEGRADATION QUANT: CPT

## 2019-08-26 PROCEDURE — 71045 X-RAY EXAM CHEST 1 VIEW: CPT

## 2019-08-26 PROCEDURE — 85025 COMPLETE CBC W/AUTO DIFF WBC: CPT

## 2019-08-26 PROCEDURE — 82553 CREATINE MB FRACTION: CPT

## 2019-08-26 PROCEDURE — 80048 BASIC METABOLIC PNL TOTAL CA: CPT

## 2019-08-26 PROCEDURE — 74011636320 HC RX REV CODE- 636/320: Performed by: EMERGENCY MEDICINE

## 2019-08-26 RX ORDER — SODIUM CHLORIDE 9 MG/ML
100 INJECTION, SOLUTION INTRAVENOUS
Status: COMPLETED | OUTPATIENT
Start: 2019-08-26 | End: 2019-08-26

## 2019-08-26 RX ADMIN — SODIUM CHLORIDE 92 ML: 900 INJECTION, SOLUTION INTRAVENOUS at 17:33

## 2019-08-26 RX ADMIN — IOPAMIDOL 77 ML: 755 INJECTION, SOLUTION INTRAVENOUS at 17:32

## 2019-08-26 NOTE — ED PROVIDER NOTES
[facility] EMERGENCY DEPT      2:14 PM    Date: 8/26/2019  Patient Name: Srinath Moulton    History of Presenting Illness     Chief Complaint   Patient presents with    Chest Pain    Shortness of Breath       71 y.o. female with noted past medical history who presents to the emergency department with shortness of breath and chest pain. Patient states that she has no prior history of cardiac or pulmonary disorders and was doing well until yesterday morning when she woke up with some bilateral upper chest chest discomfort that went away on its own as she \"started moving around\" during the day. She states that she was doing fine until this morning when she woke up with similar chest discomfort but also has associated shortness of breath. Because that she came to the ER for evaluation and treatment. She denies any associated symptoms other than what is noted including no nausea or vomiting, no diaphoresis or palpitations, no arm neck or jaw pain. 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 Jamie Earl, on 7/29/2019. Patient states that since then she been walking with a cane but is been ambulatory and has not been sitting in bed for long periods of time. She denies any lower leg swelling or pain. Patient denies any other associated signs or symptoms. Patient denies any other complaints. Nursing notes regarding the HPI and triage nursing notes were reviewed. Prior medical records were reviewed. Current Outpatient Medications   Medication Sig Dispense Refill    sertraline (ZOLOFT) 50 mg tablet Take 1 Tab by mouth daily. 90 Tab 3    neomycin-polymyxin-hydrocortisone, buffered, (PEDIOTIC) 3.5-10,000-1 mg/mL-unit/mL-% otic suspension Administer 4 Drops in right ear four (4) times daily. 10 mL 0    alendronate (FOSAMAX) 70 mg tablet Take 1 Tab by mouth every seven (7) days.  12 Tab 1    ALPRAZolam (XANAX) 0.5 mg tablet Take 1 Tab by mouth three (3) times daily as needed for Anxiety. Max Daily Amount: 1.5 mg. 30 Tab 0    furosemide (LASIX) 40 mg tablet TAKE 1 TABLET BY MOUTH ONCE DAILY 30 Tab 2    losartan (COZAAR) 25 mg tablet TAKE 1 TABLET BY MOUTH ONCE DAILY 90 Tab 1    potassium chloride SR (KLOR-CON 10) 10 mEq tablet TAKE 1 TABLET BY MOUTH ONCE DAILY 90 Tab 1    ibuprofen (MOTRIN) 600 mg tablet TAKE 1 TABLET BY MOUTH EVERY 8 HOURS AS NEEDED FOR PAIN 30 Tab 2    atorvastatin (LIPITOR) 10 mg tablet TAKE 1 TABLET BY MOUTH ONCE DAILY 90 Tab 0    pantoprazole (PROTONIX) 40 mg tablet TAKE ONE TABLET BY MOUTH ONCE DAILY 90 Tab 3       Past History     Past Medical History:  Past Medical History:   Diagnosis Date    Degenerative disk disease     Depression     Depression     Diffuse idiopathic skeletal hyperostosis     Fracture of leg     Gallstone     Lymphedema of leg 1975    bilateral lower legs    Morbid obesity (HCC)     Pulmonary arterial hypertension (HCC)     MANPREET (stress urinary incontinence, female)        Past Surgical History:  Past Surgical History:   Procedure Laterality Date    HX HYSTERECTOMY      HX KNEE REPLACEMENT      HX ORTHOPAEDIC      right shoulder and right knee replaced x 2, left knee replaced as well       Family History:  Family History   Problem Relation Age of Onset    Heart Disease Father     Diabetes Father        Social History:  Social History     Tobacco Use    Smoking status: Never Smoker    Smokeless tobacco: Never Used   Substance Use Topics    Alcohol use: No     Alcohol/week: 0.0 standard drinks    Drug use: No       Allergies: Allergies   Allergen Reactions    Adhesive Other (comments)     Skin burns - red spots    Lisinopril Other (comments)    Nitrofurantoin Other (comments)       Patient's primary care provider (as noted in EPIC):  Alexandria Ying MD    Review of Systems   Constitutional: Negative for diaphoresis.    HENT: Negative for congestion. Eyes: Negative for discharge. Respiratory: Positive for shortness of breath. Negative for cough, chest tightness, wheezing and stridor. Cardiovascular: Positive for chest pain. Negative for palpitations. Gastrointestinal: Negative for diarrhea. Genitourinary: Negative for flank pain. Musculoskeletal: Negative for back pain. Neurological: Negative for weakness. Psychiatric/Behavioral: Negative for hallucinations. All other systems reviewed and are negative. Visit Vitals  /51   Pulse (!) 55   Temp 97.7 °F (36.5 °C)   Resp 17   Ht 5' (1.524 m)   Wt 103.9 kg (229 lb)   SpO2 100%   BMI 44.72 kg/m²       PHYSICAL EXAM:    CONSTITUTIONAL:  Alert, in no apparent distress;  well developed;  well nourished. HEAD:  Normocephalic, atraumatic. EYES:  EOMI. Non-icteric sclera. Normal conjunctiva. ENTM:  Nose:  no rhinorrhea. Throat:  no erythema or exudate, mucous membranes moist.  NECK:  No JVD. Supple  RESPIRATORY:  Chest clear, equal breath sounds, good air movement. CARDIOVASCULAR:  Regular rate and rhythm. No murmurs, rubs, or gallops. Chest:  No rash, lesions, bruising. No reproducible tenderness to palpation. GI:  Normal bowel sounds, abdomen soft and non-tender. No rebound or guarding. BACK:  Non-tender. UPPER EXT:  Normal inspection. LOWER EXT:  No edema, no calf tenderness. Distal pulses intact. NEURO:  Moves all four extremities, and grossly normal motor exam.  SKIN:  No rashes;  Normal for age. PSYCH:  Alert and normal affect.     DIFFERENTIAL DIAGNOSES/ MEDICAL DECISION MAKING:  Chest pain etiologies include acute cardiac events to include possible acute myocardial infarction, acute coronary syndrome, pneumonia, chest wall pain (myofascial/ musculoskeletal etiology), chronic obstructive pulmonary disease (copd), acute asthma exacerbation, congestive heart failure, acute bronchitis, pulmonary embolism, upper respiratory infection, referred abdominal pain, other etiologies, versus combination of the above.         Diagnostic Study Results     Abnormal lab results from this emergency department encounter:  Labs Reviewed   CBC WITH AUTOMATED DIFF - Abnormal; Notable for the following components:       Result Value    WBC 3.8 (*)     RBC 3.39 (*)     HGB 9.2 (*)     HCT 29.1 (*)     RDW 18.4 (*)     MONOCYTES 13 (*)     All other components within normal limits   CARDIAC PANEL,(CK, CKMB & TROPONIN) - Abnormal; Notable for the following components:    CK 25 (*)     All other components within normal limits   D DIMER - Abnormal; Notable for the following components:    D DIMER 3.46 (*)     All other components within normal limits   CARDIAC PANEL,(CK, CKMB & TROPONIN) - Abnormal; Notable for the following components:    CK 24 (*)     All other components within normal limits   METABOLIC PANEL, BASIC   MAGNESIUM       Lab values for this patient within approximately the last 12 hours:  Recent Results (from the past 12 hour(s))   EKG, 12 LEAD, INITIAL    Collection Time: 08/26/19  2:31 PM   Result Value Ref Range    Ventricular Rate 57 BPM    Atrial Rate 57 BPM    P-R Interval 158 ms    QRS Duration 128 ms    Q-T Interval 488 ms    QTC Calculation (Bezet) 474 ms    Calculated P Axis 39 degrees    Calculated R Axis -6 degrees    Calculated T Axis 16 degrees    Diagnosis       Sinus bradycardia with marked sinus arrhythmia  Right bundle branch block  Abnormal ECG  When compared with ECG of 27-JUL-2019 02:09,  No significant change was found     CBC WITH AUTOMATED DIFF    Collection Time: 08/26/19  3:04 PM   Result Value Ref Range    WBC 3.8 (L) 4.6 - 13.2 K/uL    RBC 3.39 (L) 4.20 - 5.30 M/uL    HGB 9.2 (L) 12.0 - 16.0 g/dL    HCT 29.1 (L) 35.0 - 45.0 %    MCV 85.8 74.0 - 97.0 FL    MCH 27.1 24.0 - 34.0 PG    MCHC 31.6 31.0 - 37.0 g/dL    RDW 18.4 (H) 11.6 - 14.5 %    PLATELET 372 200 - 771 K/uL    MPV 9.9 9.2 - 11.8 FL    NEUTROPHILS 58 40 - 73 %    LYMPHOCYTES 25 21 - 52 %    MONOCYTES 13 (H) 3 - 10 %    EOSINOPHILS 3 0 - 5 %    BASOPHILS 1 0 - 2 %    ABS. NEUTROPHILS 2.2 1.8 - 8.0 K/UL    ABS. LYMPHOCYTES 0.9 0.9 - 3.6 K/UL    ABS. MONOCYTES 0.5 0.05 - 1.2 K/UL    ABS. EOSINOPHILS 0.1 0.0 - 0.4 K/UL    ABS. BASOPHILS 0.0 0.0 - 0.1 K/UL    DF AUTOMATED     METABOLIC PANEL, BASIC    Collection Time: 08/26/19  3:04 PM   Result Value Ref Range    Sodium 142 136 - 145 mmol/L    Potassium 3.5 3.5 - 5.5 mmol/L    Chloride 110 100 - 111 mmol/L    CO2 25 21 - 32 mmol/L    Anion gap 7 3.0 - 18 mmol/L    Glucose 77 74 - 99 mg/dL    BUN 10 7.0 - 18 MG/DL    Creatinine 0.70 0.6 - 1.3 MG/DL    BUN/Creatinine ratio 14 12 - 20      GFR est AA >60 >60 ml/min/1.73m2    GFR est non-AA >60 >60 ml/min/1.73m2    Calcium 9.0 8.5 - 10.1 MG/DL   MAGNESIUM    Collection Time: 08/26/19  3:04 PM   Result Value Ref Range    Magnesium 2.1 1.6 - 2.6 mg/dL   CARDIAC PANEL,(CK, CKMB & TROPONIN)    Collection Time: 08/26/19  3:04 PM   Result Value Ref Range    CK 25 (L) 26 - 192 U/L    CK - MB <1.0 <3.6 ng/ml    CK-MB Index  0.0 - 4.0 %     CALCULATION NOT PERFORMED WHEN RESULT IS BELOW LINEAR LIMIT    Troponin-I, QT <0.02 0.0 - 0.045 NG/ML   D DIMER    Collection Time: 08/26/19  3:04 PM   Result Value Ref Range    D DIMER 3.46 (H) <0.46 ug/ml(FEU)   CARDIAC PANEL,(CK, CKMB & TROPONIN)    Collection Time: 08/26/19  6:32 PM   Result Value Ref Range    CK 24 (L) 26 - 192 U/L    CK - MB <1.0 <3.6 ng/ml    CK-MB Index  0.0 - 4.0 %     CALCULATION NOT PERFORMED WHEN RESULT IS BELOW LINEAR LIMIT    Troponin-I, QT <0.02 0.0 - 0.045 NG/ML       Radiologist and cardiologist interpretations if available at time of this note:  Cta Chest W Or W Wo Cont    Result Date: 8/26/2019  EXAM: CTA chest CLINICAL INDICATION/HISTORY:  Chest pain and dyspnea. COMPARISON: 05/24/2013.  TECHNIQUE: Axial CT imaging from the thoracic inlet through the diaphragm with intravenous contrast. Coronal and sagittal MIP reformats were generated. One or more dose reduction techniques were used on this CT: automated exposure control, adjustment of the mAs and/or kVp according to patient size, and iterative reconstruction techniques. The specific techniques used on this CT exam have been documented in the patient's electronic medical record. Digital Imaging and Communications in Medicine (DICOM) format image data are available to nonaffiliated external healthcare facilities or entities on a secure, media free, reciprocally searchable basis with patient authorization for at least a 12-month period after this study. _______________ FINDINGS: EXAM QUALITY: Adequate opacification of the pulmonary arteries. PULMONARY ARTERIES: No evidence of pulmonary embolism. MEDIASTINUM: Normal heart size. No evidence of right heart strain. Aorta is unremarkable. Right PICC terminates in the superior vena cava. No pericardial effusion. LUNGS: No suspicious nodule or mass. No abnormal opacities. PLEURA: Normal. AIRWAY: Normal. LYMPH NODES: No enlarged nodes. UPPER ABDOMEN: There is a small hiatal hernia. OTHER: No acute or aggressive osseous abnormalities identified. SUPERFICIAL SOFT TISSUES: Unremarkable. _______________     IMPRESSION: 1. No evidence of pulmonary embolism. 2. No focal consolidative process in the chest.    Xr Chest Port    Result Date: 8/26/2019  EXAM: CHEST RADIOGRAPH, SINGLE VIEW CLINICAL INDICATION/HISTORY: chest pain, sob, and/or arrhythmia      <Additional:  Shortness of breath and chest pain COMPARISON: 2/15/2019 TECHNIQUE: Portable frontal view of the chest was obtained. _______________ FINDINGS: SUPPORT DEVICES: None. HEART AND MEDIASTINUM: Cardiac silhouette is within normal range in size. Mild calcified plaque is present at the aortic arch. LUNGS AND PLEURAL SPACES: Pulmonary vessels are normal. Lungs are clear. No pneumothorax or pleural effusion. BONY THORAX AND SOFT TISSUES: Right shoulder arthroplasty in situ.  Small marginal osteophytes are redemonstrated at several thoracic disc levels. No acute change. _______________     IMPRESSION: No active cardiopulmonary disease. Sinus bradycardiaMedication(s) ordered for patient during this emergency visit encounter:  Medications   iopamidol (ISOVUE-370) 76 % injection 80 mL (77 mL IntraVENous Given 19 1732)   0.9% sodium chloride infusion 100 mL (92 mL IntraVENous Bolus 19 1733)       Medical Decision Making     I am the first provider for this patient. I reviewed the vital signs, available nursing notes, past medical history, past surgical history, family history and social history. Vital Signs:  Reviewed the patient's vital signs. Initial EKG interpretation by attending emergency physician: About 57 bpm with noted sinus arrhythmia. Right bundle branch block pattern. Prior EKG from 2019 also shows a market sinus bradycardia about 45 bpm with right bundle branch block. ED COURSE:      Patient's PERC screening was age. Cannot be assessed secondary to age. Given elevated D-dimer and patient's presentation and physical exam, will get diagnostic imaging to assess for possible pulmonary embolus. Although the preferred test to assess this would be a CTA chest with contrast, the patient's renal function was reviewed by getting appropriate serum labs. Tests to measure the patient's current level of renal function were ordered so that they would be made available to the radiologist, who would make the final decision about which test to order to evaluate a possible pulmonary embolus, whether it be a CTA chest with contrast or a V/Q scan, along with recommendations that may be made be the radiologist regarding patient preparation for the study, both pre- and/or post-study. Patient's HAART SCORE:    History: 1. EC  Age: 2. Risk factors: 0. Troponin:  0    Patient's Total HAART score:   3    Two sets of cardiac enzymes were normal. IMPRESSION AND MEDICAL DECISION MAKING:  Based upon the patients presentation with noted HPI and PE, along with the work up done in the emergency department, I believe that the patient is having non-cardiac chest pain as noted. Given the time frame of the patients chest pain, two sets of cardiac enzymes were done to rule out an acute cardiac event. DIAGNOSIS:  1. Chest pain    SPECIFIC PATIENT INSTRUCTIONS FROM THE EMERGENCY PHYSICIAN WHO TREATED YOU TODAY:  1. Return if worse. 2. Follow up with your primary doctor or your cardiologist (if you have one) in the next 2-4 days for reevaluation. Patient is improved, resting quietly and comfortably. The patient will be discharged home. The patient was reassured that these symptoms do not appear to represent a serious or life threatening condition at this time. Warning signs of worsening condition were discussed and understood by the patient. Based on patient's age, coexisting illness, exam, and the results of this ED evaluation, the decision to treat as an outpatient was made. Based on the information available at time of discharge, acute pathology requiring immediate intervention was deemed relative unlikely. While it is impossible to completely exclude the possibility of underlying serious disease or worsening of condition, I feel the relative likelihood is extremely low. I discussed this uncertainty with the patient, who understood ED evaluation and treatment and felt comfortable with the outpatient treatment plan. All questions regarding care, test results, and follow up were answered. The patient is stable and appropriate to discharge. They understand that they should return to the emergency department for any new or worsening symptoms. I stressed the importance of follow up for repeat assessment and possibly further evaluation/treatment.     Dictation disclaimer:  Please note that this dictation was completed with [employer] computer voice recognition software. Quite often unanticipated grammatical, syntax, homophones, and other interpretive errors are inadvertently transcribed by the computer software. Please disregard these errors. Please excuse any errors that have escaped final proofreading. Coding Diagnoses     Clinical Impression:   1. Chest pain, unspecified type    2. SOB (shortness of breath)        Disposition     Disposition:  Home. FANNY Milton Board Certified Emergency Physician    Provider Attestation:  If a scribe was utilized in generation of this patient record, I personally performed the services described in the documentation, reviewed the documentation, as recorded by the scribe in my presence, and it accurately records the patient's history of presenting illness, review of systems, patient physical examination, and procedures performed by me as the attending physician. FANNY Milton Board Certified Emergency Physician  8/26/2019.  2:16 PM

## 2019-08-26 NOTE — DISCHARGE INSTRUCTIONS
SPECIFIC PATIENT INSTRUCTIONS FROM THE EMERGENCY PHYSICIAN WHO TREATED YOU TODAY:  1. Return if worse. 2. Follow up with your primary doctor or your cardiologist (if you have one) in the next 2-4 days for reevaluation. Patient Education        Chest Pain: Care Instructions  Your Care Instructions    There are many things that can cause chest pain. Some are not serious and will get better on their own in a few days. But some kinds of chest pain need more testing and treatment. Your doctor may have recommended a follow-up visit in the next 8 to 12 hours. If you are not getting better, you may need more tests or treatment. Even though your doctor has released you, you still need to watch for any problems. The doctor carefully checked you, but sometimes problems can develop later. If you have new symptoms or if your symptoms do not get better, get medical care right away. If you have worse or different chest pain or pressure that lasts more than 5 minutes or you passed out (lost consciousness), call 911 or seek other emergency help right away. A medical visit is only one step in your treatment. Even if you feel better, you still need to do what your doctor recommends, such as going to all suggested follow-up appointments and taking medicines exactly as directed. This will help you recover and help prevent future problems. How can you care for yourself at home? · Rest until you feel better. · Take your medicine exactly as prescribed. Call your doctor if you think you are having a problem with your medicine. · Do not drive after taking a prescription pain medicine. When should you call for help? Call 911 if:    · You passed out (lost consciousness).     · You have severe difficulty breathing.     · You have symptoms of a heart attack. These may include:  ? Chest pain or pressure, or a strange feeling in your chest.  ? Sweating. ? Shortness of breath. ? Nausea or vomiting.   ? Pain, pressure, or a strange feeling in your back, neck, jaw, or upper belly or in one or both shoulders or arms. ? Lightheadedness or sudden weakness. ? A fast or irregular heartbeat. After you call 911, the  may tell you to chew 1 adult-strength or 2 to 4 low-dose aspirin. Wait for an ambulance. Do not try to drive yourself.    Call your doctor today if:    · You have any trouble breathing.     · Your chest pain gets worse.     · You are dizzy or lightheaded, or you feel like you may faint.     · You are not getting better as expected.     · You are having new or different chest pain. Where can you learn more? Go to http://chelsea-austin.info/. Enter A120 in the search box to learn more about \"Chest Pain: Care Instructions. \"  Current as of: September 23, 2018  Content Version: 12.1  © 8092-9750 [employer]. Care instructions adapted under license by [employer] (which disclaims liability or warranty for this information). If you have questions about a medical condition or this instruction, always ask your healthcare professional. [place] [address] any warranty or liability for your use of this information. Patient Education        Shortness of Breath: Care Instructions  Your Care Instructions  Shortness of breath has many causes. Sometimes conditions such as anxiety can lead to shortness of breath. Some people get mild shortness of breath when they exercise. Trouble breathing also can be a symptom of a serious problem, such as asthma, lung disease, emphysema, heart problems, and pneumonia. If your shortness of breath continues, you may need tests and treatment. Watch for any changes in your breathing and other symptoms. Follow-up care is a key part of your treatment and safety. Be sure to make and go to all appointments, and call your doctor if you are having problems.  It's also a good idea to know your test results and keep a list of the medicines you take.  How can you care for yourself at home? · Do not smoke or allow others to smoke around you. If you need help quitting, talk to your doctor about stop-smoking programs and medicines. These can increase your chances of quitting for good. · Get plenty of rest and sleep. · Take your medicines exactly as prescribed. Call your doctor if you think you are having a problem with your medicine. · Find healthy ways to deal with stress. ? Exercise daily. ? Get plenty of sleep. ? Eat regularly and well. When should you call for help? Call 911 anytime you think you may need emergency care. For example, call if:    · You have severe shortness of breath.     · You have symptoms of a heart attack. These may include:  ? Chest pain or pressure, or a strange feeling in the chest.  ? Sweating. ? Shortness of breath. ? Nausea or vomiting. ? Pain, pressure, or a strange feeling in the back, neck, jaw, or upper belly or in one or both shoulders or arms. ? Lightheadedness or sudden weakness. ? A fast or irregular heartbeat. After you call 911, the  may tell you to chew 1 adult-strength or 2 to 4 low-dose aspirin. Wait for an ambulance. Do not try to drive yourself.    Call your doctor now or seek immediate medical care if:    · Your shortness of breath gets worse or you start to wheeze. Wheezing is a high-pitched sound when you breathe.     · You wake up at night out of breath or have to prop your head up on several pillows to breathe.     · You are short of breath after only light activity or while at rest.    Watch closely for changes in your health, and be sure to contact your doctor if:    · You do not get better over the next 1 to 2 days. Where can you learn more? Go to http://chelsea-austin.info/. Enter S780 in the search box to learn more about \"Shortness of Breath: Care Instructions. \"  Current as of: September 5, 2018  Content Version: 12.1  © 6742-4857 [employer]wise[employer]. Care instructions adapted under license by [employer] (which disclaims liability or warranty for this information). If you have questions about a medical condition or this instruction, always ask your healthcare professional. [address] any warranty or liability for your use of this information. KidzVuz Activation    Thank you for requesting access to [employer]. Please follow the instructions below to securely access and download your online medical record. KidzVuz allows you to send messages to your doctor, view your test results, renew your prescriptions, schedule appointments, and more. How Do I Sign Up? In your internet browser, go to [email or web address]. Click on the First Time User? Click Here link in the Sign In box. You will see the New Member Sign Up page. Enter your KidzVuz Access Code exactly as it appears below. You will not need to use this code after you´ve completed the sign-up process. If you do not sign up before the expiration date, you must request a new code. KidzVuz Access Code: [email or web address]  Expires: 3/28/2019  2:27 PM (This is the date your KidzVuz access code will )    Enter the last four digits of your Social Security Number (xxxx) and Date of Birth (mm/dd/yyyy) as indicated and click Submit. You will be taken to the next sign-up page. Create a KidzVuz ID. This will be your KidzVuz login ID and cannot be changed, so think of one that is secure and easy to remember. Create a KidzVuz password. You can change your password at any time. Enter your Password Reset Question and Answer. This can be used at a later time if you forget your password. Enter your e-mail address. You will receive e-mail notification when new information is available in [address] Click Sign Up. You can now view and download portions of your medical record.   Click the [place] link to download a portable copy of your medical information. Additional Information    If you have questions, please visit the Frequently Asked Questions section of the [employer] website at [email or web address] Remember, [employer] is NOT to be used for urgent needs. For medical emergencies, dial 911.

## 2019-08-26 NOTE — PROGRESS NOTES
Called patient to follow up on how she is doing since discharged home. LM with NN contact information requesting a return call for any needs. Will continue to follow.

## 2019-08-26 NOTE — ED NOTES
7:47 PM  08/26/19     Discharge instructions given to patient (name) with verbalization of understanding. Patient accompanied by self. Patient discharged with the following prescriptions none. Patient discharged to home (destination).       Mariela Pandya RN

## 2019-08-28 LAB
ATRIAL RATE: 57 BPM
CALCULATED P AXIS, ECG09: 39 DEGREES
CALCULATED R AXIS, ECG10: -6 DEGREES
CALCULATED T AXIS, ECG11: 16 DEGREES
DIAGNOSIS, 93000: NORMAL
P-R INTERVAL, ECG05: 158 MS
Q-T INTERVAL, ECG07: 488 MS
QRS DURATION, ECG06: 128 MS
QTC CALCULATION (BEZET), ECG08: 474 MS
VENTRICULAR RATE, ECG03: 57 BPM

## 2019-08-29 ENCOUNTER — PATIENT OUTREACH (OUTPATIENT)
Dept: FAMILY MEDICINE CLINIC | Age: 69
End: 2019-08-29

## 2019-08-30 DIAGNOSIS — E78.00 HYPERCHOLESTEREMIA: ICD-10-CM

## 2019-08-30 RX ORDER — ATORVASTATIN CALCIUM 10 MG/1
10 TABLET, FILM COATED ORAL DAILY
Qty: 30 TAB | Refills: 0 | Status: SHIPPED | OUTPATIENT
Start: 2019-08-30 | End: 2019-09-20 | Stop reason: SDUPTHER

## 2019-08-30 NOTE — TELEPHONE ENCOUNTER
Refilled a 30-day supply of atorvastatin to last her until her next appointment with Dr. Gabo Danielle.

## 2019-09-19 ENCOUNTER — TELEPHONE (OUTPATIENT)
Dept: FAMILY MEDICINE CLINIC | Age: 69
End: 2019-09-19

## 2019-09-19 NOTE — TELEPHONE ENCOUNTER
Suraj Martin from [facility] is requesting for pt. To be treated for Lymphedema treatment with another agency because they do not have a physician to treat her currently.  Please assist.

## 2019-09-20 ENCOUNTER — OFFICE VISIT (OUTPATIENT)
Dept: FAMILY MEDICINE CLINIC | Age: 69
End: 2019-09-20

## 2019-09-20 ENCOUNTER — HOSPITAL ENCOUNTER (OUTPATIENT)
Dept: LAB | Age: 69
Discharge: HOME | End: 2019-09-20
Payer: MEDICARE

## 2019-09-20 VITALS
HEART RATE: 58 BPM | DIASTOLIC BLOOD PRESSURE: 60 MMHG | BODY MASS INDEX: 44.37 KG/M2 | TEMPERATURE: 97.8 F | SYSTOLIC BLOOD PRESSURE: 139 MMHG | RESPIRATION RATE: 16 BRPM | HEIGHT: 60 IN | OXYGEN SATURATION: 99 % | WEIGHT: 226 LBS

## 2019-09-20 DIAGNOSIS — F41.8 DEPRESSION WITH ANXIETY: ICD-10-CM

## 2019-09-20 DIAGNOSIS — Z71.89 ADVANCE CARE PLANNING: ICD-10-CM

## 2019-09-20 DIAGNOSIS — E78.00 HYPERCHOLESTEREMIA: ICD-10-CM

## 2019-09-20 DIAGNOSIS — Z23 NEEDS FLU SHOT: ICD-10-CM

## 2019-09-20 DIAGNOSIS — I89.0 LYMPHEDEMA OF BOTH LOWER EXTREMITIES: ICD-10-CM

## 2019-09-20 DIAGNOSIS — I10 ESSENTIAL HYPERTENSION: ICD-10-CM

## 2019-09-20 DIAGNOSIS — Z00.00 MEDICARE ANNUAL WELLNESS VISIT, SUBSEQUENT: Primary | ICD-10-CM

## 2019-09-20 DIAGNOSIS — R41.3 MEMORY DIFFICULTIES: ICD-10-CM

## 2019-09-20 DIAGNOSIS — G89.29 CHRONIC PAIN OF RIGHT KNEE: ICD-10-CM

## 2019-09-20 DIAGNOSIS — R07.89 CHEST TIGHTNESS: ICD-10-CM

## 2019-09-20 DIAGNOSIS — E66.01 MORBID OBESITY (HCC): ICD-10-CM

## 2019-09-20 DIAGNOSIS — L65.9 HAIR LOSS: ICD-10-CM

## 2019-09-20 DIAGNOSIS — R35.0 URINARY FREQUENCY: ICD-10-CM

## 2019-09-20 DIAGNOSIS — M25.561 CHRONIC PAIN OF RIGHT KNEE: ICD-10-CM

## 2019-09-20 DIAGNOSIS — Z23 ENCOUNTER FOR IMMUNIZATION: ICD-10-CM

## 2019-09-20 DIAGNOSIS — M94.0 COSTOCHONDRITIS: ICD-10-CM

## 2019-09-20 DIAGNOSIS — H72.91 PERFORATION OF RIGHT TYMPANIC MEMBRANE: ICD-10-CM

## 2019-09-20 LAB — TSH SERPL DL<=0.05 MIU/L-ACNC: 2.67 UIU/ML (ref 0.36–3.74)

## 2019-09-20 PROCEDURE — 84443 ASSAY THYROID STIM HORMONE: CPT

## 2019-09-20 PROCEDURE — 36415 COLL VENOUS BLD VENIPUNCTURE: CPT

## 2019-09-20 RX ORDER — LOSARTAN POTASSIUM 25 MG/1
TABLET ORAL
Qty: 90 TAB | Refills: 3 | Status: SHIPPED | OUTPATIENT
Start: 2019-09-20 | End: 2020-11-23

## 2019-09-20 RX ORDER — ATORVASTATIN CALCIUM 10 MG/1
10 TABLET, FILM COATED ORAL DAILY
Qty: 90 TAB | Refills: 3 | Status: SHIPPED | OUTPATIENT
Start: 2019-09-20 | End: 2020-10-13

## 2019-09-20 RX ORDER — OXYCODONE HYDROCHLORIDE AND IBUPROFEN 5; 400 MG/1; MG/1
1 TABLET, FILM COATED ORAL
COMMUNITY
End: 2019-11-04 | Stop reason: ALTCHOICE

## 2019-09-20 RX ORDER — OXYBUTYNIN CHLORIDE 5 MG/1
5 TABLET ORAL 3 TIMES DAILY
Qty: 90 TAB | Refills: 3 | Status: SHIPPED | OUTPATIENT
Start: 2019-09-20 | End: 2020-02-18

## 2019-09-20 RX ORDER — LANOLIN ALCOHOL/MO/W.PET/CERES
CREAM (GRAM) TOPICAL
COMMUNITY
End: 2019-11-04 | Stop reason: SDUPTHER

## 2019-09-20 RX ORDER — CEPHALEXIN 500 MG/1
500 CAPSULE ORAL 4 TIMES DAILY
COMMUNITY
End: 2019-11-04 | Stop reason: ALTCHOICE

## 2019-09-20 RX ORDER — DONEPEZIL HYDROCHLORIDE 5 MG/1
5 TABLET, FILM COATED ORAL
Qty: 30 TAB | Refills: 2 | Status: SHIPPED | OUTPATIENT
Start: 2019-09-20 | End: 2019-12-04 | Stop reason: SDUPTHER

## 2019-09-20 NOTE — PROGRESS NOTES
1ave you been to the ER, urgent care clinic since your last visit? Hospitalized since your last visit? Yes, 8/26/19, [facility], Chest pain, SOB    2. Have you seen or consulted any other health care providers outside of the [address] [place] [address] since your last visit? Include any pap smears or colon screening. No     Patient presents in office today for routine care.   Patient concerns: med refills, med eval

## 2019-09-20 NOTE — PROGRESS NOTES
(AWV) The Initial Medicare Annual Wellness Exam PROGRESS NOTE    This is an Initial Medicare Annual Wellness Exam (AWV) (Performed 12 months after IPPE or effective date of Medicare Part B enrollment, Once in a lifetime)    I have reviewed the patient's medical history in detail and updated the computerized patient record. Tyrell Alegre is a 71 y.o.  female and presents for an annual wellness exam       ROS   Constitutional: Positive for weight loss. Negative for chills, diaphoresis and fever. HENT: Positive for hearing loss. Negative for congestion. Eyes: Negative for blurred vision. Respiratory: Negative for cough and shortness of breath. Cardiovascular: Positive for chest pain, palpitations and leg swelling. Negative for orthopnea. Gastrointestinal: Negative for abdominal pain, constipation, diarrhea, nausea and vomiting. Genitourinary: Positive for frequency. Negative for dysuria. Musculoskeletal: Positive for back pain. Skin: Positive for itching. Neurological: Negative for dizziness, tingling and tremors. Psychiatric/Behavioral: Positive for memory loss. Negative for depression. The patient is nervous/anxious. All other systems reviewed and are negative. History     Past Medical History:   Diagnosis Date    Degenerative disk disease     Depression     Depression     Diffuse idiopathic skeletal hyperostosis     Fracture of leg     Gallstone     Lymphedema of leg 1975    bilateral lower legs    Morbid obesity ([address]     Pulmonary arterial hypertension (HCC)     MANPREET (stress urinary incontinence, female)       Past Surgical History:   Procedure Laterality Date    HX HYSTERECTOMY      HX KNEE REPLACEMENT      HX ORTHOPAEDIC      right shoulder and right knee replaced x 2, left knee replaced as well     Current Outpatient Medications   Medication Sig Dispense Refill    cephALEXin (KEFLEX) 500 mg capsule Take 500 mg by mouth four (4) times daily.       ferrous sulfate (IRON) 325 mg (65 mg iron) cpER Take  by mouth.  melatonin 3 mg tablet Take  by mouth.  ibuprofen-oxyCODONE (COMBUNOX) 400-5 mg per tablet Take 1 Tab by mouth every six (6) hours as needed for Pain.  atorvastatin (LIPITOR) 10 mg tablet Take 1 Tab by mouth daily. 30 Tab 0    sertraline (ZOLOFT) 50 mg tablet Take 1 Tab by mouth daily. 90 Tab 3    neomycin-polymyxin-hydrocortisone, buffered, (PEDIOTIC) 3.5-10,000-1 mg/mL-unit/mL-% otic suspension Administer 4 Drops in right ear four (4) times daily. 10 mL 0    alendronate (FOSAMAX) 70 mg tablet Take 1 Tab by mouth every seven (7) days. 12 Tab 1    ALPRAZolam (XANAX) 0.5 mg tablet Take 1 Tab by mouth three (3) times daily as needed for Anxiety.  Max Daily Amount: 1.5 mg. 30 Tab 0    furosemide (LASIX) 40 mg tablet TAKE 1 TABLET BY MOUTH ONCE DAILY 30 Tab 2    losartan (COZAAR) 25 mg tablet TAKE 1 TABLET BY MOUTH ONCE DAILY 90 Tab 1    potassium chloride SR (KLOR-CON 10) 10 mEq tablet TAKE 1 TABLET BY MOUTH ONCE DAILY 90 Tab 1    ibuprofen (MOTRIN) 600 mg tablet TAKE 1 TABLET BY MOUTH EVERY 8 HOURS AS NEEDED FOR PAIN 30 Tab 2    pantoprazole (PROTONIX) 40 mg tablet TAKE ONE TABLET BY MOUTH ONCE DAILY 90 Tab 3     Allergies   Allergen Reactions    Adhesive Other (comments)     Skin burns - red spots    Lisinopril Other (comments)    Nitrofurantoin Other (comments)     Family History   Problem Relation Age of Onset    Heart Disease Father     Diabetes Father      Social History     Tobacco Use    Smoking status: Never Smoker    Smokeless tobacco: Never Used   Substance Use Topics    Alcohol use: No     Alcohol/week: 0.0 standard drinks     Patient Active Problem List   Diagnosis Code    Morbid obesity (HCC) E66.01    Depression F32.9    Impaired hearing H91.90    Cataract H26.9    Stress incontinence of urine N39.3    HTN (hypertension), benign I10    Advance care planning Z71.89    Gastroesophageal reflux disease without esophagitis K21.9    Osteoporosis M81.0    Lymphedema of both lower extremities I89.0    Dyslipidemia E78.5    Herniated nucleus pulposus, lumbar M51.26    Popliteal cyst, right M71.21    Cellulitis L03.90    Septic joint of right knee joint (HCC) M00.9       Health Maintenance History  Immunizations reviewed, dtap up to date , pneumovax up to daete, flu due, zoster due  Colonoscopy: up to date,   Eye exam: due  Mammo up to Nataly Sutton up to date        Depression Risk Factor Screening:      Patient Health Questionnaire (PHQ-2)   Over the last 2 weeks, how often have you been bothered by any of the following problems? · Little interest or pleasure in doing things? · Not at all. [0]  · Feeling down, depressed, or hopeless? · Not at all. [0]    Total Score: 0/6  PHQ-2 Assessment Scoring:   A score of 2 or more requires further screening with the PHQ-9    Alcohol Risk Factor Screening:     Women: On any occasion during the past 3 months, have you had more than 3 drinks containing alcohol? no   Do you average more than 7 drinks per week? no    Functional Ability and Level of Safety:     Hearing Loss    Hearing is good. Activities of Daily Living   Self-care. Requires assistance with: ambulation and no ADLs    Fall Risk   Ambulatory aid furniture (30 pts)  Gait weak (10 pts)  Score: 40    Abuse Screen   Patient is not abused    Examination   Physical Examination  Vitals:    09/20/19 0810   BP: 139/60   Pulse: (!) 58   Resp: 16   Temp: 97.8 °F (36.6 °C)   TempSrc: Oral   SpO2: 99%   Weight: 226 lb (102.5 kg)   Height: 5' (1.524 m)   PainSc:   0 - No pain      Body mass index is 44.14 kg/m².      Evaluation of Cognitive Function:  Mood/affect:good mood with appropriate affect  Appearance: well kempt  Family member/caregiver input: concerned with medication compliance    alert, well appearing, and in no distress, oriented to person, place, and time and morbidly    Patient Care Team:  [facility], MD as PCP - General (Family Practice)  Tricia Pereyra MD (Orthopedic Surgery)  Misha Gamboa MD (Pain Management)  Sherita Davila, RN as Ambulatory Care Navigator    End-of-life planning  Advanced Directive in the case than an injury or illness causes the patient to be unable to make health care decisions    Health Care Directive or Living Will: yes    Advice/Referrals/Counselling/Plan:   Education and counseling provided:  Are appropriate based on today's review and evaluation  End-of-Life planning (with patient's consent)  Influenza Vaccine  Diabetes screening test  shingles  Include in education list (weight loss, physical activity, smoking cessation, fall prevention, and nutrition)    ICD-10-CM ICD-9-CM    1. Medicare annual wellness visit, subsequent Z00.00 V70.0    2. Advance care planning Z71.89 V65.49    3. Lymphedema of both lower extremities I89.0 457.1 AMB SUPPLY ORDER      AMB SUPPLY ORDER   4. Perforation of right tympanic membrane H72.91 384.20 REFERRAL TO ENT-OTOLARYNGOLOGY   5. Depression with anxiety F41.8 300.4    6. Chronic pain of right knee M25.561 719.46 AMB SUPPLY ORDER    G89.29 338.29    7. Morbid obesity (HCC) E66.01 278.01 AMB SUPPLY ORDER   8. Hair loss L65.9 704.00 TSH 3RD GENERATION   9. Chest tightness R07.89 786.59    10. Costochondritis M94.0 733.6    11. Hypercholesteremia E78.00 272.0 atorvastatin (LIPITOR) 10 mg tablet   12. Memory difficulties R41.3 780.93 donepezil (ARICEPT) 5 mg tablet   13. Needs flu shot Z23 V04.81 ADMIN INFLUENZA VIRUS VAC   14. Urinary frequency R35.0 788.41 oxybutynin (DITROPAN) 5 mg tablet   15. Essential hypertension I10 401.9 losartan (COZAAR) 25 mg tablet   16. Encounter for immunization Z23 V03.89 INFLUENZA VACCINE INACTIVATED (IIV), SUBUNIT, ADJUVANTED, IM   . Brief written plan, checklist    I have discussed the diagnosis with the patient and the intended plan as seen in the above orders.   The patient has received an after-visit summary and questions were answered concerning future plans. I have discussed medication side effects and warnings with the patient as well. I have reviewed the plan of care with the patient, accepted their input and they are in agreement with the treatment goals. ____________________________________________________________    Problem Assessment    for treatment of   Chief Complaint   Patient presents with    Memory Loss    Hypertension         SUBJECTIVE    Ms. Julito Lopez presents today for worsening lymphedema of the lower extremities, chest/back tightness at night and in the morning, worsening memory loss, and a 6 month history of hair loss. The patient's physical therapist recommending that she get a pump for her lymphedema which is severely limiting her mobility. Conservative treatments failed: Elevation and exercise or class 1 compression socks     She would also like to get a hospital bed because her current bed is too high so she has being sleeping in her recliner. She also reports significant hair loss over the last 6 months. She says her hair is \"falling out like crazy\" and that it is much thinner than it used to be. She also reports dry/itchy skin, cold intolerance, and slightly decreased apatite accompanied with weight loss.      Memory loss - The pt reports memory loss over the last year and half, since her  passed away. She says she forgets words in the middle of sentences, forgets names, and is having trouble remembering the words to her favorite songs. She denies long-term memory loss or getting lost in familiar places. The pt was adopted and so is unaware of any family history of dementia.      Chest/back tightness at night and in the morning-   The patient reports waking up most mornings feeling like a band is squeezing and tightening around her chest and back sometimes w/ heart palpitations.   She doesn't report any other associated symptoms such as radiating pain to arm/jaw, paresthesia, diaphoresis, or dyspnea. The patient feels that this may be related to her sleeping upright at a 45 degree angle in her recliner or related to her anxiety.      General:  Alert and Responsive and in No acute distress. HEENT: pupils round and reactive to light, oropharynx clear and symmetrically raised, no erythema/exudate. Thyroid normal size and non-tender. No conjunctival pallor. Some retraction of RT ear upper tympanic member. CV:  RRR, no rubs gallops or murmers   RESP:  Unlabored breathing. Lungs clear to auscultation. no wheeze, rales, or rhonchi. ABD:  Soft, nontender, nondistended. MS:  No joint deformity. Limited ROM of lower extremities due to pain. Pain in RT hip upon flexion of RT knee. Pain on palpation of lumbar muscles. Costochondritis tenderness. Neuro: Spelled \"world\" backwards, recalled 3/3 objects after 10 minutes. Ext: 3+ non-pitting edema of bilateral LE. 2+ radial pulses. Cap refill < 2 sec  Skin:  No rashes, lesions, or ulcers. Assessment/Plan:    1. Medicare annual wellness visit, subsequent  Reviewed preventive recommendations  2. Advance care planning  See ACP    3. Lymphedema of both lower extremities  Symptoms: Stage 3 Lymphedema  Conservative treatments failed: Elevation and exercise or class 1 compression socks  Symptoms persist   Needs sequential pump  - AMB SUPPLY ORDER  - AMB SUPPLY ORDER    4. Perforation of right tympanic membrane    - REFERRAL TO ENT-OTOLARYNGOLOGY    5. Depression with anxiety      6. Chronic pain of right knee    - AMB SUPPLY ORDER    7. Morbid obesity (HCC)    - AMB SUPPLY ORDER    8. Hair loss    - TSH 3RD GENERATION; Future    9. Chest tightness  Reassured and recommend nsaid    10. Costochondritis      11. Hypercholesteremia  Continue statin  - atorvastatin (LIPITOR) 10 mg tablet; Take 1 Tab by mouth daily. Dispense: 90 Tab; Refill: 3    12. Memory difficulties    - donepezil (ARICEPT) 5 mg tablet; Take 1 Tab by mouth nightly. Dispense: 30 Tab; Refill: 2    13. Needs flu shot    - ADMIN INFLUENZA VIRUS VAC    14. Urinary frequency    - oxybutynin (DITROPAN) 5 mg tablet; Take 1 Tab by mouth three (3) times daily. Dispense: 90 Tab; Refill: 3    15. Essential hypertension  Goal <130/80  - losartan (COZAAR) 25 mg tablet; TAKE 1 TABLET BY MOUTH ONCE DAILY  Dispense: 90 Tab; Refill: 3    16.  Encounter for immunization    - INFLUENZA VACCINE INACTIVATED (IIV), SUBUNIT, ADJUVANTED, IM      Lab review: orders written for new lab studies as appropriate; see orders

## 2019-09-20 NOTE — PROGRESS NOTES
*ATTENTION:  This note has been created by a medical student for educational purposes only. Please do not refer to the content of this note for clinical decision-making, billing, or other purposes. Please see attending physicians note to obtain clinical information on this patient. *         Medical Student Progress Note         Patient: Arvind Ellison MRN: [id number]  CSN: [id number]    YOB: 1950  Age: 71 y.o. Sex: female    DOA: (Not on file) LOS:  LOS: 0 days                    Subjective:     Mrs. Evie Agrawal is a 71 y.o. WF with PMH of HTN, GERD, osteoporosis, stress incontinence, lymphedema of bilateral LE, depression, and septic arthritis of the RT knee. She presented today for worsening lymphedema of the lower extremities, chest/back tightness at night and in the morning, worsening memory loss, and a 6 month history of hair loss. The patient's physical therapist recommending that she get a pump for her lymphedema which is severely limiting her mobility. She would also like to get a hospital bed because her current bed is too high so she has being sleeping in her recliner. She also reports significant hair loss over the last 6 months. She says her hair is \"falling out like crazy\" and that it is much thinner than it used to be. She also reports dry/itchy skin, cold intolerance, and slightly decreased apatite accompanied with weight loss. Memory loss - The pt reports memory loss over the last year and half, since her  passed away. She says she forgets words in the middle of sentences, forgets names, and is having trouble remembering the words to her favorite songs. She denies long-term memory loss or getting lost in familiar places. The pt was adopted and so is unaware of any family history of dementia.      Chest/back tightness at night and in the morning-   The patient reports waking up most mornings feeling like a band is squeezing and tightening around her chest and back sometimes w/ heart palpitations. She doesn't report any other associated symptoms such as radiating pain to arm/jaw, paresthesia, diaphoresis, or dyspnea. The patient feels that this may be related to her sleeping upright at a 45 degree angle in her recliner or related to her anxiety. Social history:  Never used tobacco  Doesn't use Alcohol  Doesn't use any other recreational drugs          Review of Systems   Constitutional: Positive for weight loss. Negative for chills, diaphoresis and fever. HENT: Positive for hearing loss. Negative for congestion. Eyes: Negative for blurred vision. Respiratory: Negative for cough and shortness of breath. Cardiovascular: Positive for chest pain, palpitations and leg swelling. Negative for orthopnea. Gastrointestinal: Negative for abdominal pain, constipation, diarrhea, nausea and vomiting. Genitourinary: Positive for frequency. Negative for dysuria. Musculoskeletal: Positive for back pain. Skin: Positive for itching. Neurological: Negative for dizziness, tingling and tremors. Psychiatric/Behavioral: Positive for memory loss. Negative for depression. The patient is nervous/anxious. Objective:      Patient Vitals for the past 24 hrs:   Temp Pulse Resp BP SpO2   09/20/19 0810 97.8 °F (36.6 °C) (!) 58 16 139/60 99 %         Physical Exam:   General:  Alert and Responsive and in No acute distress. HEENT: pupils round and reactive to light, oropharynx clear and symmetrically raised, no erythema/exudate. Thyroid normal size and non-tender. No conjunctival pallor. Some retraction of RT ear upper tympanic member. CV:  RRR, no rubs gallops or murmers   RESP:  Unlabored breathing. Lungs clear to auscultation. no wheeze, rales, or rhonchi. ABD:  Soft, nontender, nondistended. MS:  No joint deformity. Limited ROM of lower extremities due to pain. Pain in RT hip upon flexion of RT knee. Pain on palpation of lumbar muscles.  Costochondritis tenderness. Neuro: Spelled \"world\" backwards, recalled 3/3 objects after 10 minutes. Ext: 3+ non-pitting edema of bilateral LE. 2+ radial pulses. Cap refill < 2 sec  Skin:  No rashes, lesions, or ulcers. Lab/Data Reviewed:  8/26/19  HGB - 9.2  D-Dimer - 3.46  Troponin <0.02  Ck-MB <1.0    2/15/19  TSH - 2.0    Imaging, microbiology, and EKG/Telemetry:  8/26/19  EKG - Right bundle branch block w/ sinus bradycardia  CTA Chest - Normal heart size. No evidence of right heart strain. Aorta is  unremarkable. Right PICC terminates in the superior vena cava. No pericardial  Effusion. No evidence of pulmonary embolism. There is a small hiatal hernia. Assessment/Plan       Chest/back tightness at night and in the morning-   She recently went to the ER (8/26/19) for chest pain and dyspnea, the ER workup was insignificant for any cardiopulmonary disease process. EKG showed a right bundle branch block unchanged from previous EKGs but troponin and CK- MB were WNL. Lab work showed a hemoglobin of 9.2, the patient has a history of anemia for which she is already taking ferrous sulfate. Most likely MSK related or anxiety related  Continue Sertraline 50 mg for anxiety  Obtain a hospital bed for more comfortable sleeping. Memory loss -   Recalled 3/3 objects after 10 minutes  Worrisome to patient and affecting her daily living   May be grief related after loosing her  although patient had negative PHQ-2 screening for depression. May be thyroid related, although patient had TSH WNL on 2/15/19  Start donepezil 5 mg    Chronic pain of right knee, right hip/back-  Patient had bilateral knee replacements for which she attends PT  Xray of RT hip 10/22/18 showed moderate osteoarthric degenerative changes.   Patient received steroid  Injections in her back that only helped for a week  Continue oxycodone and PT    Perforation of right tympanic membrane-  No signs of infection, perforation has healed w/ some retraction of the upper portion of the tympanic membrane. Patient still has decreased hearing and fullness in RT ear  Refer to ENT and continue neomycin/hydrocortisone ear drops. Hair loss-  Combined w/ other symptoms of dry/itch skin, cold intolerance, memory loss, and elevated BP consider thyroid issues.   Last TSH (2/15/19) was WNL  Obtain thyroid panel today to recheck TSH and T4    Anemia-  Continue Ferrous Sulfate over the counter  Consider checking iron, B12, and folate levels if HGB levels continue to be low    Mood-   Denies anhedonia  Negative PHQ-2 screening  Continue Sertraline 50 mg

## 2019-09-20 NOTE — ACP (ADVANCE CARE PLANNING)
Advance Care Planning (ACP) Provider Note - Comprehensive      Date of ACP Conversation: 09/20/2019  Persons included in Conversation:  patient and friend  Length of ACP Conversation in minutes:  <16 minutes (Non-Billable)     Authorized Decision Maker (if patient is incapable of making informed decisions): This person is:  Neyda Silva ACP for ALL Patients with Decision Making Capacity:   Importance of advance care planning, including choosing a healthcare agent to communicate patient's healthcare decisions if patient lost the ability to make decisions, such as after a sudden illness or accident  Understanding of the healthcare agent role was assessed and information provided     Review of Existing Advance Directive:  Does this advance directive still reflect your preferences?   Yes (Provide new form/Refer for assistance in updating)     For Serious or Chronic Illness:  Understanding of medical condition    Understanding of CPR, goals and expected outcomes, benefits and burdens discussed.     Interventions Provided:  Reviewed existing Advance Directive

## 2019-09-23 ENCOUNTER — TELEPHONE (OUTPATIENT)
Dept: FAMILY MEDICINE CLINIC | Age: 69
End: 2019-09-23

## 2019-09-23 NOTE — TELEPHONE ENCOUNTER
Patient called in and ws wanting to know if she could get a nurse to please call back in regards to if she needs to be taking furosemide, potassium chloride, and temazepam. She is just confused on what medications she is and isn't supposed to be taking. Please advise.

## 2019-09-25 NOTE — TELEPHONE ENCOUNTER
Contacted patient and left message to contact the office when available. Awaiting callback.  Will advise patient that she needs to be taking Lasix, Potassium chloride and Temazepam.

## 2019-09-26 NOTE — TELEPHONE ENCOUNTER
Patient called back, informed patient of message above. Patient understood and was pleased.  Encounter closed

## 2019-09-27 NOTE — TELEPHONE ENCOUNTER
Faye Dailey from [facility] stated pt. Was discharged from nursing and pt. Is still having trouble with medication. She is asking if pt. Can get another order for nurse to go to her home a few more times to go over medications with her. Please advise.

## 2019-09-27 NOTE — TELEPHONE ENCOUNTER
Please see message below. Two home health referral is needed. (blank home health referrals please) Please advise. Thank you.

## 2019-09-30 DIAGNOSIS — R41.3 MEMORY DIFFICULTIES: Primary | ICD-10-CM

## 2019-09-30 DIAGNOSIS — Z91.14: Primary | ICD-10-CM

## 2019-09-30 NOTE — TELEPHONE ENCOUNTER
[address] and retrieve fax number. Faxed the patient referral as requested to [phone]. Fax confirmation was received and sent to [facility]. New referral was sent to [facility] care @ [phone] and confirmation received was sent to central scanning.

## 2019-10-02 ENCOUNTER — TELEPHONE (OUTPATIENT)
Dept: FAMILY MEDICINE CLINIC | Age: 69
End: 2019-10-02

## 2019-10-02 NOTE — TELEPHONE ENCOUNTER
Pt. Stated she was supposed to get an order for lymphedema pump for her legs and a hospital bed.  Please assist.

## 2019-10-02 NOTE — TELEPHONE ENCOUNTER
Supply Order for rhe lymphedema pump was generated and awaiting progress notes to be updated for DME company. The chart must state:   Diagnosis: Lymphedema - (I89.0) or CVI - (I87.2)  Symptoms: Stage 1, 2 or 3 Lymphedema  Conservative treatments failed: Elevation and exercise or class 1 compression socks  \"Symptoms persist\"    Please include in updated note in order for patient to receive pump.  Thank you

## 2019-10-04 NOTE — TELEPHONE ENCOUNTER
Patient is requesting to speak to Dr. Leigh Angelucci regarding this issue. She stated no one has contacted her in two weeks about this and she needs to know the status. Did inform her about message below but she stated she needs to personally talk to Dr. Peg Koch before she goes somewhere else. Please advise, thank you.

## 2019-10-07 ENCOUNTER — TELEPHONE (OUTPATIENT)
Dept: FAMILY MEDICINE CLINIC | Age: 69
End: 2019-10-07

## 2019-10-08 NOTE — TELEPHONE ENCOUNTER
Patient called in and informed me that she has been waiting on Dr. Cristine Rodriguez to call her back in regards to the lymphoedema pump and the hospital bed and she is getting frustrated that she has not heard anything from him. I informed her that the nurse had forwarded the message to Dr. Cristine Rodriguez so we were just waiting on him. Please advise.

## 2019-10-08 NOTE — TELEPHONE ENCOUNTER
Windy Benton and was advised that the patient HR has improved and increased. Patient is not experiencing any weakness, lightheadedness and dizziness. Neeraj wanted to notify office. Please advise. Thank you.

## 2019-10-09 NOTE — TELEPHONE ENCOUNTER
Updated progress note and supply order for medical solutions supplier (Lymphedema pump) were faxed to [phone] and confirmation received was sent to central scanning.

## 2019-10-11 NOTE — TELEPHONE ENCOUNTER
Spoke with patient, advised that [facility] signed and faxed to [phone] Ashleigh Parikh, [phone] F, Faxed bed order to [facility] [phone]. This encounter will be closed.

## 2019-10-11 NOTE — TELEPHONE ENCOUNTER
[facility]. Please advise.

## 2019-11-04 ENCOUNTER — OFFICE VISIT (OUTPATIENT)
Dept: FAMILY MEDICINE CLINIC | Age: 69
End: 2019-11-04

## 2019-11-04 VITALS
SYSTOLIC BLOOD PRESSURE: 116 MMHG | WEIGHT: 230 LBS | HEIGHT: 60 IN | HEART RATE: 58 BPM | BODY MASS INDEX: 45.16 KG/M2 | RESPIRATION RATE: 18 BRPM | DIASTOLIC BLOOD PRESSURE: 50 MMHG | OXYGEN SATURATION: 99 % | TEMPERATURE: 98.9 F

## 2019-11-04 DIAGNOSIS — N39.0 ACUTE UTI: Primary | ICD-10-CM

## 2019-11-04 DIAGNOSIS — I10 HTN (HYPERTENSION), BENIGN: ICD-10-CM

## 2019-11-04 DIAGNOSIS — G47.00 INSOMNIA, UNSPECIFIED TYPE: ICD-10-CM

## 2019-11-04 PROBLEM — L03.90 CELLULITIS: Status: RESOLVED | Noted: 2019-07-26 | Resolved: 2019-11-04

## 2019-11-04 PROBLEM — M00.9 SEPTIC JOINT OF RIGHT KNEE JOINT (HCC): Status: RESOLVED | Noted: 2019-07-28 | Resolved: 2019-11-04

## 2019-11-04 LAB
BILIRUB UR QL STRIP: NEGATIVE
GLUCOSE UR-MCNC: NEGATIVE MG/DL
KETONES P FAST UR STRIP-MCNC: NEGATIVE MG/DL
PH UR STRIP: 6 [PH] (ref 4.6–8)
PROT UR QL STRIP: NORMAL
SP GR UR STRIP: 1.03 (ref 1–1.03)
UA UROBILINOGEN AMB POC: NORMAL (ref 0.2–1)
URINALYSIS CLARITY POC: NORMAL
URINALYSIS COLOR POC: YELLOW
URINE BLOOD POC: NORMAL
URINE LEUKOCYTES POC: NORMAL
URINE NITRITES POC: NEGATIVE

## 2019-11-04 RX ORDER — CEPHALEXIN 500 MG/1
500 CAPSULE ORAL 2 TIMES DAILY
COMMUNITY
End: 2020-11-25

## 2019-11-04 RX ORDER — ACETAMINOPHEN, DIPHENHYDRAMINE HCL, PHENYLEPHRINE HCL 325; 25; 5 MG/1; MG/1; MG/1
3 TABLET ORAL
Qty: 90 TAB | Refills: 1 | Status: SHIPPED | OUTPATIENT
Start: 2019-11-04 | End: 2019-11-08 | Stop reason: SDUPTHER

## 2019-11-04 RX ORDER — FUROSEMIDE 40 MG/1
40 TABLET ORAL DAILY
Qty: 90 TAB | Refills: 1 | Status: SHIPPED | OUTPATIENT
Start: 2019-11-04 | End: 2020-04-29

## 2019-11-04 NOTE — PROGRESS NOTES
Hitesh Randhawa is a 71 y.o. female who presents today for follow up      1. Have you been to the ER, urgent care clinic since your last visit? Hospitalized since your last visit? no    2. Have you seen or consulted any other health care providers outside of the [address] [place] [address] since your last visit? Include any pap smears or colon screening. no     Health Maintenance reviewed.     Health Maintenance Due   Topic Date Due    Shingrix Vaccine Age 49> (1 of 2) 01/31/2000    GLAUCOMA SCREENING Q2Y  07/09/2017

## 2019-11-04 NOTE — PROGRESS NOTES
History of Present Illness  Sosa Richey is a 71 y.o. female who presents today for management of    Chief Complaint   Patient presents with    Urinary Frequency       Patient had right knee replacement by Dr. Caitlin Pedroza since her last office visit with me. It was complicated by an infection and she had to go through another surgery for revision. She takes cephalexin daily. Urinary Tract Infection  Patient complains of dysuria, frequency. Onset was 2 days ago, unchanged since that time. Patient denies back pain, congestion, cough and fever. There is not any concern of sexual abuse. There is not a history of trauma to the genital area. Patient does not have a history of recurrent UTI. Patient does not have a history of pyelonephritis.       Problem List  Patient Active Problem List    Diagnosis Date Noted    Popliteal cyst, right 07/26/2019    Dyslipidemia 05/16/2019    Herniated nucleus pulposus, lumbar 05/16/2019    Lymphedema of both lower extremities 08/27/2018    Osteoporosis 05/01/2017    Gastroesophageal reflux disease without esophagitis 10/26/2016    Advance care planning 10/03/2016    HTN (hypertension), benign 04/19/2016    Stress incontinence of urine 11/24/2015    Cataract 11/19/2015    Morbid obesity ([address] 11/26/2014    Depression 11/26/2014    Impaired hearing 11/26/2014       Past Medical History  Past Medical History:   Diagnosis Date    Degenerative disk disease     Depression     Depression     Diffuse idiopathic skeletal hyperostosis     Fracture of leg     Gallstone     Lymphedema of leg 1975    bilateral lower legs    Morbid obesity ([address]     Pulmonary arterial hypertension ([address]     MANPREET (stress urinary incontinence, female)         Surgical History  Past Surgical History:   Procedure Laterality Date    HX HYSTERECTOMY      HX KNEE REPLACEMENT      HX ORTHOPAEDIC      right shoulder and right knee replaced x 2, left knee replaced as well        Current Medications  Current Outpatient Medications   Medication Sig    furosemide (LASIX) 40 mg tablet Take 1 Tab by mouth daily.  melatonin 10 mg tab Take 3 mg by mouth nightly.  cephALEXin (KEFLEX) 500 mg capsule Take 500 mg by mouth two (2) times a day.  ferrous sulfate (IRON) 325 mg (65 mg iron) cpER Take  by mouth.  atorvastatin (LIPITOR) 10 mg tablet Take 1 Tab by mouth daily.  donepezil (ARICEPT) 5 mg tablet Take 1 Tab by mouth nightly.  oxybutynin (DITROPAN) 5 mg tablet Take 1 Tab by mouth three (3) times daily.  losartan (COZAAR) 25 mg tablet TAKE 1 TABLET BY MOUTH ONCE DAILY    sertraline (ZOLOFT) 50 mg tablet Take 1 Tab by mouth daily.  ibuprofen (MOTRIN) 600 mg tablet TAKE 1 TABLET BY MOUTH EVERY 8 HOURS AS NEEDED FOR PAIN    alendronate (FOSAMAX) 70 mg tablet Take 1 Tab by mouth every seven (7) days. No current facility-administered medications for this visit.         Allergies/Drug Reactions  Allergies   Allergen Reactions    Adhesive Other (comments)     Skin burns - red spots    Lisinopril Other (comments)    Nitrofurantoin Other (comments)        Family History  Family History   Problem Relation Age of Onset    Heart Disease Father     Diabetes Father         Social History  Social History     Socioeconomic History    Marital status:      Spouse name: Not on file    Number of children: Not on file    Years of education: Not on file    Highest education level: Not on file   Occupational History    Not on file   Social Needs    Financial resource strain: Not on file    Food insecurity:     Worry: Not on file     Inability: Not on file    Transportation needs:     Medical: Not on file     Non-medical: Not on file   Tobacco Use    Smoking status: Never Smoker    Smokeless tobacco: Never Used   Substance and Sexual Activity    Alcohol use: No     Alcohol/week: 0.0 standard drinks    Drug use: No    Sexual activity: Not on file   Lifestyle    Physical activity:     Days per week: Not on file     Minutes per session: Not on file    Stress: Not on file   Relationships    Social connections:     Talks on phone: Not on file     Gets together: Not on file     Attends Shinto service: Not on file     Active member of club or organization: Not on file     Attends meetings of clubs or organizations: Not on file     Relationship status: Not on file    Intimate partner violence:     Fear of current or ex partner: Not on file     Emotionally abused: Not on file     Physically abused: Not on file     Forced sexual activity: Not on file   Other Topics Concern    Not on file   Social History Narrative    Not on file       Review of Systems  Negative except as mentioned in HPI      Physical Exam  Vital signs:   Vitals:    11/04/19 1337   BP: 116/50   Pulse: (!) 58   Resp: 18   Temp: 98.9 °F (37.2 °C)   TempSrc: Oral   SpO2: 99%   Weight: 230 lb (104.3 kg)   Height: 5' (1.524 m)       General: alert, oriented, not in distress  Eyes: clear conjunctivae, anicteric sclerae, full and equal ROMs  Chest/Lungs: clear breath sounds, no wheezing or crackles  Heart: normal rate, regular rhythm, no murmur  Abdomen: soft, non-distended, non-tender, normal bowel sounds, no organomegaly, no masses  Extremities: no focal deformities, no edema  Neuro: AAOx3, CN's grossly intact  Skin: no visible abnormalities    Laboratory/Tests:  Results for orders placed or performed in visit on 02/15/19   AMB POC URINALYSIS DIP STICK AUTO W/ MICRO     Status: None   Result Value Ref Range Status    Color (UA POC) Yellow  Final    Clarity (UA POC) Clear  Final    Glucose (UA POC) Negative Negative Final    Bilirubin (UA POC) Negative Negative Final    Ketones (UA POC) Negative Negative Final    Specific gravity (UA POC) 1.015 1.001 - 1.035 Final    Blood (UA POC) Negative Negative Final    pH (UA POC) 6.5 4.6 - 8.0 Final    Protein (UA POC) Negative Negative Final    Urobilinogen (UA POC) 0.2 mg/dL 0.2 - 1 Final    Nitrites (UA POC) Negative Negative Final    Leukocyte esterase (UA POC) Trace Negative Final   Results for orders placed or performed in visit on 06/27/18   AMB POC URINALYSIS DIP STICK AUTO W/O MICRO     Status: None   Result Value Ref Range Status    Color (UA POC) Dark Yellow  Final    Clarity (UA POC) Cloudy  Final    Glucose (UA POC) Negative Negative Final    Bilirubin (UA POC) 1+ Negative Final    Ketones (UA POC) Negative Negative Final    Specific gravity (UA POC) 1.030 1.001 - 1.035 Final    Blood (UA POC) Trace Negative Final    pH (UA POC) 5.5 4.6 - 8.0 Final    Protein (UA POC) 1+ Negative Final    Urobilinogen (UA POC) 1 mg/dL 0.2 - 1 Final    Nitrites (UA POC) Positive Negative Final    Leukocyte esterase (UA POC) 2+ Negative Final   Results for orders placed or performed in visit on 11/26/14   AMB POC URINALYSIS DIP STICK MANUAL W/O MICRO     Status: None   Result Value Ref Range Status    Color (UA POC) Light Yellow  Final    Clarity (UA POC) Clear  Final    Glucose (UA POC) Negative Negative Final    Bilirubin (UA POC) Negative Negative Final    Ketones (UA POC) Negative Negative Final    Specific gravity (UA POC) 1.030 1.001 - 1.035 Final    Blood (UA POC) 1+ Negative Final    pH (UA POC) 5.5 4.6 - 8.0 Final    Protein (UA POC) 1+ Negative mg/dL Final    Urobilinogen (UA POC) 0.2 mg/dL 0.2 - 1 Final    Nitrites (UA POC) Negative Negative Final    Leukocyte esterase (UA POC) 3+ Negative Final       Assessment/Plan:    1. Acute UTI  - CULTURE, URINE; Future  - AMB POC URINALYSIS DIP STICK AUTO W/O MICRO  - on cephalexin for chronic antibiotic suppression due to recent prosthetic infection    2. HTN (hypertension), benign  - controlled  - furosemide (LASIX) 40 mg tablet; Take 1 Tab by mouth daily. Dispense: 90 Tab; Refill: 1    3. Insomnia, unspecified type  - increase melatonin 10 mg tab; Take 3 mg by mouth nightly. Dispense: 90 Tab;  Refill: 1        Follow-up and Dispositions · Return in about 2 weeks (around 11/18/2019) for EOV. I have discussed the diagnosis with the patient and the intended plan as seen in the above orders. The patient has received an after-visit summary and questions were answered concerning future plans. I have discussed medication side effects and warnings with the patient as well. I have reviewed the plan of care with the patient, accepted their input and they are in agreement with the treatment goals.        Chasity Frazier MD  November 4, 2019

## 2019-11-08 ENCOUNTER — TELEPHONE (OUTPATIENT)
Dept: FAMILY MEDICINE CLINIC | Age: 69
End: 2019-11-08

## 2019-11-08 DIAGNOSIS — G47.00 INSOMNIA, UNSPECIFIED TYPE: Primary | ICD-10-CM

## 2019-11-08 RX ORDER — LANOLIN ALCOHOL/MO/W.PET/CERES
3 CREAM (GRAM) TOPICAL
Qty: 90 TAB | Refills: 1 | Status: SHIPPED | OUTPATIENT
Start: 2019-11-08 | End: 2020-01-06

## 2019-11-08 NOTE — TELEPHONE ENCOUNTER
Received fax from [facility], please clarify order. melatonin 10 mg tab [[id number]]     Order Details   Dose: 3 mg        Should pt take 3 mg or 10 mg. Please advise.

## 2019-12-04 ENCOUNTER — OFFICE VISIT (OUTPATIENT)
Dept: FAMILY MEDICINE CLINIC | Age: 69
End: 2019-12-04

## 2019-12-04 VITALS
WEIGHT: 216 LBS | HEIGHT: 60 IN | TEMPERATURE: 97.7 F | HEART RATE: 62 BPM | SYSTOLIC BLOOD PRESSURE: 108 MMHG | BODY MASS INDEX: 42.41 KG/M2 | RESPIRATION RATE: 17 BRPM | OXYGEN SATURATION: 99 % | DIASTOLIC BLOOD PRESSURE: 52 MMHG

## 2019-12-04 DIAGNOSIS — M81.0 AGE-RELATED OSTEOPOROSIS WITHOUT CURRENT PATHOLOGICAL FRACTURE: ICD-10-CM

## 2019-12-04 DIAGNOSIS — I10 HTN (HYPERTENSION), BENIGN: Primary | ICD-10-CM

## 2019-12-04 DIAGNOSIS — K21.9 GASTROESOPHAGEAL REFLUX DISEASE WITHOUT ESOPHAGITIS: ICD-10-CM

## 2019-12-04 DIAGNOSIS — R20.0 NUMBNESS AND TINGLING OF LEFT UPPER EXTREMITY: ICD-10-CM

## 2019-12-04 DIAGNOSIS — F41.8 DEPRESSION WITH ANXIETY: ICD-10-CM

## 2019-12-04 DIAGNOSIS — R30.0 DYSURIA: ICD-10-CM

## 2019-12-04 DIAGNOSIS — I89.0 LYMPHEDEMA OF BOTH LOWER EXTREMITIES: ICD-10-CM

## 2019-12-04 DIAGNOSIS — M51.26 HERNIATED NUCLEUS PULPOSUS, LUMBAR: ICD-10-CM

## 2019-12-04 DIAGNOSIS — E78.5 DYSLIPIDEMIA: ICD-10-CM

## 2019-12-04 DIAGNOSIS — R20.2 NUMBNESS AND TINGLING OF LEFT UPPER EXTREMITY: ICD-10-CM

## 2019-12-04 DIAGNOSIS — T84.50XS JOINT PROSTHESIS INFECTION OR INFLAMMATION, SEQUELA: ICD-10-CM

## 2019-12-04 DIAGNOSIS — R41.3 MEMORY DIFFICULTIES: ICD-10-CM

## 2019-12-04 RX ORDER — IBUPROFEN 600 MG/1
TABLET ORAL
Qty: 30 TAB | Refills: 2 | Status: SHIPPED | OUTPATIENT
Start: 2019-12-04 | End: 2020-02-18

## 2019-12-04 RX ORDER — DONEPEZIL HYDROCHLORIDE 5 MG/1
5 TABLET, FILM COATED ORAL
Qty: 90 TAB | Refills: 1 | Status: SHIPPED | OUTPATIENT
Start: 2019-12-04 | End: 2020-05-19

## 2019-12-04 RX ORDER — ALENDRONATE SODIUM 70 MG/1
70 TABLET ORAL
Qty: 12 TAB | Refills: 1 | Status: SHIPPED | OUTPATIENT
Start: 2019-12-04 | End: 2020-02-18

## 2019-12-04 NOTE — PROGRESS NOTES
Yousuf Castle is a 71 y.o. female who presents today for lower back pain, anx,      1. Have you been to the ER, urgent care clinic since your last visit? Hospitalized since your last visit? no    2. Have you seen or consulted any other health care providers outside of the [address] [place] [address] since your last visit? Include any pap smears or colon screening. no     Health Maintenance reviewed.     Health Maintenance Due   Topic Date Due    Shingrix Vaccine Age 49> (1 of 2) 01/31/2000    GLAUCOMA SCREENING Q2Y  07/09/2017

## 2019-12-04 NOTE — PROGRESS NOTES
History of Present Illness  Sosa Gonzales is a 71 y.o. female who presents today for management of    Chief Complaint   Patient presents with    Back Pain       Osteoarthritis and Chronic Pain:  Patient has osteoarthritis and spondylosis, primarily affecting the back and knees. Symptoms onset: problem is longstanding. Rheumatological ROS: ongoing significant pain in back, hips and knees which is stable and controlled by PRN meds. Response to treatment plan: waxing and waning. Patient also complains of right arm and right leg weakness. Onset was 3 weeks ago, not gradually worsening. Patient also reports of left arm and hand numbness upon awakening. Onset was 2 weeks ago. It resolves after movement. No associated neck pain. 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 She currently takes cephalexin for chronic suppression. She is being followed by infectious disease. Cardiovascular Review:  The patient has hypertension and hyperlipidemia. Diet and Lifestyle: generally follows a low fat low cholesterol diet, generally follows a low sodium diet, sedentary, nonsmoker  Home BP Monitoring: is not measured at home. Pertinent ROS: taking medications as instructed, no medication side effects noted, no TIA's, no chest pain on exertion, no dyspnea on exertion, no swelling of ankles. Depression/Anxiety Review:  Patient is seen for depression and anxiety. Current treatment includes SSRI and no other therapies. Ongoing symptoms include: none. Patient denies: palpitations, sweating, chest pain, shortness of breath, dizziness. Reported side effects from the treatment: none.     Problem List  Patient Active Problem List    Diagnosis Date Noted    Popliteal cyst, right 07/26/2019    Dyslipidemia 05/16/2019    Herniated nucleus pulposus, lumbar 05/16/2019    Lymphedema of both lower extremities 08/27/2018    Osteoporosis 05/01/2017    Gastroesophageal reflux disease without esophagitis 10/26/2016    Advance care planning 10/03/2016    HTN (hypertension), benign 04/19/2016    Stress incontinence of urine 11/24/2015    Cataract 11/19/2015    Morbid obesity ([address] 11/26/2014    Depression with anxiety 11/26/2014    Impaired hearing 11/26/2014       Past Medical History  Past Medical History:   Diagnosis Date    Degenerative disk disease     Depression     Depression     Diffuse idiopathic skeletal hyperostosis     Fracture of leg     Gallstone     Infected prosthetic knee joint ([address] 07/2019    Lymphedema of leg 1975    bilateral lower legs    Morbid obesity ([address]     Pulmonary arterial hypertension ([address]     MANPREET (stress urinary incontinence, female)         Surgical History  Past Surgical History:   Procedure Laterality Date    HX HYSTERECTOMY      HX KNEE REPLACEMENT Right 2007    HX ORTHOPAEDIC      right shoulder and right knee replaced x 2, left knee replaced as well        Current Medications  Current Outpatient Medications   Medication Sig    ibuprofen (MOTRIN) 600 mg tablet TAKE 1 TABLET BY MOUTH EVERY 8 HOURS AS NEEDED FOR PAIN    donepezil (ARICEPT) 5 mg tablet Take 1 Tab by mouth nightly.  alendronate (FOSAMAX) 70 mg tablet Take 1 Tab by mouth every seven (7) days.  melatonin 3 mg tablet Take 1 Tab by mouth nightly.  furosemide (LASIX) 40 mg tablet Take 1 Tab by mouth daily.  cephALEXin (KEFLEX) 500 mg capsule Take 500 mg by mouth two (2) times a day.  ferrous sulfate (IRON) 325 mg (65 mg iron) cpER Take  by mouth.  atorvastatin (LIPITOR) 10 mg tablet Take 1 Tab by mouth daily.  oxybutynin (DITROPAN) 5 mg tablet Take 1 Tab by mouth three (3) times daily.  losartan (COZAAR) 25 mg tablet TAKE 1 TABLET BY MOUTH ONCE DAILY    sertraline (ZOLOFT) 50 mg tablet Take 1 Tab by mouth daily. No current facility-administered medications for this visit. Allergies/Drug Reactions  Allergies   Allergen Reactions    Adhesive Other (comments)     Skin burns - red spots    Lisinopril Other (comments)    Nitrofurantoin Other (comments)        Family History  Family History   Problem Relation Age of Onset    Heart Disease Father     Diabetes Father         Social History  Social History     Socioeconomic History    Marital status:      Spouse name: Not on file    Number of children: Not on file    Years of education: Not on file    Highest education level: Not on file   Occupational History    Not on file   Social Needs    Financial resource strain: Not on file    Food insecurity:     Worry: Not on file     Inability: Not on file    Transportation needs:     Medical: Not on file     Non-medical: Not on file   Tobacco Use    Smoking status: Never Smoker    Smokeless tobacco: Never Used   Substance and Sexual Activity    Alcohol use: No     Alcohol/week: 0.0 standard drinks    Drug use: No    Sexual activity: Not on file   Lifestyle    Physical activity:     Days per week: Not on file     Minutes per session: Not on file    Stress: Not on file   Relationships    Social connections:     Talks on phone: Not on file     Gets together: Not on file     Attends Orthodox service: Not on file     Active member of club or organization: Not on file     Attends meetings of clubs or organizations: Not on file     Relationship status: Not on file    Intimate partner violence:     Fear of current or ex partner: Not on file     Emotionally abused: Not on file     Physically abused: Not on file     Forced sexual activity: Not on file   Other Topics Concern    Not on file   Social History Narrative    Not on file       Review of Systems  General ROS: negative for - chills, fatigue or fever  Psychological ROS: negative for - anxiety or depression  Ophthalmic ROS: positive for - uses glasses  Respiratory ROS: no cough, shortness of breath, or wheezing  Cardiovascular ROS: no chest pain or dyspnea on exertion  Genito-Urinary ROS: positive for - incontinence  Musculoskeletal ROS: positive for - joint pain and pain in back - lower  negative for - joint swelling  Neurological ROS: positive for - gait disturbance, impaired coordination/balance, numbness/tingling and weakness  negative for - confusion or headaches      Physical Exam  Vital signs:   Vitals:    12/04/19 1129   BP: 108/52   Pulse: 62   Resp: 17   Temp: 97.7 °F (36.5 °C)   TempSrc: Oral   SpO2: 99%   Weight: 216 lb (98 kg)   Height: 5' (1.524 m)       General: alert, oriented, not in distress, ambulates with a walker  Eyes: clear conjunctivae, anicteric sclerae, full and equal ROMs  Chest/Lungs: clear breath sounds, no wheezing or crackles  Heart: normal rate, regular rhythm, no murmur  Extremities: no focal deformities, no edema  Neuro: AAOx3, CN's grossly intact  Skin: no visible abnormalities    Laboratory/Tests:  Component      Latest Ref Rng & Units 9/20/2019 8/26/2019 8/26/2019 8/26/2019           9:57 AM  3:04 PM  3:04 PM  3:04 PM   WBC      4.6 - 13.2 K/uL    3.8 (L)   RBC      4.20 - 5.30 M/uL    3.39 (L)   HGB      12.0 - 16.0 g/dL    9.2 (L)   HCT      35.0 - 45.0 %    29.1 (L)   MCV      74.0 - 97.0 FL    85.8   MCH      24.0 - 34.0 PG    27.1   MCHC      31.0 - 37.0 g/dL    31.6   RDW      11.6 - 14.5 %    18.4 (H)   PLATELET      773 - 459 K/uL    207   MPV      9.2 - 11.8 FL    9.9   NEUTROPHILS      40 - 73 %    58   LYMPHOCYTES      21 - 52 %    25   MONOCYTES      3 - 10 %    13 (H)   EOSINOPHILS      0 - 5 %    3   BASOPHILS      0 - 2 %    1   ABS. NEUTROPHILS      1.8 - 8.0 K/UL    2.2   ABS. LYMPHOCYTES      0.9 - 3.6 K/UL    0.9   ABS. MONOCYTES      0.05 - 1.2 K/UL    0.5   ABS. EOSINOPHILS      0.0 - 0.4 K/UL    0.1   ABS.  BASOPHILS      0.0 - 0.1 K/UL    0.0   DF          AUTOMATED   Sodium      136 - 145 mmol/L   142    Potassium      3.5 - 5.5 mmol/L   3.5    Chloride 100 - 111 mmol/L   110    CO2      21 - 32 mmol/L   25    Anion gap      3.0 - 18 mmol/L   7    Glucose      74 - 99 mg/dL   77    BUN      7.0 - 18 MG/DL   10    Creatinine      0.6 - 1.3 MG/DL   0.70    BUN/Creatinine ratio      12 - 20     14    GFR est AA      >60 ml/min/1.73m2   >60    GFR est non-AA      >60 ml/min/1.73m2   >60    Calcium      8.5 - 10.1 MG/DL   9.0    Magnesium      1.6 - 2.6 mg/dL  2.1     TSH      0.36 - 3.74 uIU/mL 2.67      CBC (11/12/2019 2:32 PM EST)  CBC (11/12/2019 2:32 PM EST)   Component Value Ref Range Performed At Pathologist Signature   WBC x 10*3 4.9 4.0 - 11.0 K/uL [facility]     RBC x 10^6 3.96 3.80 - 5.20 M/uL [facility]     HGB 11.0 (L) 11.7 - 16.1 g/dL [facility]     HCT 34.5 (L) 35.1 - 48.3 % [facility]     MCV 87 80 - 95 fL [facility]     MCH 28 26 - 34 pg [facility]     MCHC 32 31 - 36 g/dL [facility]     RDW 14.3 10.0 - 15.5 % [facility]     Platelet 543 787 - 621 K/uL [facility]     MPV 10.9 9.0 - 13.0 fL [facility]     C REACTIVE PROTEIN (11/12/2019 2:32 PM EST)  C REACTIVE PROTEIN (11/12/2019 2:32 PM EST)   Component Value Ref Range Performed At Pathologist Signature   C-REACTIVE PROTEIN 0.3 0.0 - 0.5 mg/dL [email or web address]     SEDIMENTATION RATE (11/12/2019 2:32 PM EST)  SEDIMENTATION RATE (11/12/2019 2:32 PM EST)   Component Value Ref Range Performed At Pathologist Signature   Sedimentation Rate 31 (H) 0 - 30 mm/hr [facility]         Assessment/Plan:    1. HTN (hypertension), benign  - controlled    2. Dyslipidemia  - controlled     3. Age-related osteoporosis without current pathological fracture  - restart alendronate  - start calcium and vitamin D    4. Lymphedema of both lower extremities  - Symptoms persisted despite conservative therapies: elevation, exercise, compression stockings class 1    5.  Depression with anxiety  - stable    6. Gastroesophageal reflux disease without esophagitis  - resolved    7. Joint prosthesis infection or inflammation, sequela  - on cephalexin for chronic suprression  - ID follow-up    8. Herniated nucleus pulposus, lumbar  - NSAID or Tylenol as needed    9. Memory difficulties  - continue donepezil    10. Numbness and tingling of left upper extremity  - monitor  - patient declined work-up at this time    11. Dysuria  - check urinalysis and urine culture  - Patient already on cephalexin    Follow-up and Dispositions    · Return in about 2 months (around 2/4/2020) for ROV. I have discussed the diagnosis with the patient and the intended plan as seen in the above orders. The patient has received an after-visit summary and questions were answered concerning future plans. I have discussed medication side effects and warnings with the patient as well. I have reviewed the plan of care with the patient, accepted their input and they are in agreement with the treatment goals.        Silviano Orlando MD  December 4, 2019

## 2019-12-05 ENCOUNTER — TELEPHONE (OUTPATIENT)
Dept: FAMILY MEDICINE CLINIC | Age: 69
End: 2019-12-05

## 2019-12-05 NOTE — TELEPHONE ENCOUNTER
Nurse checked the status of order, called [employer]. Representative will call back.

## 2019-12-05 NOTE — TELEPHONE ENCOUNTER
Dr. Dori Zapata sent an order request for the lymphedema pump. Please see media file on 10/17/19. Please follow-up. Thank you.

## 2019-12-05 NOTE — TELEPHONE ENCOUNTER
"I have pain by my pacemaker" Pt called in and was still requesting the lymphedema pump that she was supposed to get 2-3 months ago. She said she had forgotten to discuss it with Dr. Mala Rocha when she was in here yesterday. Please advise.

## 2019-12-09 NOTE — TELEPHONE ENCOUNTER
[phone], Nurse sent additional information, notes to [employer]. Nurse spoke to patient, and patient stated Dr. Rey Vargas was not on [facility] list. At this time patient is aware of lymphedema pump. This encounter will be closed.

## 2019-12-11 ENCOUNTER — TELEPHONE (OUTPATIENT)
Dept: FAMILY MEDICINE CLINIC | Age: 69
End: 2019-12-11

## 2019-12-11 NOTE — TELEPHONE ENCOUNTER
Patient stated she is still having pain. She will like a call back to discuss issue. Patient also will like to discuss her lab results. Please advise.

## 2019-12-12 NOTE — TELEPHONE ENCOUNTER
Nurse spoke to patient and explained that test was not completed. Pt upset and started to raise voice stating that every time she call she can get a hold of noone, that noone called her concerning the leg pump and that we lost her sample twice. Nurse attempted to advise patient that office called back concerning pump and that office is unaware that sample was left. Pt transferred to  due to pt becoming upset with issues.

## 2019-12-16 ENCOUNTER — HOSPITAL ENCOUNTER (OUTPATIENT)
Dept: LAB | Age: 69
Discharge: HOME | End: 2019-12-16
Payer: MEDICARE

## 2019-12-16 ENCOUNTER — OFFICE VISIT (OUTPATIENT)
Dept: FAMILY MEDICINE CLINIC | Age: 69
End: 2019-12-16

## 2019-12-16 VITALS
WEIGHT: 215 LBS | SYSTOLIC BLOOD PRESSURE: 122 MMHG | TEMPERATURE: 97.4 F | HEIGHT: 60 IN | HEART RATE: 62 BPM | DIASTOLIC BLOOD PRESSURE: 54 MMHG | OXYGEN SATURATION: 99 % | BODY MASS INDEX: 42.21 KG/M2 | RESPIRATION RATE: 18 BRPM

## 2019-12-16 DIAGNOSIS — N39.0 ACUTE UTI: Primary | ICD-10-CM

## 2019-12-16 DIAGNOSIS — N39.0 ACUTE UTI: ICD-10-CM

## 2019-12-16 LAB
BILIRUB UR QL STRIP: NEGATIVE
GLUCOSE UR-MCNC: NEGATIVE MG/DL
KETONES P FAST UR STRIP-MCNC: NEGATIVE MG/DL
PH UR STRIP: 6 [PH] (ref 4.6–8)
PROT UR QL STRIP: NORMAL
SP GR UR STRIP: 1.02 (ref 1–1.03)
UA UROBILINOGEN AMB POC: NORMAL (ref 0.2–1)
URINALYSIS CLARITY POC: NORMAL
URINALYSIS COLOR POC: YELLOW
URINE BLOOD POC: NORMAL
URINE LEUKOCYTES POC: NORMAL
URINE NITRITES POC: POSITIVE

## 2019-12-16 PROCEDURE — 87186 SC STD MICRODIL/AGAR DIL: CPT

## 2019-12-16 PROCEDURE — 87086 URINE CULTURE/COLONY COUNT: CPT

## 2019-12-16 PROCEDURE — 87077 CULTURE AEROBIC IDENTIFY: CPT

## 2019-12-16 RX ORDER — CIPROFLOXACIN 250 MG/1
250 TABLET, FILM COATED ORAL 2 TIMES DAILY
Qty: 10 TAB | Refills: 0 | Status: SHIPPED | OUTPATIENT
Start: 2019-12-16 | End: 2019-12-21

## 2019-12-16 NOTE — PROGRESS NOTES
History of Present Illness  Sosa Romano is a 71 y.o. female who presents today for management of    Chief Complaint   Patient presents with    Bladder Infection       Urinary Tract Infection  Patient complains of dysuria. Onset was a few weeks ago, unchanged since that time. Patient denies fever, stomach ache and vaginal discharge. There is not any concern of sexual abuse. There is not a history of trauma to the genital area. Patient does not have a history of recurrent UTI. Patient does not have a history of pyelonephritis.         Problem List  Patient Active Problem List    Diagnosis Date Noted    Popliteal cyst, right 07/26/2019    Dyslipidemia 05/16/2019    Herniated nucleus pulposus, lumbar 05/16/2019    Lymphedema of both lower extremities 08/27/2018    Osteoporosis 05/01/2017    Gastroesophageal reflux disease without esophagitis 10/26/2016    Advance care planning 10/03/2016    HTN (hypertension), benign 04/19/2016    Stress incontinence of urine 11/24/2015    Cataract 11/19/2015    Morbid obesity ([address] 11/26/2014    Depression with anxiety 11/26/2014    Impaired hearing 11/26/2014       Past Medical History  Past Medical History:   Diagnosis Date    Degenerative disk disease     Depression     Depression     Diffuse idiopathic skeletal hyperostosis     Fracture of leg     Gallstone     Infected prosthetic knee joint ([address] 07/2019    Lymphedema of leg 1975    bilateral lower legs    Morbid obesity (HCC)     Pulmonary arterial hypertension ([address]     MANPREET (stress urinary incontinence, female)         Surgical History  Past Surgical History:   Procedure Laterality Date    HX HYSTERECTOMY      HX KNEE REPLACEMENT Right 2007    HX ORTHOPAEDIC      right shoulder and right knee replaced x 2, left knee replaced as well        Current Medications  Current Outpatient Medications   Medication Sig    ciprofloxacin HCl (CIPRO) 250 mg tablet Take 1 Tab by mouth two (2) times a day for 5 days.  ibuprofen (MOTRIN) 600 mg tablet TAKE 1 TABLET BY MOUTH EVERY 8 HOURS AS NEEDED FOR PAIN    donepezil (ARICEPT) 5 mg tablet Take 1 Tab by mouth nightly.  alendronate (FOSAMAX) 70 mg tablet Take 1 Tab by mouth every seven (7) days.  melatonin 3 mg tablet Take 1 Tab by mouth nightly.  furosemide (LASIX) 40 mg tablet Take 1 Tab by mouth daily.  cephALEXin (KEFLEX) 500 mg capsule Take 500 mg by mouth two (2) times a day.  ferrous sulfate (IRON) 325 mg (65 mg iron) cpER Take  by mouth.  atorvastatin (LIPITOR) 10 mg tablet Take 1 Tab by mouth daily.  oxybutynin (DITROPAN) 5 mg tablet Take 1 Tab by mouth three (3) times daily.  losartan (COZAAR) 25 mg tablet TAKE 1 TABLET BY MOUTH ONCE DAILY    sertraline (ZOLOFT) 50 mg tablet Take 1 Tab by mouth daily. No current facility-administered medications for this visit.         Allergies/Drug Reactions  Allergies   Allergen Reactions    Adhesive Other (comments)     Skin burns - red spots    Lisinopril Other (comments)    Nitrofurantoin Other (comments)        Family History  Family History   Problem Relation Age of Onset    Heart Disease Father     Diabetes Father         Social History  Social History     Socioeconomic History    Marital status:      Spouse name: Not on file    Number of children: Not on file    Years of education: Not on file    Highest education level: Not on file   Occupational History    Not on file   Social Needs    Financial resource strain: Not on file    Food insecurity:     Worry: Not on file     Inability: Not on file    Transportation needs:     Medical: Not on file     Non-medical: Not on file   Tobacco Use    Smoking status: Never Smoker    Smokeless tobacco: Never Used   Substance and Sexual Activity    Alcohol use: No     Alcohol/week: 0.0 standard drinks    Drug use: No    Sexual activity: Not on file   Lifestyle    Physical activity:     Days per week: Not on file Minutes per session: Not on file    Stress: Not on file   Relationships    Social connections:     Talks on phone: Not on file     Gets together: Not on file     Attends [employer] service: Not on file     Active member of club or organization: Not on file     Attends meetings of clubs or organizations: Not on file     Relationship status: Not on file    Intimate partner violence:     Fear of current or ex partner: Not on file     Emotionally abused: Not on file     Physically abused: Not on file     Forced sexual activity: Not on file   Other Topics Concern    Not on file   Social History Narrative    Not on file       Review of Systems  Review of Systems   Constitutional: Negative for chills and fever. HENT: Negative. Respiratory: Negative. Cardiovascular: Negative. Gastrointestinal: Negative. Genitourinary: Negative. Musculoskeletal: Negative. Skin: Negative. Neurological: Negative. Physical Exam  Vital signs:   Vitals:    12/16/19 1035   BP: 122/54   Pulse: 62   Resp: 18   Temp: 97.4 °F (36.3 °C)   TempSrc: Oral   SpO2: 99%   Weight: 215 lb (97.5 kg)   Height: 5' (1.524 m)       General: alert, oriented, not in distress  Eyes: clear conjunctivae, anicteric sclerae, full and equal ROMs  Chest/Lungs: clear breath sounds, no wheezing or crackles  Heart: normal rate, regular rhythm, no murmur  Abdomen: soft, non-distended, non-tender, normal bowel sounds, no organomegaly, no masses  Extremities: no focal deformities, no edema  Neuro: AAOx3, CN's grossly intact  Skin: no visible abnormalities    Laboratory/Tests:      Assessment/Plan:      1. Acute UTI  - AMB POC URINALYSIS DIP STICK AUTO W/O MICRO  - CULTURE, URINE; Future  - ciprofloxacin HCl (CIPRO) 250 mg tablet; Take 1 Tab by mouth two (2) times a day for 5 days. Dispense: 10 Tab;  Refill: 0  - advised to start taking probiotics - she will continue cephalexin for chronic suppression from ID due to history of prosthetic joint infection. Follow-up and Dispositions    · Return in about 7 weeks (around 2/4/2020) for ROV. I have discussed the diagnosis with the patient and the intended plan as seen in the above orders. The patient has received an after-visit summary and questions were answered concerning future plans. I have discussed medication side effects and warnings with the patient as well. I have reviewed the plan of care with the patient, accepted their input and they are in agreement with the treatment goals.        Aniceto Banks MD  December 16, 2019

## 2019-12-18 ENCOUNTER — TELEPHONE (OUTPATIENT)
Dept: FAMILY MEDICINE CLINIC | Age: 69
End: 2019-12-18

## 2019-12-18 LAB
BACTERIA SPEC CULT: ABNORMAL
SERVICE CMNT-IMP: ABNORMAL

## 2019-12-18 NOTE — TELEPHONE ENCOUNTER
----- Message from Veda Brown MD sent at 12/18/2019  8:04 AM EST -----  Urine culture grew pseudomonas, susceptible to cipro. Advise patient to complete course of antibiotics.

## 2019-12-18 NOTE — PROGRESS NOTES
Urine culture grew pseudomonas, susceptible to cipro. Advise patient to complete course of antibiotics.

## 2019-12-18 NOTE — TELEPHONE ENCOUNTER
Spoke with patient, informed her about pseudomonas in urine which can be treated with cipro. Patient is feeling better.

## 2019-12-19 ENCOUNTER — TELEPHONE (OUTPATIENT)
Dept: FAMILY MEDICINE CLINIC | Age: 69
End: 2019-12-19

## 2019-12-19 DIAGNOSIS — R19.7 DIARRHEA OF PRESUMED INFECTIOUS ORIGIN: ICD-10-CM

## 2019-12-19 DIAGNOSIS — N39.0 ACUTE UTI: Primary | ICD-10-CM

## 2019-12-19 RX ORDER — CEFUROXIME AXETIL 500 MG/1
500 TABLET ORAL 2 TIMES DAILY
Qty: 10 TAB | Refills: 0 | Status: SHIPPED | OUTPATIENT
Start: 2019-12-19 | End: 2019-12-24

## 2019-12-19 NOTE — TELEPHONE ENCOUNTER
Nurse spoke to patient, patient will stop Cipro and start Ceftin. Pt states if it gets too hard to breath she will seek immediate attention. If watery stools continue, patient will come in to be tested for c. Diff. Pt verbalized understanding.

## 2019-12-19 NOTE — TELEPHONE ENCOUNTER
Patient called and stated that Cipro is giving her watery stools and when she talks it's hard for her to talk and breath. Nurse advised patient not to take the next dosage of cipro and to immediately go to ER. Pt refused and stated she will be fine but she just wanted to know what to do and that she can not afford ER visit. Nurse verbalized understanding. Please advised concerning medication.

## 2019-12-19 NOTE — TELEPHONE ENCOUNTER
Stop Cipro. Changed to cefuroxime 500mg BID x 5 days. If with persistent loose stools, advise to submit a stool sample to check for C.diff.   Thank you

## 2019-12-31 ENCOUNTER — OFFICE VISIT (OUTPATIENT)
Dept: FAMILY MEDICINE CLINIC | Age: 69
End: 2019-12-31

## 2019-12-31 ENCOUNTER — HOSPITAL ENCOUNTER (OUTPATIENT)
Dept: LAB | Age: 69
Discharge: HOME | End: 2019-12-31
Payer: MEDICARE

## 2019-12-31 VITALS
HEART RATE: 56 BPM | TEMPERATURE: 98.6 F | HEIGHT: 60 IN | DIASTOLIC BLOOD PRESSURE: 58 MMHG | WEIGHT: 215 LBS | BODY MASS INDEX: 42.21 KG/M2 | RESPIRATION RATE: 15 BRPM | OXYGEN SATURATION: 100 % | SYSTOLIC BLOOD PRESSURE: 123 MMHG

## 2019-12-31 DIAGNOSIS — N39.0 ACUTE UTI: Primary | ICD-10-CM

## 2019-12-31 DIAGNOSIS — N39.0 ACUTE UTI: ICD-10-CM

## 2019-12-31 LAB
BILIRUB UR QL STRIP: NEGATIVE
GLUCOSE UR-MCNC: NEGATIVE MG/DL
KETONES P FAST UR STRIP-MCNC: NEGATIVE MG/DL
PH UR STRIP: 5.5 [PH] (ref 4.6–8)
PROT UR QL STRIP: NORMAL
SP GR UR STRIP: 1.02 (ref 1–1.03)
UA UROBILINOGEN AMB POC: NORMAL (ref 0.2–1)
URINALYSIS CLARITY POC: NORMAL
URINALYSIS COLOR POC: YELLOW
URINE BLOOD POC: NORMAL
URINE LEUKOCYTES POC: NORMAL
URINE NITRITES POC: POSITIVE

## 2019-12-31 PROCEDURE — 87186 SC STD MICRODIL/AGAR DIL: CPT

## 2019-12-31 PROCEDURE — 87086 URINE CULTURE/COLONY COUNT: CPT

## 2019-12-31 RX ORDER — LEVOFLOXACIN 750 MG/1
750 TABLET ORAL DAILY
Qty: 7 TAB | Refills: 0 | Status: SHIPPED | OUTPATIENT
Start: 2019-12-31 | End: 2020-01-06 | Stop reason: SDUPTHER

## 2019-12-31 NOTE — PROGRESS NOTES
History of Present Illness  Sosa Joshi is a 71 y.o. female who presents today for management of    Chief Complaint   Patient presents with    Urinary Frequency       Urinary Tract Infection  Patient complains of dysuria, frequency, urgency. Onset was 2 weeks ago, unchanged since that time. Patient denies fever, stomach ache and vaginal discharge. Patient was originally prescribed ciprofloxacin but patient stopped due to development of diarrhea which has now resolved. She completed a 5-day course of cefuroxime with no improvement of symptoms.       Problem List  Patient Active Problem List    Diagnosis Date Noted    Popliteal cyst, right 07/26/2019    Dyslipidemia 05/16/2019    Herniated nucleus pulposus, lumbar 05/16/2019    Lymphedema of both lower extremities 08/27/2018    Osteoporosis 05/01/2017    Gastroesophageal reflux disease without esophagitis 10/26/2016    Advance care planning 10/03/2016    HTN (hypertension), benign 04/19/2016    Stress incontinence of urine 11/24/2015    Cataract 11/19/2015    Morbid obesity ([address] 11/26/2014    Depression with anxiety 11/26/2014    Impaired hearing 11/26/2014       Past Medical History  Past Medical History:   Diagnosis Date    Degenerative disk disease     Depression     Depression     Diffuse idiopathic skeletal hyperostosis     Fracture of leg     Gallstone     Infected prosthetic knee joint ([address] 07/2019    Lymphedema of leg 1975    bilateral lower legs    Morbid obesity (HCC)     Pulmonary arterial hypertension ([address]     MANPREET (stress urinary incontinence, female)         Surgical History  Past Surgical History:   Procedure Laterality Date    HX HYSTERECTOMY      HX KNEE REPLACEMENT Right 2007    HX ORTHOPAEDIC      right shoulder and right knee replaced x 2, left knee replaced as well        Current Medications  Current Outpatient Medications   Medication Sig    levoFLOXacin (LEVAQUIN) 750 mg tablet Take 1 Tab by mouth daily for 7 days.  ibuprofen (MOTRIN) 600 mg tablet TAKE 1 TABLET BY MOUTH EVERY 8 HOURS AS NEEDED FOR PAIN    donepezil (ARICEPT) 5 mg tablet Take 1 Tab by mouth nightly.  alendronate (FOSAMAX) 70 mg tablet Take 1 Tab by mouth every seven (7) days.  melatonin 3 mg tablet Take 1 Tab by mouth nightly.  furosemide (LASIX) 40 mg tablet Take 1 Tab by mouth daily.  cephALEXin (KEFLEX) 500 mg capsule Take 500 mg by mouth two (2) times a day.  ferrous sulfate (IRON) 325 mg (65 mg iron) cpER Take  by mouth.  atorvastatin (LIPITOR) 10 mg tablet Take 1 Tab by mouth daily.  oxybutynin (DITROPAN) 5 mg tablet Take 1 Tab by mouth three (3) times daily.  losartan (COZAAR) 25 mg tablet TAKE 1 TABLET BY MOUTH ONCE DAILY    sertraline (ZOLOFT) 50 mg tablet Take 1 Tab by mouth daily. No current facility-administered medications for this visit.         Allergies/Drug Reactions  Allergies   Allergen Reactions    Adhesive Other (comments)     Skin burns - red spots    Ciprofloxacin Diarrhea    Lisinopril Other (comments)    Nitrofurantoin Other (comments)        Family History  Family History   Problem Relation Age of Onset    Heart Disease Father     Diabetes Father         Social History  Social History     Socioeconomic History    Marital status:      Spouse name: Not on file    Number of children: Not on file    Years of education: Not on file    Highest education level: Not on file   Occupational History    Not on file   Social Needs    Financial resource strain: Not on file    Food insecurity:     Worry: Not on file     Inability: Not on file    Transportation needs:     Medical: Not on file     Non-medical: Not on file   Tobacco Use    Smoking status: Never Smoker    Smokeless tobacco: Never Used   Substance and Sexual Activity    Alcohol use: No     Alcohol/week: 0.0 standard drinks    Drug use: No    Sexual activity: Not on file   Lifestyle    Physical activity:     Days per week: Not on file     Minutes per session: Not on file    Stress: Not on file   Relationships    Social connections:     Talks on phone: Not on file     Gets together: Not on file     Attends [employer] service: Not on file     Active member of club or organization: Not on file     Attends meetings of clubs or organizations: Not on file     Relationship status: Not on file    Intimate partner violence:     Fear of current or ex partner: Not on file     Emotionally abused: Not on file     Physically abused: Not on file     Forced sexual activity: Not on file   Other Topics Concern    Not on file   Social History Narrative    Not on file       Review of Systems  Review of Systems   Constitutional: Negative. HENT: Negative. Eyes: Negative. Respiratory: Negative. Cardiovascular: Positive for leg swelling. Genitourinary: Positive for dysuria, frequency and urgency. Musculoskeletal: Negative. Skin: Negative. Neurological: Negative. Physical Exam  Vital signs:   Vitals:    12/31/19 1221   BP: 123/58   Pulse: (!) 56   Resp: 15   Temp: 98.6 °F (37 °C)   TempSrc: Oral   SpO2: 100%   Weight: 215 lb (97.5 kg)   Height: 5' (1.524 m)       General: alert, oriented, not in distress  Abdomen: soft, non-distended, non-tender, normal bowel sounds, no organomegaly, no masses    Laboratory/Tests:  URINE CULTURE 12/16/2019  Special Requests: NO SPECIAL REQUESTS    Final   Culture result: Abnormal     Final   >100,000 COLONIES/mL PSEUDOMONAS AERUGINOSA    Susceptibility      Pseudomonas aeruginosa     GREGORIA    Cefepime ($$) <=1 ug/mL S    Ceftazidime ($) <=1 ug/mL S    Ciprofloxacin ($) <=0.25 ug/mL S    Gentamicin ($) <=1 ug/mL S    Imipenem <=0.25 ug/mL S    Levofloxacin ($) 0.5 ug/mL S    Piperacillin/Tazobac ($) <=4 ug/mL S    Tobramycin ($) <=1 ug/mL S        Assessment/Plan:      ICD-10-CM ICD-9-CM    1.  Acute UTI N39.0 599.0 AMB POC URINALYSIS DIP STICK AUTO W/O MICRO      levoFLOXacin (LEVAQUIN) 750 mg tablet      CULTURE, URINE     Urine culture grew pseudomonas. Patient did not tolerate ciprofloxacin. Will prescribe levofloxacin. Continue probiotics. Repeat urine culture after treatment. Follow-up and Dispositions    · Return if symptoms worsen or fail to improve. I have discussed the diagnosis with the patient and the intended plan as seen in the above orders. The patient has received an after-visit summary and questions were answered concerning future plans. I have discussed medication side effects and warnings with the patient as well. I have reviewed the plan of care with the patient, accepted their input and they are in agreement with the treatment goals.        Mee Hilario MD  December 31, 2019

## 2019-12-31 NOTE — PROGRESS NOTES
Kannan Cuevas is a 71 y.o. female who presents today for urinary frequency, cloudiness     1. Have you been to the ER, urgent care clinic since your last visit? Hospitalized since your last visit? no    2. Have you seen or consulted any other health care providers outside of the [address] [place] [address] since your last visit? Include any pap smears or colon screening. no     Health Maintenance reviewed.     Health Maintenance Due   Topic Date Due    Shingrix Vaccine Age 49> (1 of 2) 01/31/2000    GLAUCOMA SCREENING Q2Y  07/09/2017

## 2020-01-02 LAB
BACTERIA SPEC CULT: ABNORMAL
SERVICE CMNT-IMP: ABNORMAL

## 2020-01-06 ENCOUNTER — OFFICE VISIT (OUTPATIENT)
Dept: FAMILY MEDICINE CLINIC | Age: 70
End: 2020-01-06

## 2020-01-06 VITALS
OXYGEN SATURATION: 98 % | HEIGHT: 60 IN | BODY MASS INDEX: 41.99 KG/M2 | HEART RATE: 66 BPM | TEMPERATURE: 98.2 F | SYSTOLIC BLOOD PRESSURE: 100 MMHG | DIASTOLIC BLOOD PRESSURE: 63 MMHG | RESPIRATION RATE: 18 BRPM

## 2020-01-06 DIAGNOSIS — N39.0 ACUTE UTI: Primary | ICD-10-CM

## 2020-01-06 LAB
BILIRUB UR QL STRIP: NEGATIVE
GLUCOSE UR-MCNC: NEGATIVE MG/DL
KETONES P FAST UR STRIP-MCNC: NEGATIVE MG/DL
PH UR STRIP: 6 [PH] (ref 4.6–8)
PROT UR QL STRIP: ABNORMAL
SP GR UR STRIP: 1.02 (ref 1–1.03)
UA UROBILINOGEN AMB POC: ABNORMAL (ref 0.2–1)
URINALYSIS CLARITY POC: CLEAR
URINALYSIS COLOR POC: YELLOW
URINE BLOOD POC: ABNORMAL
URINE LEUKOCYTES POC: ABNORMAL
URINE NITRITES POC: NEGATIVE

## 2020-01-06 RX ORDER — LEVOFLOXACIN 750 MG/1
750 TABLET ORAL DAILY
Qty: 7 TAB | Refills: 0 | Status: SHIPPED | OUTPATIENT
Start: 2020-01-06 | End: 2020-01-13

## 2020-01-06 NOTE — PROGRESS NOTES
History of Present Illness  Sosa Figueredo is a 71 y.o. female who presents today for management of    Chief Complaint   Patient presents with    Bladder Infection       Urinary Tract Infection  Patient complains of persistent pelvic pain and dysuria. Onset was 2 weeks ago, unchanged since that time. Patient denies back pain, fever and vaginal discharge. There is not any concern of sexual abuse. Patient does not have a history of recurrent UTI. Patient does not have a history of pyelonephritis. Patient completed 7 days of levofloxacin. She has diarrhea after taking ciprofloxacin.       Problem List  Patient Active Problem List    Diagnosis Date Noted    Popliteal cyst, right 07/26/2019    Dyslipidemia 05/16/2019    Herniated nucleus pulposus, lumbar 05/16/2019    Lymphedema of both lower extremities 08/27/2018    Osteoporosis 05/01/2017    Gastroesophageal reflux disease without esophagitis 10/26/2016    Advance care planning 10/03/2016    HTN (hypertension), benign 04/19/2016    Stress incontinence of urine 11/24/2015    Cataract 11/19/2015    Morbid obesity ([address] 11/26/2014    Depression with anxiety 11/26/2014    Impaired hearing 11/26/2014       Past Medical History  Past Medical History:   Diagnosis Date    Degenerative disk disease     Depression     Depression     Diffuse idiopathic skeletal hyperostosis     Fracture of leg     Gallstone     Infected prosthetic knee joint ([address] 07/2019    Lymphedema of leg 1975    bilateral lower legs    Morbid obesity (HCC)     Pulmonary arterial hypertension ([address]     MANPREET (stress urinary incontinence, female)         Surgical History  Past Surgical History:   Procedure Laterality Date    HX HYSTERECTOMY      HX KNEE REPLACEMENT Right 2007    HX ORTHOPAEDIC      right shoulder and right knee replaced x 2, left knee replaced as well        Current Medications  Current Outpatient Medications   Medication Sig    levoFLOXacin (LEVAQUIN) 750 mg tablet Take 1 Tab by mouth daily for 7 days.  ibuprofen (MOTRIN) 600 mg tablet TAKE 1 TABLET BY MOUTH EVERY 8 HOURS AS NEEDED FOR PAIN    donepezil (ARICEPT) 5 mg tablet Take 1 Tab by mouth nightly.  alendronate (FOSAMAX) 70 mg tablet Take 1 Tab by mouth every seven (7) days.  furosemide (LASIX) 40 mg tablet Take 1 Tab by mouth daily.  cephALEXin (KEFLEX) 500 mg capsule Take 500 mg by mouth two (2) times a day.  ferrous sulfate (IRON) 325 mg (65 mg iron) cpER Take  by mouth.  atorvastatin (LIPITOR) 10 mg tablet Take 1 Tab by mouth daily.  oxybutynin (DITROPAN) 5 mg tablet Take 1 Tab by mouth three (3) times daily.  losartan (COZAAR) 25 mg tablet TAKE 1 TABLET BY MOUTH ONCE DAILY    sertraline (ZOLOFT) 50 mg tablet Take 1 Tab by mouth daily. No current facility-administered medications for this visit.         Allergies/Drug Reactions  Allergies   Allergen Reactions    Adhesive Other (comments)     Skin burns - red spots    Ciprofloxacin Diarrhea    Lisinopril Other (comments)    Nitrofurantoin Other (comments)        Family History  Family History   Problem Relation Age of Onset    Heart Disease Father     Diabetes Father         Social History  Social History     Socioeconomic History    Marital status:      Spouse name: Not on file    Number of children: Not on file    Years of education: Not on file    Highest education level: Not on file   Occupational History    Not on file   Social Needs    Financial resource strain: Not on file    Food insecurity:     Worry: Not on file     Inability: Not on file    Transportation needs:     Medical: Not on file     Non-medical: Not on file   Tobacco Use    Smoking status: Never Smoker    Smokeless tobacco: Never Used   Substance and Sexual Activity    Alcohol use: No     Alcohol/week: 0.0 standard drinks    Drug use: No    Sexual activity: Not on file   Lifestyle    Physical activity:     Days per week: Not on file Minutes per session: Not on file    Stress: Not on file   Relationships    Social connections:     Talks on phone: Not on file     Gets together: Not on file     Attends [employer] service: Not on file     Active member of club or organization: Not on file     Attends meetings of clubs or organizations: Not on file     Relationship status: Not on file    Intimate partner violence:     Fear of current or ex partner: Not on file     Emotionally abused: Not on file     Physically abused: Not on file     Forced sexual activity: Not on file   Other Topics Concern    Not on file   Social History Narrative    Not on file       Review of Systems  Review of Systems   Constitutional: Negative for chills and fever. Respiratory: Negative for cough. Cardiovascular: Negative for chest pain, palpitations and orthopnea. Gastrointestinal: Negative for abdominal pain, constipation, diarrhea, heartburn, nausea and vomiting. Genitourinary: Positive for dysuria. Negative for flank pain, frequency, hematuria and urgency. Skin: Negative for itching and rash. Neurological: Negative for dizziness and headaches.          Physical Exam  Vital signs:   Vitals:    01/06/20 1408   BP: 100/63   Pulse: 66   Resp: 18   Temp: 98.2 °F (36.8 °C)   TempSrc: Oral   SpO2: 98%   Weight: (P) 214 lb (97.1 kg)   Height: 5' (1.524 m)       General: alert, oriented, not in distress  Eyes: clear conjunctivae, anicteric sclerae, full and equal ROMs  Chest/Lungs: clear breath sounds, no wheezing or crackles  Heart: normal rate, regular rhythm, no murmur  Abdomen: soft, non-distended, non-tender, normal bowel sounds, no organomegaly, no masses, no CVA tenderness  Extremities: no focal deformities, no edema  Neuro: AAOx3, CN's grossly intact  Skin: no visible abnormalities    Laboratory/Tests:  Results for orders placed or performed in visit on 01/06/20   AMB POC URINALYSIS DIP STICK AUTO W/O MICRO     Status: Abnormal   Result Value Ref Range Status    Color (UA POC) Yellow  Final    Clarity (UA POC) Clear  Final    Glucose (UA POC) Negative Negative Final    Bilirubin (UA POC) Negative Negative Final    Ketones (UA POC) Negative Negative Final    Specific gravity (UA POC) 1.025 1.001 - 1.035 Final    Blood (UA POC) Trace Negative Final    pH (UA POC) 6 4.6 - 8.0 Final    Protein (UA POC) 2+ Negative Final    Urobilinogen (UA POC) 0.2 mg/dL 0.2 - 1 Final    Nitrites (UA POC) Negative Negative Final    Leukocyte esterase (UA POC) 3+ Negative Final   Results for orders placed or performed in visit on 02/15/19   AMB POC URINALYSIS DIP STICK AUTO W/ MICRO     Status: None   Result Value Ref Range Status    Color (UA POC) Yellow  Final    Clarity (UA POC) Clear  Final    Glucose (UA POC) Negative Negative Final    Bilirubin (UA POC) Negative Negative Final    Ketones (UA POC) Negative Negative Final    Specific gravity (UA POC) 1.015 1.001 - 1.035 Final    Blood (UA POC) Negative Negative Final    pH (UA POC) 6.5 4.6 - 8.0 Final    Protein (UA POC) Negative Negative Final    Urobilinogen (UA POC) 0.2 mg/dL 0.2 - 1 Final    Nitrites (UA POC) Negative Negative Final    Leukocyte esterase (UA POC) Trace Negative Final   Results for orders placed or performed in visit on 11/26/14   AMB POC URINALYSIS DIP STICK MANUAL W/O MICRO     Status: None   Result Value Ref Range Status    Color (UA POC) Light Yellow  Final    Clarity (UA POC) Clear  Final    Glucose (UA POC) Negative Negative Final    Bilirubin (UA POC) Negative Negative Final    Ketones (UA POC) Negative Negative Final    Specific gravity (UA POC) 1.030 1.001 - 1.035 Final    Blood (UA POC) 1+ Negative Final    pH (UA POC) 5.5 4.6 - 8.0 Final    Protein (UA POC) 1+ Negative mg/dL Final    Urobilinogen (UA POC) 0.2 mg/dL 0.2 - 1 Final    Nitrites (UA POC) Negative Negative Final    Leukocyte esterase (UA POC) 3+ Negative Final     Component Value Ref Range & Units Status   Special Requests: NO SPECIAL REQUESTS    Final   Culture result: Abnormal     Final   >100,000 COLONIES/mL PSEUDOMONAS AERUGINOSA    Susceptibility      Pseudomonas aeruginosa     GREGORIA    Cefepime ($$) <=1 ug/mL S    Ceftazidime ($) <=1 ug/mL S    Ciprofloxacin ($) <=0.25 ug/mL S    Gentamicin ($) <=1 ug/mL S    Imipenem <=0.25 ug/mL S    Levofloxacin ($) 0.5 ug/mL S    Piperacillin/Tazobac ($) <=4 ug/mL S    Tobramycin ($) <=1 ug/mL S              Assessment/Plan:      ICD-10-CM ICD-9-CM    1. Acute UTI N39.0 599.0 AMB POC URINALYSIS DIP STICK AUTO W/O MICRO      levoFLOXacin (LEVAQUIN) 750 mg tablet     Urine culture grew pseudomonas. Patient completed 7 days of levofloxacin, but has persistent symptoms. Will give another 7 days of levofloxacin. She did not tolerate ciprofloxacin (diarrhea). Will fax notes and results to her ID specialist.      Follow-up and Dispositions    · Return if symptoms worsen or fail to improve. I have discussed the diagnosis with the patient and the intended plan as seen in the above orders. The patient has received an after-visit summary and questions were answered concerning future plans. I have discussed medication side effects and warnings with the patient as well. I have reviewed the plan of care with the patient, accepted their input and they are in agreement with the treatment goals.        Harriett Zamorano MD  January 6, 2020

## 2020-01-14 ENCOUNTER — TELEPHONE (OUTPATIENT)
Dept: FAMILY MEDICINE CLINIC | Age: 70
End: 2020-01-14

## 2020-01-14 DIAGNOSIS — N39.0 RECURRENT UTI: Primary | ICD-10-CM

## 2020-01-14 NOTE — TELEPHONE ENCOUNTER
Patient returned my call informed her we faxed over additional notes to lymphedema pump to supplier, we are waiting to hear back from them. Also told her that she was referred to [facility] of [facility]  And they will call her directly for scheduling. She verbalized understanding.

## 2020-01-14 NOTE — TELEPHONE ENCOUNTER
Patient is requesting a referral to urology and she wants to know about her leg pump that was suppose to be ordered a couple months ago but never was. Please advise, thank you.

## 2020-01-14 NOTE — TELEPHONE ENCOUNTER
Pt referred to Urology of [address] [facility]  [address] [facility] [address]  P [phone]  F [phone]

## 2020-01-22 ENCOUNTER — TELEPHONE (OUTPATIENT)
Dept: FAMILY MEDICINE CLINIC | Age: 70
End: 2020-01-22

## 2020-01-22 NOTE — TELEPHONE ENCOUNTER
The ID doctor should do the order for home health since the treatment is being managed by ID. I do not know what antibiotic or dose the patient is supposed to be take.

## 2020-01-22 NOTE — TELEPHONE ENCOUNTER
Infectious disease doctor wants pt to put iv bag in by herself but she cannot do that, requesting a home health care aid for 10 days to treat the disease with help. Please advise, thank you.

## 2020-01-23 NOTE — TELEPHONE ENCOUNTER
Nurse spoke to pt, advised to call ID doctor for [facility]. Pt verbalized understanding. This encounter will be closed.

## 2020-02-06 ENCOUNTER — TELEPHONE (OUTPATIENT)
Dept: FAMILY MEDICINE CLINIC | Age: 70
End: 2020-02-06

## 2020-02-06 DIAGNOSIS — N39.46 MIXED STRESS AND URGE URINARY INCONTINENCE: Primary | ICD-10-CM

## 2020-02-06 NOTE — TELEPHONE ENCOUNTER
Robb Cervantes  from [facility] needs to know if the patient can be taken off the oxybutin and prescribed something else as the oxybutin is making the patient have bad side effects such as pain urinating, and lower back pain. She asked for a call back at [phone]. Please advise.

## 2020-02-07 NOTE — TELEPHONE ENCOUNTER
[place] for [facility]one to return call. Nurse spoke to José and advised that medication Myrbetriq sent to pharmacy.

## 2020-02-07 NOTE — TELEPHONE ENCOUNTER
[facility] home health called in and was asking for a verbal orders for  to continue coming to the house and an eval for occupational therapy. Phone number that can be reached to do this is [phone]. Please advise.

## 2020-02-24 ENCOUNTER — TELEPHONE (OUTPATIENT)
Dept: FAMILY MEDICINE CLINIC | Age: 70
End: 2020-02-24

## 2020-02-24 NOTE — TELEPHONE ENCOUNTER
Gely Vega from [facility] is requesting a verbal order for MSW x3 for mental health counseling.  Please assist.

## 2020-03-17 ENCOUNTER — OFFICE VISIT (OUTPATIENT)
Dept: OBGYN CLINIC | Age: 70
End: 2020-03-17

## 2020-03-17 VITALS
DIASTOLIC BLOOD PRESSURE: 85 MMHG | HEART RATE: 70 BPM | RESPIRATION RATE: 16 BRPM | WEIGHT: 208 LBS | OXYGEN SATURATION: 100 % | BODY MASS INDEX: 40.84 KG/M2 | HEIGHT: 60 IN | SYSTOLIC BLOOD PRESSURE: 148 MMHG

## 2020-03-17 DIAGNOSIS — R10.2 PELVIC PAIN IN FEMALE: Primary | ICD-10-CM

## 2020-03-17 NOTE — PROGRESS NOTES
Chief Complaint   Patient presents with    Pelvic Pain     Mammogram 2/14/2020  Colonoscopy appr 3-4 years ago    Is someone accompanying this pt? Yes, best friend/HETAL Lopester  Patient does have any concerns at this time. Is the patient using any DME equipment during OV?  Yes, walker    Depression Screening:  3 most recent PHQ Screens 3/17/2020   Little interest or pleasure in doing things Nearly every day   Feeling down, depressed, irritable, or hopeless Nearly every day   Total Score PHQ 2 6   Trouble falling or staying asleep, or sleeping too much Several days   Feeling tired or having little energy Nearly every day   Poor appetite, weight loss, or overeating Nearly every day   Feeling bad about yourself - or that you are a failure or have let yourself or your family down Nearly every day   Trouble concentrating on things such as school, work, reading, or watching TV More than half the days   Moving or speaking so slowly that other people could have noticed; or the opposite being so fidgety that others notice More than half the days   Thoughts of being better off dead, or hurting yourself in some way Not at all   PHQ 9 Score 20   How difficult have these problems made it for you to do your work, take care of your home and get along with others Somewhat difficult
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
## 2020-03-26 NOTE — PROGRESS NOTES
This is a Subsequent Medicare Annual Wellness Visit providing Personalized Prevention Plan Services (PPPS) (Performed 12 months after initial AWV and PPPS )    I have reviewed the patient's medical history in detail and updated the computerized patient record. History     Past Medical History:   Diagnosis Date    Degenerative disk disease     Depression     Depression     Diffuse idiopathic skeletal hyperostosis     Fracture of leg     Gallstone     Lymphedema of leg 1975    bilateral lower legs    Morbid obesity ([address]     Pulmonary arterial hypertension (HCC)     MANPREET (stress urinary incontinence, female)       Past Surgical History:   Procedure Laterality Date    HX HYSTERECTOMY      HX KNEE REPLACEMENT      HX ORTHOPAEDIC      right shoulder and right knee replaced x 2, left knee replaced as well     Current Outpatient Prescriptions   Medication Sig Dispense Refill    oxybutynin (DITROPAN) 5 mg tablet TAKE ONE TABLET BY MOUTH ONCE DAILY 90 Tab 0    atorvastatin (LIPITOR) 10 mg tablet Take 1 Tab by mouth daily. 90 Tab 0    alendronate (FOSAMAX) 70 mg tablet TAKE ONE TABLET BY MOUTH EVERY SUNDAY 4 Tab 0    losartan (COZAAR) 25 mg tablet TAKE ONE TABLET BY MOUTH ONCE DAILY 90 Tab 0    furosemide (LASIX) 40 mg tablet One po daily 30 Tab 3    ergocalciferol (ERGOCALCIFEROL) 50,000 unit capsule Take 1 Cap by mouth every seven (7) days. 4 Cap 12    DULoxetine (CYMBALTA) 60 mg capsule Take 1 Cap by mouth daily. 90 Cap 3    losartan (COZAAR) 25 mg tablet Take 1 Tab by mouth daily. 90 Tab 1    acetaminophen-codeine (TYLENOL-CODEINE #3) 300-30 mg per tablet Take 1 Tab by mouth every six (6) hours as needed for Pain. Max Daily Amount: 4 Tabs. 40 Tab 0    potassium chloride (K-DUR, KLOR-CON) 10 mEq tablet Take 1 Tab by mouth daily. 90 Tab 1    pantoprazole (PROTONIX) 40 mg tablet Take 1 Tab by mouth daily.  90 Tab 3    metFORMIN (GLUCOPHAGE) 1,000 mg tablet Take 1 Tab by mouth two (2) times daily (with meals). 180 Tab 1    albuterol (PROVENTIL HFA, VENTOLIN HFA, PROAIR HFA) 90 mcg/actuation inhaler Take 2 Puffs by inhalation every six (6) hours as needed for Wheezing. 1 Inhaler 3     Allergies   Allergen Reactions    Adhesive Other (comments)     Skin burns - red spots    Lisinopril Other (comments)    Nitrofurantoin Other (comments)     Family History   Problem Relation Age of Onset    Heart Disease Father     Diabetes Father      Social History   Substance Use Topics    Smoking status: Never Smoker    Smokeless tobacco: Never Used    Alcohol use No     Patient Active Problem List   Diagnosis Code    Pulmonary HTN ([facility]) I27.2    Snoring R06.83    Morbid obesity ([facility] E66.01    Stress incontinence N39.3    Depression F32.9    Impaired hearing H91.90    Sjogrens syndrome ([facility] M35.00    Leg swelling M79.89    Cataract H26.9    Urinary incontinence R32    Epulis K06.8    HTN (hypertension), benign I10    Morbid obesity due to excess calories ([facility]) E66.01    Advance care planning Z71.89    Gastroesophageal reflux disease without esophagitis K21.9    Osteoporosis M81.0    Memory impairment R41.3       Depression Risk Factor Screening:   No flowsheet data found. Alcohol Risk Factor Screening: On any occasion during the past 3 months, have you had more than 3 drinks containing alcohol? No    Do you average more than 7 drinks per week? No        Functional Ability and Level of Safety:     Hearing Loss   severe    Activities of Daily Living   Self-care. Requires assistance with: no ADLs    Fall Risk   Fall Risk Assessment, last 12 mths 2/1/2017   Able to walk? Yes   Fall in past 12 months?  No   Fall with injury? -   Number of falls in past 12 months -   Fall Risk Score -     Abuse Screen   Patient is not abused    Review of Systems   A comprehensive review of systems was negative except for: Genitourinary: positive for dysuria  Integument/breast: positive for rash  Neurological: positive for memory problems    Physical Examination     Evaluation of Cognitive Function:  Mood/affect:  happy  Appearance: age appropriate  Family member/caregiver input:     Visit Vitals    /83    Pulse 71    Temp 96.5 °F (35.8 °C) (Oral)    Resp 18    Ht 5' 1\" (1.549 m)    Wt 299 lb 3.2 oz (135.7 kg)    SpO2 93%    BMI 56.53 kg/m2     General:  Alert, cooperative, no distress, appears stated age. Head:  Normocephalic, without obvious abnormality, atraumatic. Eyes:  Conjunctivae/corneas clear. PERRL, EOMs intact. Fundi benign. Ears:  Normal TMs and external ear canals both ears. Nose: Nares normal. Septum midline. Mucosa normal. No drainage or sinus tenderness. Throat: Lips, mucosa, and tongue normal. Teeth and gums normal.   Neck: Supple, symmetrical, trachea midline, no adenopathy, thyroid: no enlargement/tenderness/nodules, no carotid bruit and no JVD. Back:   Symmetric, no curvature. ROM normal. No CVA tenderness. Lungs:   Clear to auscultation bilaterally. Chest wall:  No tenderness or deformity. Heart:  Regular rate and rhythm, S1, S2 normal, no murmur, click, rub or gallop. Abdomen:   Soft, non-tender. Bowel sounds normal. No masses,  No organomegaly. Extremities: Extremities normal, atraumatic, no cyanosis or edema. Pulses: 2+ and symmetric all extremities. Skin: Skin color, texture, turgor normal. No rashes or lesions. Lymph nodes: Cervical, supraclavicular, and axillary nodes normal.   Neurologic: CNII-XII intact. Normal strength, sensation and reflexes throughout.        Patient Care Team:  Estuardo Mendoza MD as PCP - General (Internal Medicine)  Sierra Montana MD (General Surgery)    Advice/Referrals/Counseling   Education and counseling provided:  Are appropriate based on today's review and evaluation      Assessment/Plan   current treatment plan is effective, no change in therapy.      --------------------------------------------------------------  Alyssa Seen is a 79 y.o.  female and presents with     Chief Complaint   Patient presents with    Memory Loss    Weight Loss    Rash       Pt feels like she is forgetting things. Pt also has rash on left cheek for couple of months and that may be Nexium was causing it . She stopped taking it 2 weeks ago but still has rash on her cheek. She also has dysuria. Past Medical History:   Diagnosis Date    Degenerative disk disease     Depression     Depression     Diffuse idiopathic skeletal hyperostosis     Fracture of leg     Gallstone     Lymphedema of leg 1975    bilateral lower legs    Morbid obesity ([address]     Pulmonary arterial hypertension (HCC)     MANPREET (stress urinary incontinence, female)      Past Surgical History:   Procedure Laterality Date    HX HYSTERECTOMY      HX KNEE REPLACEMENT      HX ORTHOPAEDIC      right shoulder and right knee replaced x 2, left knee replaced as well     Current Outpatient Prescriptions   Medication Sig    oxybutynin (DITROPAN) 5 mg tablet TAKE ONE TABLET BY MOUTH ONCE DAILY    atorvastatin (LIPITOR) 10 mg tablet Take 1 Tab by mouth daily.  alendronate (FOSAMAX) 70 mg tablet TAKE ONE TABLET BY MOUTH EVERY SUNDAY    losartan (COZAAR) 25 mg tablet TAKE ONE TABLET BY MOUTH ONCE DAILY    furosemide (LASIX) 40 mg tablet One po daily    ergocalciferol (ERGOCALCIFEROL) 50,000 unit capsule Take 1 Cap by mouth every seven (7) days.  DULoxetine (CYMBALTA) 60 mg capsule Take 1 Cap by mouth daily.  losartan (COZAAR) 25 mg tablet Take 1 Tab by mouth daily.  acetaminophen-codeine (TYLENOL-CODEINE #3) 300-30 mg per tablet Take 1 Tab by mouth every six (6) hours as needed for Pain. Max Daily Amount: 4 Tabs.  potassium chloride (K-DUR, KLOR-CON) 10 mEq tablet Take 1 Tab by mouth daily.  pantoprazole (PROTONIX) 40 mg tablet Take 1 Tab by mouth daily.     metFORMIN (GLUCOPHAGE) 1,000 mg tablet Take 1 Tab by mouth two (2) times daily (with meals).  albuterol (PROVENTIL HFA, VENTOLIN HFA, PROAIR HFA) 90 mcg/actuation inhaler Take 2 Puffs by inhalation every six (6) hours as needed for Wheezing. No current facility-administered medications for this visit. Health Maintenance   Topic Date Due    GLAUCOMA SCREENING Q2Y  07/09/2017    Pneumococcal 65+ Low/Medium Risk (2 of 2 - PCV13) 07/26/2017    INFLUENZA AGE 9 TO ADULT  08/01/2017    MEDICARE YEARLY EXAM  08/08/2018    BREAST CANCER SCRN MAMMOGRAM  12/29/2018    COLONOSCOPY  07/25/2023    DTaP/Tdap/Td series (2 - Td) 07/09/2025    Hepatitis C Screening  Completed    OSTEOPOROSIS SCREENING (DEXA)  Completed    ZOSTER VACCINE AGE 60>  Completed     Immunization History   Administered Date(s) Administered    Influenza High Dose Vaccine PF 10/19/2015, 08/12/2016    Pneumococcal Conjugate (PCV-13) 08/07/2017    Pneumococcal Polysaccharide (PPSV-23) 07/09/2015, 07/26/2016    Tdap 07/09/2015     No LMP recorded. Patient has had a hysterectomy. Allergies and Intolerances: Allergies   Allergen Reactions    Adhesive Other (comments)     Skin burns - red spots    Lisinopril Other (comments)    Nitrofurantoin Other (comments)       Family History:   Family History   Problem Relation Age of Onset    Heart Disease Father     Diabetes Father        Social History:   She  reports that she has never smoked. She has never used smokeless tobacco.  She  reports that she does not drink alcohol.             Review of Systems:   General: negative for - chills, fatigue, fever, weight change  Psych: negative for - anxiety, depression, irritability or mood swings  ENT: negative for - headaches, hearing change, nasal congestion, oral lesions, sneezing or sore throat  Heme/ Lymph: negative for - bleeding problems, bruising, pallor or swollen lymph nodes  Endo: negative for - hot flashes, polydipsia/polyuria or temperature intolerance  Resp: negative for - cough, shortness of breath or wheezing  CV: negative for - chest pain, edema or palpitations  GI: negative for - abdominal pain, change in bowel habits, constipation, diarrhea or nausea/vomiting  : negative for - dysuria, hematuria, incontinence, pelvic pain or vulvar/vaginal symptoms  MSK: negative for - joint pain, joint swelling or muscle pain  Neuro: negative for - confusion, headaches, seizures or weakness  Derm: negative for - dry skin, hair changes, rash or skin lesion changes          Physical:   Vitals:   Vitals:    08/07/17 0811   BP: 135/83   Pulse: 71   Resp: 18   Temp: 96.5 °F (35.8 °C)   TempSrc: Oral   SpO2: 93%   Weight: 299 lb 3.2 oz (135.7 kg)   Height: 5' 1\" (1.549 m)           Exam:   HEENT- atraumatic,normocephalic, awake, oriented, well nourished, rash on left cheek  Neck - supple,no enlarged lymph nodes, no JVD, no thyromegaly  Chest- CTA, no rhonchi, no crackles  Heart- rrr, no murmurs / gallop/rub  Abdomen- soft,BS+,NT, no hepatosplenomegaly  Ext - no c/c/edema   Neuro- no focal deficits. Power 5/5 all extremities  Skin - warm,dry, no obvious rashes. Review of Data:   LABS:   Lab Results   Component Value Date/Time    WBC 5.3 01/25/2017 09:59 AM    HGB 13.3 01/25/2017 09:59 AM    HCT 40.2 01/25/2017 09:59 AM    PLATELET 666 11/16/6631 09:59 AM     Lab Results   Component Value Date/Time    Sodium 144 01/25/2017 09:59 AM    Potassium 4.5 01/25/2017 09:59 AM    Chloride 100 01/25/2017 09:59 AM    CO2 24 01/25/2017 09:59 AM    Glucose 75 01/25/2017 09:59 AM    BUN 11 01/25/2017 09:59 AM    Creatinine 0.70 01/25/2017 09:59 AM     Lab Results   Component Value Date/Time    Cholesterol, total 179 01/25/2017 09:59 AM    HDL Cholesterol 75 01/25/2017 09:59 AM    LDL, calculated 78 01/25/2017 09:59 AM    Triglyceride 130 01/25/2017 09:59 AM     No results found for: GPT        Impression / Plan:        ICD-10-CM ICD-9-CM    1.  Frequent urination R35.0 788.41 AMB POC URINALYSIS DIP STICK AUTO W/O MICRO      CULTURE, URINE   2. Rash of face R21 782.1 LAURIE QL, W/REFLEX CASCADE      LAURIE QL, W/REFLEX CASCADE   3. Sjogren's syndrome, with unspecified organ involvement ([facility] M35.00 710.2    4. Memory impairment R41.3 780.93 TSH 3RD GENERATION      RPR      VITAMIN B12 & FOLATE   5. IFG (impaired fasting glucose) N02.52 535.74 METABOLIC PANEL, COMPREHENSIVE      HEMOGLOBIN A1C WITH EAG      CBC WITH AUTOMATED DIFF   6. Weight loss R63.4 783.21    7. Dysuria R30.0 788. 1 CULTURE, URINE   8. Encounter for vaccination Z23 V05.9 PNEUMOCOCCAL CONJ VACCINE 13 VALENT IM   9. Medicare annual wellness visit, subsequent Z00.00 V70.0    10. Glaucoma screening Z13.5 V80.1 REFERRAL TO OPHTHALMOLOGY   11. Routine general medical examination at a health care facility Z00.00 V70.0    12. Screening for alcoholism Z13.89 V79.1    13. Spinal stenosis, unspecified spinal region M48.00 724.00    14. Unsteady gait R26.81 781.2 REFERRAL TO PHYSICAL THERAPY         Explained to patient risk benefits of the medications. Advised patient to stop meds if having any side effects. Pt verbalized understanding of the instructions. I have discussed the diagnosis with the patient and the intended plan as seen in the above orders. The patient has received an after-visit summary and questions were answered concerning future plans. I have discussed medication side effects and warnings with the patient as well. I have reviewed the plan of care with the patient, accepted their input and they are in agreement with the treatment goals. Reviewed plan of care. Patient has provided input and agrees with goals. Follow-up Disposition:  Return in 1 month (on 9/7/2017). Janice Fernandez MD                      Chronic conditions      Memory impairment    Fatigue   Wt loss    Dysuria    O/e - papular rash on lef tcheek    Lymphedema in both legs.     Gait imbalance - spinal stenosis, wt, lymphedema, ear problems yes

## 2020-04-22 ENCOUNTER — TELEPHONE (OUTPATIENT)
Dept: FAMILY MEDICINE CLINIC | Age: 70
End: 2020-04-22

## 2020-04-22 NOTE — TELEPHONE ENCOUNTER
[facility] [address] is faxing an order for Dr. CRAIG to sign for pt. to continue PT for balance and strengthening.  Please assist.

## 2020-04-24 ENCOUNTER — OFFICE VISIT (OUTPATIENT)
Dept: FAMILY MEDICINE CLINIC | Age: 70
End: 2020-04-24

## 2020-04-24 VITALS
WEIGHT: 207 LBS | HEIGHT: 60 IN | RESPIRATION RATE: 17 BRPM | HEART RATE: 50 BPM | OXYGEN SATURATION: 100 % | DIASTOLIC BLOOD PRESSURE: 70 MMHG | TEMPERATURE: 97.8 F | BODY MASS INDEX: 40.64 KG/M2 | SYSTOLIC BLOOD PRESSURE: 113 MMHG

## 2020-04-24 DIAGNOSIS — Z01.818 PRE-OP EXAMINATION: Primary | ICD-10-CM

## 2020-04-24 DIAGNOSIS — H40.023 OPEN ANGLE WITH BORDERLINE FINDINGS, HIGH RISK, BILATERAL: ICD-10-CM

## 2020-04-24 DIAGNOSIS — H25.13 AGE-RELATED NUCLEAR CATARACT, BILATERAL: ICD-10-CM

## 2020-04-24 DIAGNOSIS — I10 HTN (HYPERTENSION), BENIGN: ICD-10-CM

## 2020-04-24 DIAGNOSIS — R10.12 LEFT UPPER QUADRANT PAIN: ICD-10-CM

## 2020-04-24 DIAGNOSIS — E78.5 DYSLIPIDEMIA: ICD-10-CM

## 2020-04-24 NOTE — PROGRESS NOTES
Preoperative Evaluation    Date of Exam: 04/24/20      Narinder Horn  Is a 79 y.o.  female  who presents for preoperative evaluation and management of     Chief Complaint   Patient presents with    Pre-op Exam       History of Present Illness:  Procedure/Surgery: Cataract and Glaucoma surgery  Date of Procedure/Surgery: 5/8/2020  Surgeon:  Dr. Schofield Phi:  [facility]  Primary Physician: True Roberts MD  Latex Allergy: Yes - rash    Exercise capacity: Patient does not exercise. Anesthesia Complications: None  History of abnormal bleeding : None  History of Blood Transfusions: no  Health Care Directive or Living Will: yes    Recent use of: NSAIDs    Tetanus up to date: last tetanus booster within 10 years    Cardiovascular Review:  The patient has hypertension and hyperlipidemia. Diet and Lifestyle: generally follows a low fat low cholesterol diet, generally follows a low sodium diet, sedentary, nonsmoker  Home BP Monitoring: is not measured at home. Pertinent ROS: taking medications as instructed, no medication side effects noted, no TIA's, no chest pain on exertion, no dyspnea on exertion, no swelling of ankles. Abdominal Pain  Patient complains of abdominal pain. The pain is described as aching, and is 7/10 in intensity. Pain is located in the LUQ without radiation. Onset was a few hours ago. Symptoms have been unchanged since. Aggravating factors: movement. Alleviating factors: recumbency. Associated symptoms: none. The patient denies anorexia, constipation, diarrhea, dysuria, hematuria, melena, nausea, urinary frequency, urinary urgency and vomiting.       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 90 Tab    furosemide (LASIX) 40 mg tablet Take 1 Tab by mouth daily. 90 Tab    cephALEXin (KEFLEX) 500 mg capsule Take 500 mg by mouth two (2) times a day.  ferrous sulfate (IRON) 325 mg (65 mg iron) cpER Take  by mouth.  atorvastatin (LIPITOR) 10 mg tablet Take 1 Tab by mouth daily. 90 Tab    losartan (COZAAR) 25 mg tablet TAKE 1 TABLET BY MOUTH ONCE DAILY 90 Tab    sertraline (ZOLOFT) 50 mg tablet Take 1 Tab by mouth daily. 90 Tab    conjugated estrogens (PREMARIN) 0.625 mg/gram vaginal cream Insert 1 g into vagina daily. Place 1 small pea size amount of Premarin on tip of the finger and apply to the outside of urethra x 3 days. 30 g    conjugated estrogens (PREMARIN) 0.625 mg/gram vaginal cream Insert 0.5 g into vagina three (3) days a week. Do not use applicator, apply a pea-size amount with fingertip. 30 g     No current facility-administered medications for this visit.         Allergies/Drug Reactions  Allergies   Allergen Reactions    Adhesive Other (comments)     Skin burns - red spots    Ciprofloxacin Diarrhea    Lisinopril Other (comments)    Nitrofurantoin Other (comments)        Family History  Family History   Problem Relation Age of Onset    Heart Disease Father     Diabetes Father         Social History  Social History     Tobacco Use    Smoking status: Never Smoker    Smokeless tobacco: Never Used   Substance Use Topics    Alcohol use: No     Alcohol/week: 0.0 standard drinks    Drug use: No        Review of Systems  ROS     Physical Exam:  Vitals:    04/24/20 1426   BP: 113/70   Pulse: (!) 50   Resp: 17   Temp: 97.8 °F (36.6 °C)   TempSrc: Oral   SpO2: 100%   Weight: 207 lb (93.9 kg)   Height: 5' (1.524 m)       General: alert, oriented, not in distress  Head: scalp normal, atraumatic  Eyes: pupils are equal and reactive, full and intact EOM's  Lips/Mouth: moist lips and buccal mucosa, non-enlarged tonsils, pink throat  Neck: supple, no JVD, no lymphadenopathy, non-palpable thyroid  Chest/Lungs: clear breath sounds, no wheezing or crackles  Heart: normal rate, regular rhythm, no murmur  Abdomen: soft, non-distended,  Tender over left upper quadrant, normal bowel sounds, no organomegaly, no masses  Extremities: no focal deformities, no edema  Skin: no active skin lesions    Impression:  1. Pre-op examination, Age-related nuclear cataract, bilateral, Open angle with borderline findings, high risk, bilateral  - No contraindication for planned surgery. Patient is low risk for procedure. - No work-up needed  - Advised patient to stop NSAIDs one week prior to surgery    2. Hypertension, Hypercholesterolemia  - controlled  - continue same medications    3.  Left upper quadrant pain  - monitor  - trial of Miralax  - advised ED precautions: bloody stools, fever, severe abdominal pain      Luz Elena Disla MD  04/24/20

## 2020-04-24 NOTE — PROGRESS NOTES
Rufino Moss presents today for pre-op clearance, cataract/glaucome surgery  - Is someone accompanying this pt? no  - Is the patient using any durable medical equipment during office visit? Roni    Coordination of Care:  1. Have you been to the ER, urgent care clinic since your last visit? Hospitalized since your last visit? No    2. Have you seen or consulted any other health care providers outside of the [address] [place] [address] since your last visit? Include any pap smears or colon screening.  No    Depression Screening:  3 most recent PHQ Screens 3/17/2020   Little interest or pleasure in doing things Nearly every day   Feeling down, depressed, irritable, or hopeless Nearly every day   Total Score PHQ 2 6   Trouble falling or staying asleep, or sleeping too much Several days   Feeling tired or having little energy Nearly every day   Poor appetite, weight loss, or overeating Nearly every day   Feeling bad about yourself - or that you are a failure or have let yourself or your family down Nearly every day   Trouble concentrating on things such as school, work, reading, or watching TV More than half the days   Moving or speaking so slowly that other people could have noticed; or the opposite being so fidgety that others notice More than half the days   Thoughts of being better off dead, or hurting yourself in some way Not at all   PHQ 9 Score 20   How difficult have these problems made it for you to do your work, take care of your home and get along with others Somewhat difficult       Learning Assessment:  Learning Assessment 5/16/2019   PRIMARY LEARNER Patient   HIGHEST LEVEL OF EDUCATION - PRIMARY LEARNER  GRADUATED HIGH SCHOOL OR GED   BARRIERS PRIMARY LEARNER NONE   CO-LEARNER CAREGIVER No   PRIMARY LANGUAGE ENGLISH   LEARNER PREFERENCE PRIMARY LISTENING   ANSWERED BY patient   RELATIONSHIP SELF       Abuse Screening:  Abuse Screening Questionnaire 7/12/2019   Do you ever feel afraid of your partner? N   Are you in a relationship with someone who physically or mentally threatens you? N   Is it safe for you to go home? Y       Fall Risk  Fall Risk Assessment, last 12 mths 3/17/2020   Able to walk? Yes   Fall in past 12 months? No   Fall with injury? -   Number of falls in past 12 months -   Fall Risk Score -       Health Maintenance reviewed and discussed and ordered per Provider.   Health Maintenance Due   Topic Date Due    Shingrix Vaccine Age 49> (1 of 2) 01/31/2000    Lipid Screen  05/07/2019

## 2020-04-29 DIAGNOSIS — I10 HTN (HYPERTENSION), BENIGN: ICD-10-CM

## 2020-04-29 RX ORDER — FUROSEMIDE 40 MG/1
TABLET ORAL
Qty: 90 TAB | Refills: 0 | Status: SHIPPED | OUTPATIENT
Start: 2020-04-29 | End: 2020-08-03

## 2020-05-19 DIAGNOSIS — R41.3 MEMORY DIFFICULTIES: ICD-10-CM

## 2020-05-19 RX ORDER — DONEPEZIL HYDROCHLORIDE 5 MG/1
TABLET, FILM COATED ORAL
Qty: 30 TAB | Refills: 0 | Status: SHIPPED | OUTPATIENT
Start: 2020-05-19 | End: 2020-07-20 | Stop reason: SDUPTHER

## 2020-07-01 ENCOUNTER — TELEPHONE (OUTPATIENT)
Dept: FAMILY MEDICINE CLINIC | Age: 70
End: 2020-07-01
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
## 2020-07-07 ENCOUNTER — VIRTUAL VISIT (OUTPATIENT)
Dept: FAMILY MEDICINE CLINIC | Age: 70
End: 2020-07-07

## 2020-07-07 DIAGNOSIS — S90.421A BLISTER OF GREAT TOE OF RIGHT FOOT, INITIAL ENCOUNTER: Primary | ICD-10-CM

## 2020-07-07 DIAGNOSIS — R41.3 MEMORY DIFFICULTIES: ICD-10-CM

## 2020-07-07 DIAGNOSIS — F41.8 DEPRESSION WITH ANXIETY: ICD-10-CM

## 2020-07-07 DIAGNOSIS — I10 HTN (HYPERTENSION), BENIGN: ICD-10-CM

## 2020-07-07 DIAGNOSIS — E78.5 DYSLIPIDEMIA: ICD-10-CM

## 2020-07-07 DIAGNOSIS — M81.0 AGE-RELATED OSTEOPOROSIS WITHOUT CURRENT PATHOLOGICAL FRACTURE: ICD-10-CM
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
## 2020-07-08 ENCOUNTER — TELEPHONE (OUTPATIENT)
Dept: FAMILY MEDICINE CLINIC | Age: 70
End: 2020-07-08

## 2020-07-08 NOTE — TELEPHONE ENCOUNTER
Pt. Stated the blister that was on her right big toe burst last night and she wants to know what she need to do and if she needs to keep her appt. For tomorrow. Please advise.

## 2020-07-09 ENCOUNTER — OFFICE VISIT (OUTPATIENT)
Dept: FAMILY MEDICINE CLINIC | Age: 70
End: 2020-07-09

## 2020-07-09 VITALS
TEMPERATURE: 98.1 F | RESPIRATION RATE: 16 BRPM | WEIGHT: 201 LBS | DIASTOLIC BLOOD PRESSURE: 72 MMHG | SYSTOLIC BLOOD PRESSURE: 109 MMHG | BODY MASS INDEX: 37.95 KG/M2 | HEART RATE: 107 BPM | OXYGEN SATURATION: 98 % | HEIGHT: 61 IN

## 2020-07-09 DIAGNOSIS — T30.0 BURN: Primary | ICD-10-CM
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
## 2020-07-17 DIAGNOSIS — R41.3 MEMORY DIFFICULTIES: ICD-10-CM

## 2020-07-17 RX ORDER — DONEPEZIL HYDROCHLORIDE 5 MG/1
TABLET, FILM COATED ORAL
Qty: 30 TAB | Refills: 0 | OUTPATIENT
Start: 2020-07-17

## 2020-07-20 RX ORDER — DONEPEZIL HYDROCHLORIDE 5 MG/1
TABLET, FILM COATED ORAL
Qty: 90 TAB | Refills: 0 | Status: SHIPPED | OUTPATIENT
Start: 2020-07-20 | End: 2020-10-30 | Stop reason: SDUPTHER

## 2020-08-11 ENCOUNTER — TELEPHONE (OUTPATIENT)
Dept: FAMILY MEDICINE CLINIC | Age: 70
End: 2020-08-11

## 2020-08-11 NOTE — TELEPHONE ENCOUNTER
Pt called in and stated that she has had symptoms of a UTI for the last 4 days and she was requesting an in office visit or to drop off a urine sample so she could get some antibiotics. She would like a call back from the nurse. Please advise.

## 2020-08-20 NOTE — PROGRESS NOTES
Puneet Comment is a 71 y.o. female who presents today for dysuria    1. Have you been to the ER, urgent care clinic since your last visit? Hospitalized since your last visit? no    2. Have you seen or consulted any other health care providers outside of the [address] [place] [address] since your last visit? Include any pap smears or colon screening. no     Health Maintenance reviewed.     Health Maintenance Due   Topic Date Due    Shingrix Vaccine Age 49> (1 of 2) 01/31/2000    GLAUCOMA SCREENING Q2Y  07/09/2017 Transposition Flap Text: The defect edges were debeveled with a #15 scalpel blade.  Given the location of the defect and the proximity to free margins a transposition flap was deemed most appropriate.  Using a sterile surgical marker, an appropriate transposition flap was drawn incorporating the defect.    The area thus outlined was incised deep to adipose tissue with a #15 scalpel blade.  The skin margins were undermined to an appropriate distance in all directions utilizing iris scissors.

## 2020-10-01 ENCOUNTER — HOSPITAL ENCOUNTER (OUTPATIENT)
Dept: LAB | Age: 70
Discharge: HOME | End: 2020-10-01
Payer: MEDICARE

## 2020-10-01 ENCOUNTER — APPOINTMENT (OUTPATIENT)
Dept: FAMILY MEDICINE CLINIC | Age: 70
End: 2020-10-01

## 2020-10-01 DIAGNOSIS — I10 HTN (HYPERTENSION), BENIGN: ICD-10-CM

## 2020-10-01 DIAGNOSIS — M81.0 AGE-RELATED OSTEOPOROSIS WITHOUT CURRENT PATHOLOGICAL FRACTURE: ICD-10-CM

## 2020-10-01 DIAGNOSIS — E78.5 DYSLIPIDEMIA: ICD-10-CM

## 2020-10-01 LAB
25(OH)D3 SERPL-MCNC: 28.2 NG/ML (ref 30–100)
ALBUMIN SERPL-MCNC: 3.5 G/DL (ref 3.4–5)
ALBUMIN/GLOB SERPL: 1.2 {RATIO} (ref 0.8–1.7)
ALP SERPL-CCNC: 90 U/L (ref 45–117)
ALT SERPL-CCNC: 20 U/L (ref 13–56)
ANION GAP SERPL CALC-SCNC: 1 MMOL/L (ref 3–18)
APPEARANCE UR: ABNORMAL
AST SERPL-CCNC: 10 U/L (ref 10–38)
BACTERIA URNS QL MICRO: ABNORMAL /HPF
BASOPHILS # BLD: 0 K/UL (ref 0–0.1)
BASOPHILS NFR BLD: 1 % (ref 0–2)
BILIRUB SERPL-MCNC: 0.9 MG/DL (ref 0.2–1)
BILIRUB UR QL: NEGATIVE
BUN SERPL-MCNC: 16 MG/DL (ref 7–18)
BUN/CREAT SERPL: 21 (ref 12–20)
CALCIUM SERPL-MCNC: 9 MG/DL (ref 8.5–10.1)
CHLORIDE SERPL-SCNC: 111 MMOL/L (ref 100–111)
CHOLEST SERPL-MCNC: 152 MG/DL
CO2 SERPL-SCNC: 32 MMOL/L (ref 21–32)
COLOR UR: YELLOW
CREAT SERPL-MCNC: 0.75 MG/DL (ref 0.6–1.3)
DIFFERENTIAL METHOD BLD: ABNORMAL
EOSINOPHIL # BLD: 0.1 K/UL (ref 0–0.4)
EOSINOPHIL NFR BLD: 3 % (ref 0–5)
EPITH CASTS URNS QL MICRO: ABNORMAL /LPF (ref 0–5)
ERYTHROCYTE [DISTWIDTH] IN BLOOD BY AUTOMATED COUNT: 13.4 % (ref 11.6–14.5)
GLOBULIN SER CALC-MCNC: 3 G/DL (ref 2–4)
GLUCOSE SERPL-MCNC: 94 MG/DL (ref 74–99)
GLUCOSE UR STRIP.AUTO-MCNC: NEGATIVE MG/DL
HCT VFR BLD AUTO: 35.4 % (ref 35–45)
HDLC SERPL-MCNC: 93 MG/DL (ref 40–60)
HDLC SERPL: 1.6 {RATIO} (ref 0–5)
HGB BLD-MCNC: 11.5 G/DL (ref 12–16)
HGB UR QL STRIP: ABNORMAL
KETONES UR QL STRIP.AUTO: NEGATIVE MG/DL
LDLC SERPL CALC-MCNC: 49 MG/DL (ref 0–100)
LEUKOCYTE ESTERASE UR QL STRIP.AUTO: ABNORMAL
LIPID PROFILE,FLP: ABNORMAL
LYMPHOCYTES # BLD: 0.8 K/UL (ref 0.9–3.6)
LYMPHOCYTES NFR BLD: 21 % (ref 21–52)
MCH RBC QN AUTO: 30.1 PG (ref 24–34)
MCHC RBC AUTO-ENTMCNC: 32.5 G/DL (ref 31–37)
MCV RBC AUTO: 92.7 FL (ref 74–97)
MONOCYTES # BLD: 0.4 K/UL (ref 0.05–1.2)
MONOCYTES NFR BLD: 11 % (ref 3–10)
NEUTS SEG # BLD: 2.4 K/UL (ref 1.8–8)
NEUTS SEG NFR BLD: 64 % (ref 40–73)
NITRITE UR QL STRIP.AUTO: POSITIVE
PH UR STRIP: 6.5 [PH] (ref 5–8)
PLATELET # BLD AUTO: 162 K/UL (ref 135–420)
PMV BLD AUTO: 10.9 FL (ref 9.2–11.8)
POTASSIUM SERPL-SCNC: 3.8 MMOL/L (ref 3.5–5.5)
PROT SERPL-MCNC: 6.5 G/DL (ref 6.4–8.2)
PROT UR STRIP-MCNC: NEGATIVE MG/DL
RBC # BLD AUTO: 3.82 M/UL (ref 4.2–5.3)
RBC #/AREA URNS HPF: ABNORMAL /HPF (ref 0–5)
SODIUM SERPL-SCNC: 144 MMOL/L (ref 136–145)
SP GR UR REFRACTOMETRY: 1.02 (ref 1–1.03)
TRIGL SERPL-MCNC: 50 MG/DL (ref ?–150)
TSH SERPL DL<=0.05 MIU/L-ACNC: 1.64 UIU/ML (ref 0.36–3.74)
UROBILINOGEN UR QL STRIP.AUTO: 1 EU/DL (ref 0.2–1)
VLDLC SERPL CALC-MCNC: 10 MG/DL
WBC # BLD AUTO: 3.7 K/UL (ref 4.6–13.2)
WBC URNS QL MICRO: ABNORMAL /HPF (ref 0–5)

## 2020-10-01 PROCEDURE — 80053 COMPREHEN METABOLIC PANEL: CPT

## 2020-10-01 PROCEDURE — 81001 URINALYSIS AUTO W/SCOPE: CPT

## 2020-10-01 PROCEDURE — 80061 LIPID PANEL: CPT

## 2020-10-01 PROCEDURE — 82306 VITAMIN D 25 HYDROXY: CPT

## 2020-10-01 PROCEDURE — 85025 COMPLETE CBC W/AUTO DIFF WBC: CPT

## 2020-10-01 PROCEDURE — 36415 COLL VENOUS BLD VENIPUNCTURE: CPT

## 2020-10-01 PROCEDURE — 84443 ASSAY THYROID STIM HORMONE: CPT

## 2020-10-09 ENCOUNTER — OFFICE VISIT (OUTPATIENT)
Dept: FAMILY MEDICINE CLINIC | Age: 70
End: 2020-10-09
Payer: MEDICARE

## 2020-10-09 ENCOUNTER — HOSPITAL ENCOUNTER (OUTPATIENT)
Dept: LAB | Age: 70
Discharge: HOME | End: 2020-10-09
Payer: MEDICARE

## 2020-10-09 VITALS
SYSTOLIC BLOOD PRESSURE: 102 MMHG | OXYGEN SATURATION: 99 % | HEIGHT: 61 IN | TEMPERATURE: 98 F | DIASTOLIC BLOOD PRESSURE: 64 MMHG | BODY MASS INDEX: 37.38 KG/M2 | HEART RATE: 68 BPM | RESPIRATION RATE: 15 BRPM | WEIGHT: 198 LBS

## 2020-10-09 DIAGNOSIS — I10 HTN (HYPERTENSION), BENIGN: ICD-10-CM

## 2020-10-09 DIAGNOSIS — Z13.39 SCREENING FOR ALCOHOLISM: ICD-10-CM

## 2020-10-09 DIAGNOSIS — E66.01 MORBID OBESITY (HCC): ICD-10-CM

## 2020-10-09 DIAGNOSIS — M81.0 AGE-RELATED OSTEOPOROSIS WITHOUT CURRENT PATHOLOGICAL FRACTURE: Primary | ICD-10-CM

## 2020-10-09 DIAGNOSIS — R30.0 DYSURIA: ICD-10-CM

## 2020-10-09 DIAGNOSIS — F33.2 SEVERE EPISODE OF RECURRENT MAJOR DEPRESSIVE DISORDER, WITHOUT PSYCHOTIC FEATURES (HCC): ICD-10-CM

## 2020-10-09 DIAGNOSIS — Z00.00 MEDICARE ANNUAL WELLNESS VISIT, SUBSEQUENT: ICD-10-CM

## 2020-10-09 DIAGNOSIS — Z23 NEEDS FLU SHOT: ICD-10-CM

## 2020-10-09 DIAGNOSIS — N39.3 STRESS INCONTINENCE OF URINE: ICD-10-CM

## 2020-10-09 DIAGNOSIS — E78.5 DYSLIPIDEMIA: ICD-10-CM

## 2020-10-09 DIAGNOSIS — R68.89 FORGETFULNESS: ICD-10-CM

## 2020-10-09 PROCEDURE — G9717 DOC PT DX DEP/BP F/U NT REQ: HCPCS | Performed by: INTERNAL MEDICINE

## 2020-10-09 PROCEDURE — G8752 SYS BP LESS 140: HCPCS | Performed by: INTERNAL MEDICINE

## 2020-10-09 PROCEDURE — G0008 ADMIN INFLUENZA VIRUS VAC: HCPCS | Performed by: INTERNAL MEDICINE

## 2020-10-09 PROCEDURE — 87086 URINE CULTURE/COLONY COUNT: CPT

## 2020-10-09 PROCEDURE — 1101F PT FALLS ASSESS-DOCD LE1/YR: CPT | Performed by: INTERNAL MEDICINE

## 2020-10-09 PROCEDURE — 1090F PRES/ABSN URINE INCON ASSESS: CPT | Performed by: INTERNAL MEDICINE

## 2020-10-09 PROCEDURE — 87077 CULTURE AEROBIC IDENTIFY: CPT

## 2020-10-09 PROCEDURE — G8417 CALC BMI ABV UP PARAM F/U: HCPCS | Performed by: INTERNAL MEDICINE

## 2020-10-09 PROCEDURE — 90694 VACC AIIV4 NO PRSRV 0.5ML IM: CPT | Performed by: INTERNAL MEDICINE

## 2020-10-09 PROCEDURE — 87186 SC STD MICRODIL/AGAR DIL: CPT

## 2020-10-09 PROCEDURE — G8754 DIAS BP LESS 90: HCPCS | Performed by: INTERNAL MEDICINE

## 2020-10-09 PROCEDURE — 3017F COLORECTAL CA SCREEN DOC REV: CPT | Performed by: INTERNAL MEDICINE

## 2020-10-09 PROCEDURE — G8536 NO DOC ELDER MAL SCRN: HCPCS | Performed by: INTERNAL MEDICINE

## 2020-10-09 PROCEDURE — G8427 DOCREV CUR MEDS BY ELIG CLIN: HCPCS | Performed by: INTERNAL MEDICINE

## 2020-10-09 PROCEDURE — G9899 SCRN MAM PERF RSLTS DOC: HCPCS | Performed by: INTERNAL MEDICINE

## 2020-10-09 PROCEDURE — G0439 PPPS, SUBSEQ VISIT: HCPCS | Performed by: INTERNAL MEDICINE

## 2020-10-09 PROCEDURE — 99214 OFFICE O/P EST MOD 30 MIN: CPT | Performed by: INTERNAL MEDICINE

## 2020-10-09 RX ORDER — SERTRALINE HYDROCHLORIDE 100 MG/1
200 TABLET, FILM COATED ORAL DAILY
COMMUNITY
Start: 2020-09-21 | End: 2021-04-19 | Stop reason: SDUPTHER

## 2020-10-09 NOTE — PROGRESS NOTES
History of Present Illness  Sosa Parham is a 79 y.o. female who presents today for management of    Chief Complaint   Patient presents with    Annual Wellness Visit    Results       Patient reports of increased forgetfulness. She got lost 3 weeks ago going to a restaurant which she frequently goes to. She also reports of losing things and forgetting where she put things, names of friends. She has not forgotten paying her bills. Cardiovascular Review:  The patient has hypertension, hyperlipidemia and obesity. Diet and Lifestyle: generally follows a low fat low cholesterol diet, generally follows a low sodium diet, sedentary, nonsmoker  Home BP Monitoring: is not measured at home. Pertinent ROS: taking medications as instructed, no medication side effects noted, no TIA's, no chest pain on exertion, no dyspnea on exertion, notes chronic swelling of ankles.      Problem List  Patient Active Problem List    Diagnosis Date Noted    Depression, major, severe recurrence ([facility] [address] 10/09/2020    Open angle with borderline findings, high risk, bilateral 04/24/2020    Age-related nuclear cataract, bilateral 04/24/2020    Popliteal cyst, right 07/26/2019    Dyslipidemia 05/16/2019    Herniated nucleus pulposus, lumbar 05/16/2019    Lymphedema of both lower extremities 08/27/2018    Osteoporosis 05/01/2017    Gastroesophageal reflux disease without esophagitis 10/26/2016    Advance care planning 10/03/2016    HTN (hypertension), benign 04/19/2016    Stress incontinence of urine 11/24/2015    Cataract 11/19/2015    Morbid obesity ([facility] [address] 11/26/2014    Depression with anxiety 11/26/2014    Impaired hearing 11/26/2014       Current Medications  Current Outpatient Medications   Medication Sig    sertraline (ZOLOFT) 100 mg tablet TAKE 1 TABLET BY MOUTH ONCE DAILY    furosemide (LASIX) 40 mg tablet Take 1 tablet by mouth once daily    donepeziL (ARICEPT) 5 mg tablet Take 1 tablet by mouth nightly    conjugated estrogens (PREMARIN) 0.625 mg/gram vaginal cream Insert 1 g into vagina daily. Place 1 small pea size amount of Premarin on tip of the finger and apply to the outside of urethra x 3 days.  conjugated estrogens (PREMARIN) 0.625 mg/gram vaginal cream Insert 0.5 g into vagina three (3) days a week. Do not use applicator, apply a pea-size amount with fingertip.  cephALEXin (KEFLEX) 500 mg capsule Take 500 mg by mouth two (2) times a day.  ferrous sulfate (IRON) 325 mg (65 mg iron) cpER Take  by mouth.  atorvastatin (LIPITOR) 10 mg tablet Take 1 Tab by mouth daily.  losartan (COZAAR) 25 mg tablet TAKE 1 TABLET BY MOUTH ONCE DAILY     No current facility-administered medications for this visit. Allergies/Drug Reactions  Allergies   Allergen Reactions    Adhesive Other (comments)     Skin burns - red spots    Ciprofloxacin Diarrhea    Lisinopril Other (comments)    Nitrofurantoin Other (comments)        Review of Systems  Review of Systems   Constitutional: Negative for chills, fever, malaise/fatigue and weight loss. Respiratory: Negative for cough, sputum production, shortness of breath and wheezing. Cardiovascular: Negative for chest pain and palpitations. Gastrointestinal: Negative. Genitourinary: Positive for dysuria. Musculoskeletal: Negative. Neurological: Negative for dizziness and headaches. Psychiatric/Behavioral: Positive for memory loss. Negative for depression and hallucinations. The patient is not nervous/anxious and does not have insomnia.         Physical Exam  Vital signs:   Vitals:    10/09/20 1144   BP: 102/64   Pulse: 68   Resp: 15   Temp: 98 °F (36.7 °C)   TempSrc: Temporal   SpO2: 99%   Weight: 198 lb (89.8 kg)   Height: 5' 1\" (1.549 m)       General: alert, oriented, not in distress  Head: scalp normal, atraumatic  Eyes: pupils are equal and reactive, full and intact EOM's  Neck: supple, no JVD, no lymphadenopathy, non-palpable thyroid  Chest/Lungs: clear breath sounds, no wheezing or crackles  Heart: normal rate, regular rhythm, no murmur  Extremities: no focal deformities, lymphedema of both legs  Skin: no active skin lesions    Laboratory/Tests:  Component      Latest Ref Rng & Units 10/1/2020 10/1/2020 10/1/2020 10/1/2020           9:18 AM  9:18 AM  9:18 AM  9:18 AM   WBC      4.6 - 13.2 K/uL    3.7 (L)   RBC      4.20 - 5.30 M/uL    3.82 (L)   HGB      12.0 - 16.0 g/dL    11.5 (L)   HCT      35.0 - 45.0 %    35.4   MCV      74.0 - 97.0 FL    92.7   MCH      24.0 - 34.0 PG    30.1   MCHC      31.0 - 37.0 g/dL    32.5   RDW      11.6 - 14.5 %    13.4   PLATELET      840 - 346 K/uL    162   MPV      9.2 - 11.8 FL    10.9   NEUTROPHILS      40 - 73 %    64   LYMPHOCYTES      21 - 52 %    21   MONOCYTES      3 - 10 %    11 (H)   EOSINOPHILS      0 - 5 %    3   BASOPHILS      0 - 2 %    1   ABS. NEUTROPHILS      1.8 - 8.0 K/UL    2.4   ABS. LYMPHOCYTES      0.9 - 3.6 K/UL    0.8 (L)   ABS. MONOCYTES      0.05 - 1.2 K/UL    0.4   ABS. EOSINOPHILS      0.0 - 0.4 K/UL    0.1   ABS. BASOPHILS      0.0 - 0.1 K/UL    0.0   DF          AUTOMATED   Sodium      136 - 145 mmol/L  144     Potassium      3.5 - 5.5 mmol/L  3.8     Chloride      100 - 111 mmol/L  111     CO2      21 - 32 mmol/L  32     Anion gap      3.0 - 18 mmol/L  1 (L)     Glucose      74 - 99 mg/dL  94     BUN      7.0 - 18 MG/DL  16     Creatinine      0.6 - 1.3 MG/DL  0.75     BUN/Creatinine ratio      12 - 20    21 (H)     GFR est AA      >60 ml/min/1.73m2  >60     GFR est non-AA      >60 ml/min/1.73m2  >60     Calcium      8.5 - 10.1 MG/DL  9.0     Bilirubin, total      0.2 - 1.0 MG/DL  0.9     ALT      13 - 56 U/L  20     AST      10 - 38 U/L  10     Alk.  phosphatase      45 - 117 U/L  90     Protein, total      6.4 - 8.2 g/dL  6.5     Albumin      3.4 - 5.0 g/dL  3.5     Globulin      2.0 - 4.0 g/dL  3.0     A-G Ratio      0.8 - 1.7    1.2     Cholesterol, total      <200 MG/DL   152 Triglyceride      <150 MG/DL   50    HDL Cholesterol      40 - 60 MG/DL   93 (H)    LDL, calculated      0 - 100 MG/DL   49    VLDL, calculated      MG/DL   10    CHOL/HDL Ratio      0 - 5.0     1.6    TSH      0.36 - 3.74 uIU/mL 1.64        Component      Latest Ref Rng & Units 10/1/2020 10/1/2020           9:18 AM  9:18 AM   Color           Appearance           Specific gravity      1.005 - 1.030       pH (UA)      5.0 - 8.0       Protein      NEG mg/dL     Glucose      NEG mg/dL     Ketone      NEG mg/dL     Bilirubin      NEG       Blood      NEG       Urobilinogen      0.2 - 1.0 EU/dL     Nitrites      NEG       Leukocyte Esterase      NEG       WBC      0 - 5 /hpf  TOO NUMEROUS TO COUNT   RBC      0 - 5 /hpf  21 to 35   Epithelial cells      0 - 5 /lpf  3+   Bacteria      NEG /hpf  4+ (A)   Vitamin D 25-Hydroxy      30 - 100 ng/mL 28.2 (L)      Component      Latest Ref Rng & Units 10/1/2020           9:18 AM   Color       YELLOW   Appearance       CLOUDY   Specific gravity      1.005 - 1.030   1.018   pH (UA)      5.0 - 8.0   6.5   Protein      NEG mg/dL Negative   Glucose      NEG mg/dL Negative   Ketone      NEG mg/dL Negative   Bilirubin      NEG   Negative   Blood      NEG   SMALL (A)   Urobilinogen      0.2 - 1.0 EU/dL 1.0   Nitrites      NEG   Positive (A)   Leukocyte Esterase      NEG   MODERATE (A)   WBC      0 - 5 /hpf    RBC      0 - 5 /hpf    Epithelial cells      0 - 5 /lpf    Bacteria      NEG /hpf    Vitamin D 25-Hydroxy      30 - 100 ng/mL        Assessment/Plan:    1. Age-related osteoporosis without current pathological fracture  - calcium and vitamin D    2. Forgetfulness  - advised to keep appointment with neurology    3. HTN (hypertension), benign  - controlled    4. Dyslipidemia  - controlled    5. Severe episode of recurrent major depressive disorder, without psychotic features ([address]  - stable  - psych follow-up    6. Morbid obesity ([address]  - Discussed the patient's BMI with her.   The BMI follow up plan is as follows:   dietary management education, guidance, and counseling  encourage exercise  monitor weight    7. Dysuria  - CULTURE, URINE; Future      Follow-up and Dispositions    · Return in about 6 months (around 4/9/2021) for ROV. I have discussed the diagnosis with the patient and the intended plan as seen in the above orders. The patient has received an after-visit summary and questions were answered concerning future plans. I have discussed medication side effects and warnings with the patient as well. I have reviewed the plan of care with the patient, accepted their input and they are in agreement with the treatment goals. Kadeem Monsivais MD  October 9, 2020       This is the Subsequent Medicare Annual Wellness Exam, performed 12 months or more after the Initial AWV or the last Subsequent AWV    I have reviewed the patient's medical history in detail and updated the computerized patient record.      History     Patient Active Problem List   Diagnosis Code    Morbid obesity ([facility] [address] E66.01    Depression with anxiety F41.8    Impaired hearing H91.90    Cataract H26.9    Stress incontinence of urine N39.3    HTN (hypertension), benign I10    Advance care planning Z71.89    Gastroesophageal reflux disease without esophagitis K21.9    Osteoporosis M81.0    Lymphedema of both lower extremities I89.0    Dyslipidemia E78.5    Herniated nucleus pulposus, lumbar M51.26    Popliteal cyst, right M71.21    Open angle with borderline findings, high risk, bilateral H40.023    Age-related nuclear cataract, bilateral H25.13    Depression, major, severe recurrence (HCC) F33.2     Past Medical History:   Diagnosis Date    Degenerative disk disease     Depression     Depression     Diffuse idiopathic skeletal hyperostosis     Fracture of leg     Gallstone     Infected prosthetic knee joint ([facility] [address] 07/2019    Lymphedema of leg 1975    bilateral lower legs    Morbid obesity ([address]     Pulmonary arterial hypertension ([address]     MANPREET (stress urinary incontinence, female)       Past Surgical History:   Procedure Laterality Date    HX HYSTERECTOMY      HX KNEE REPLACEMENT Right 2007    HX ORTHOPAEDIC      right shoulder and right knee replaced x 2, left knee replaced as well     Current Outpatient Medications   Medication Sig Dispense Refill    sertraline (ZOLOFT) 100 mg tablet TAKE 1 TABLET BY MOUTH ONCE DAILY      furosemide (LASIX) 40 mg tablet Take 1 tablet by mouth once daily 90 Tab 1    donepeziL (ARICEPT) 5 mg tablet Take 1 tablet by mouth nightly 90 Tab 0    conjugated estrogens (PREMARIN) 0.625 mg/gram vaginal cream Insert 1 g into vagina daily. Place 1 small pea size amount of Premarin on tip of the finger and apply to the outside of urethra x 3 days. 30 g 1    conjugated estrogens (PREMARIN) 0.625 mg/gram vaginal cream Insert 0.5 g into vagina three (3) days a week. Do not use applicator, apply a pea-size amount with fingertip. 30 g 1    cephALEXin (KEFLEX) 500 mg capsule Take 500 mg by mouth two (2) times a day.  ferrous sulfate (IRON) 325 mg (65 mg iron) cpER Take  by mouth.  atorvastatin (LIPITOR) 10 mg tablet Take 1 Tab by mouth daily.  90 Tab 3    losartan (COZAAR) 25 mg tablet TAKE 1 TABLET BY MOUTH ONCE DAILY 90 Tab 3     Allergies   Allergen Reactions    Adhesive Other (comments)     Skin burns - red spots    Ciprofloxacin Diarrhea    Lisinopril Other (comments)    Nitrofurantoin Other (comments)       Family History   Problem Relation Age of Onset    Heart Disease Father     Diabetes Father      Social History     Tobacco Use    Smoking status: Never Smoker    Smokeless tobacco: Never Used   Substance Use Topics    Alcohol use: No     Alcohol/week: 0.0 standard drinks       Depression Risk Factor Screening:     3 most recent PHQ Screens 10/9/2020   Little interest or pleasure in doing things Not at all   Feeling down, depressed, irritable, or hopeless Not at all   Total Score PHQ 2 0   Trouble falling or staying asleep, or sleeping too much -   Feeling tired or having little energy -   Poor appetite, weight loss, or overeating -   Feeling bad about yourself - or that you are a failure or have let yourself or your family down -   Trouble concentrating on things such as school, work, reading, or watching TV -   Moving or speaking so slowly that other people could have noticed; or the opposite being so fidgety that others notice -   Thoughts of being better off dead, or hurting yourself in some way -   PHQ 9 Score -   How difficult have these problems made it for you to do your work, take care of your home and get along with others -       Alcohol Risk Screen   Do you average more than 1 drink per night or more than 7 drinks a week:  No    On any one occasion in the past three months have you have had more than 3 drinks containing alcohol:  No        Functional Ability and Level of Safety:   Hearing: Hearing is good. Activities of Daily Living: The home contains: no safety equipment. Patient does total self care     Ambulation: with difficulty, uses a walker     Fall Risk:  Fall Risk Assessment, last 12 mths 10/9/2020   Able to walk? Yes   Fall in past 12 months?  No   Fall with injury? -   Number of falls in past 12 months -   Fall Risk Score -     Abuse Screen:  Patient is not abused       Cognitive Screening   Has your family/caregiver stated any concerns about your memory: yes - waiting for neurology evaluation     Patient Care Team   Patient Care Team:  Fiordaliza Segundo MD as PCP - General (Internal Medicine)  Fiordaliza Segundo MD as PCP - [facility] Empaneled Provider  Corin Pereyra MD (Orthopedic Surgery)  Natasha Krishnamurthy MD (Pain Management)  Rico Cronin RN as Ambulatory Care Manager  Norberto Shearer as Physician Assistant (Psychiatry)    Assessment/Plan   Education and counseling provided:  Are appropriate based on today's review and evaluation  Influenza Vaccine    Diagnoses and all orders for this visit:    1. Age-related osteoporosis without current pathological fracture    2. Forgetfulness    3. HTN (hypertension), benign    4. Dyslipidemia    5. Severe episode of recurrent major depressive disorder, without psychotic features ([facility] [address]    6. Morbid obesity ([facility] [address]    7. Medicare annual wellness visit, subsequent    8. Screening for alcoholism  -     MT ANNUAL ALCOHOL SCREEN 15 MIN    9. Dysuria  -     CULTURE, URINE; Future    10.  Stress incontinence of urine        Health Maintenance Due   Topic Date Due    Shingrix Vaccine Age 49> (1 of 2) 01/31/2000    Flu Vaccine (1) 09/01/2020    Medicare Yearly Exam  09/20/2020

## 2020-10-09 NOTE — PROGRESS NOTES
Valdemar Godfrey presents today for medicare wellness, lab results   - Is someone accompanying this pt? no  - Is the patient using any durable medical equipment during office visit? joanna    Coordination of Care:  1. Have you been to the ER, urgent care clinic since your last visit? Hospitalized since your last visit? No     2. Have you seen or consulted any other health care providers outside of the [address] [place] [address] since your last visit? Include any pap smears or colon screening.  No

## 2020-10-09 NOTE — PATIENT INSTRUCTIONS
Medicare Wellness Visit, Female The best way to live healthy is to have a lifestyle where you eat a well-balanced diet, exercise regularly, limit alcohol use, and quit all forms of tobacco/nicotine, if applicable. Regular preventive services are another way to keep healthy. Preventive services (vaccines, screening tests, monitoring & exams) can help personalize your care plan, which helps you manage your own care. Screening tests can find health problems at the earliest stages, when they are easiest to treat. [facility] follows the current, evidence-based guidelines published by the [facility] Agui[facility] ([facility]) when recommending preventive services for our patients. Because we follow these guidelines, sometimes recommendations change over time as research supports it. (For example, mammograms used to be recommended annually. Even though Medicare will still pay for an annual mammogram, the newer guidelines recommend a mammogram every two years for women of average risk). Of course, you and your doctor may decide to screen more often for some diseases, based on your risk and your co-morbidities (chronic disease you are already diagnosed with). Preventive services for you include: - Medicare offers their members a free annual wellness visit, which is time for you and your primary care provider to discuss and plan for your preventive service needs. Take advantage of this benefit every year! 
-All adults over the age of 72 should receive the recommended pneumonia vaccines. Current USPSTF guidelines recommend a series of two vaccines for the best pneumonia protection.  
-All adults should have a flu vaccine yearly and a tetanus vaccine every 10 years.  
-All adults age 48 and older should receive the shingles vaccines (series of two vaccines). -All adults age 38-68 who are overweight should have a diabetes screening test once every three years. -All adults born between [birth year] and 1965 should be screened once for Hepatitis C. 
-Other screening tests and preventive services for persons with diabetes include: an eye exam to screen for diabetic retinopathy, a kidney function test, a foot exam, and stricter control over your cholesterol.  
-Cardiovascular screening for adults with routine risk involves an electrocardiogram (ECG) at intervals determined by your doctor.  
-Colorectal cancer screenings should be done for adults age 54-65 with no increased risk factors for colorectal cancer. There are a number of acceptable methods of screening for this type of cancer. Each test has its own benefits and drawbacks. Discuss with your doctor what is most appropriate for you during your annual wellness visit. The different tests include: colonoscopy (considered the best screening method), a fecal occult blood test, a fecal DNA test, and sigmoidoscopy. 
 
-A bone mass density test is recommended when a woman turns 65 to screen for osteoporosis. This test is only recommended one time, as a screening. Some providers will use this same test as a disease monitoring tool if you already have osteoporosis. -Breast cancer screenings are recommended every other year for women of normal risk, age 54-69. 
-Cervical cancer screenings for women over age 72 are only recommended with certain risk factors. Here is a list of your current Health Maintenance items (your personalized list of preventive services) with a due date: 
Health Maintenance Due Topic Date Due  Shingles Vaccine (1 of 2) 01/31/2000  Yearly Flu Vaccine (1) 09/01/2020 [address] [place] [address] Annual Well Visit  09/20/2020

## 2020-10-12 LAB
BACTERIA SPEC CULT: ABNORMAL
CC UR VC: ABNORMAL
SERVICE CMNT-IMP: ABNORMAL

## 2020-10-30 DIAGNOSIS — R41.3 MEMORY DIFFICULTIES: ICD-10-CM

## 2020-10-30 RX ORDER — DONEPEZIL HYDROCHLORIDE 5 MG/1
TABLET, FILM COATED ORAL
Qty: 90 TAB | Refills: 0 | Status: SHIPPED | OUTPATIENT
Start: 2020-10-30 | End: 2020-11-30

## 2020-11-21 DIAGNOSIS — I10 ESSENTIAL HYPERTENSION: ICD-10-CM

## 2020-11-23 ENCOUNTER — TELEPHONE (OUTPATIENT)
Dept: FAMILY MEDICINE CLINIC | Age: 70
End: 2020-11-23

## 2020-11-23 RX ORDER — LOSARTAN POTASSIUM 25 MG/1
TABLET ORAL
Qty: 90 TAB | Refills: 0 | Status: SHIPPED | OUTPATIENT
Start: 2020-11-23 | End: 2020-11-27

## 2020-11-23 NOTE — TELEPHONE ENCOUNTER
Received call from Dr. Tanja Beach, pt currently in office with low pulse, per Dr. Huerta Borne may be the cause but she wanted to review it with provider before sending patient to ER or completing an EKG. At this time, patient PCP is not in office and unavailable. Nurse will attempt to route call to available provider. Dr. Tanja Beach [phone].

## 2020-11-23 NOTE — TELEPHONE ENCOUNTER
I called back just now and spoke with Dr. Thaddeus Peraza. She already sent patient to urgent care for heart rate of 48.

## 2020-11-25 DIAGNOSIS — I10 ESSENTIAL HYPERTENSION: ICD-10-CM

## 2020-11-27 DIAGNOSIS — I10 ESSENTIAL HYPERTENSION: ICD-10-CM

## 2020-11-27 DIAGNOSIS — R41.3 MEMORY DIFFICULTIES: ICD-10-CM

## 2020-11-27 RX ORDER — LOSARTAN POTASSIUM 25 MG/1
TABLET ORAL
Qty: 90 TAB | Refills: 0 | Status: SHIPPED | OUTPATIENT
Start: 2020-11-27 | End: 2020-11-29 | Stop reason: SDUPTHER

## 2020-11-29 RX ORDER — LOSARTAN POTASSIUM 25 MG/1
TABLET ORAL
Qty: 90 TAB | Refills: 0 | OUTPATIENT
Start: 2020-11-29

## 2020-11-29 RX ORDER — LOSARTAN POTASSIUM 25 MG/1
25 TABLET ORAL DAILY
Qty: 90 TAB | Refills: 2 | Status: SHIPPED | OUTPATIENT
Start: 2020-11-29 | End: 2021-04-19 | Stop reason: ALTCHOICE

## 2020-11-30 RX ORDER — DONEPEZIL HYDROCHLORIDE 5 MG/1
TABLET, FILM COATED ORAL
Qty: 90 TAB | Refills: 0 | Status: SHIPPED | OUTPATIENT
Start: 2020-11-30 | End: 2021-01-27

## 2020-12-16 DIAGNOSIS — I10 HTN (HYPERTENSION), BENIGN: ICD-10-CM

## 2020-12-16 RX ORDER — FUROSEMIDE 40 MG/1
TABLET ORAL
Qty: 90 TAB | Refills: 0 | Status: SHIPPED | OUTPATIENT
Start: 2020-12-16 | End: 2021-04-19 | Stop reason: SDUPTHER

## 2021-01-08 ENCOUNTER — OFFICE VISIT (OUTPATIENT)
Dept: INTERNAL MEDICINE CLINIC | Age: 71
End: 2021-01-08
Payer: MEDICARE

## 2021-01-08 VITALS
SYSTOLIC BLOOD PRESSURE: 138 MMHG | RESPIRATION RATE: 18 BRPM | OXYGEN SATURATION: 97 % | HEIGHT: 61 IN | DIASTOLIC BLOOD PRESSURE: 79 MMHG | TEMPERATURE: 97.1 F | WEIGHT: 196 LBS | HEART RATE: 60 BPM | BODY MASS INDEX: 37 KG/M2

## 2021-01-08 DIAGNOSIS — R41.3 MEMORY LOSS: ICD-10-CM

## 2021-01-08 DIAGNOSIS — R30.0 DYSURIA: ICD-10-CM

## 2021-01-08 DIAGNOSIS — M54.6 CHRONIC MIDLINE THORACIC BACK PAIN: Primary | ICD-10-CM

## 2021-01-08 DIAGNOSIS — E78.5 HYPERLIPIDEMIA, UNSPECIFIED HYPERLIPIDEMIA TYPE: ICD-10-CM

## 2021-01-08 DIAGNOSIS — N39.0 RECURRENT UTI: ICD-10-CM

## 2021-01-08 DIAGNOSIS — R35.0 FREQUENT URINATION: ICD-10-CM

## 2021-01-08 DIAGNOSIS — G89.29 CHRONIC MIDLINE THORACIC BACK PAIN: Primary | ICD-10-CM

## 2021-01-08 PROCEDURE — G9717 DOC PT DX DEP/BP F/U NT REQ: HCPCS | Performed by: PHYSICIAN ASSISTANT

## 2021-01-08 PROCEDURE — G8752 SYS BP LESS 140: HCPCS | Performed by: PHYSICIAN ASSISTANT

## 2021-01-08 PROCEDURE — G8417 CALC BMI ABV UP PARAM F/U: HCPCS | Performed by: PHYSICIAN ASSISTANT

## 2021-01-08 PROCEDURE — G8754 DIAS BP LESS 90: HCPCS | Performed by: PHYSICIAN ASSISTANT

## 2021-01-08 PROCEDURE — G8427 DOCREV CUR MEDS BY ELIG CLIN: HCPCS | Performed by: PHYSICIAN ASSISTANT

## 2021-01-08 PROCEDURE — 99214 OFFICE O/P EST MOD 30 MIN: CPT | Performed by: PHYSICIAN ASSISTANT

## 2021-01-08 PROCEDURE — 1101F PT FALLS ASSESS-DOCD LE1/YR: CPT | Performed by: PHYSICIAN ASSISTANT

## 2021-01-08 PROCEDURE — 3017F COLORECTAL CA SCREEN DOC REV: CPT | Performed by: PHYSICIAN ASSISTANT

## 2021-01-08 PROCEDURE — G8536 NO DOC ELDER MAL SCRN: HCPCS | Performed by: PHYSICIAN ASSISTANT

## 2021-01-08 PROCEDURE — 1090F PRES/ABSN URINE INCON ASSESS: CPT | Performed by: PHYSICIAN ASSISTANT

## 2021-01-08 RX ORDER — DICLOFENAC SODIUM 10 MG/G
4 GEL TOPICAL 4 TIMES DAILY
Qty: 500 G | Refills: 2 | Status: SHIPPED | OUTPATIENT
Start: 2021-01-08

## 2021-01-08 RX ORDER — CEPHALEXIN 500 MG/1
500 CAPSULE ORAL 2 TIMES DAILY
COMMUNITY
End: 2021-01-27

## 2021-01-08 NOTE — PROGRESS NOTES
HISTORY OF PRESENT ILLNESS  Sosa Morejon is a 79 y.o. female. HPI  Presents to establish care. 1) Memory loss - recently evaluated by neurology (Dr. Jacob Gilmore). Needing consultation note. - Completed MRI head 12/15/2020. Upcoming f/u appt 1/22/21. She believes she is taking her Lipitor 10 mg nightly. MR HEAD W/O CONTRAST (12/15/2020 4:55 PM EST)  MR HEAD W/O CONTRAST (12/15/2020 4:55 PM EST)   Specimen         MR HEAD W/O CONTRAST (12/15/2020 4:55 PM EST)   Impressions Performed At   1. No acute intracranial abnormalities.        2. Moderate burden of cerebral white matter and pontine signal abnormalities most likely reflecting chronic microvascular ischemic disease.        3. Several chronic lacunar strokes as detailed above.        4. Few chronic hypertensive microbleeds in the vladimir.            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   - See 9/12/19 ID note - April Juani Lobo NP  - Completed IV Vanc on 9/9/19. Suppressive Abx therapy initiated given retained hardware. Placed on Keflex 500 mg BID. 2/5/2020 note - patient seen for f/u UTI and was advised of prn f/u.     3) Lymphedema - daily furosemide. 4) Psychiatrist - Zoloft    5) Recurrent UTI - f/u urology appt 1/27/21. 6) HTN - controlled. 7) HLD - believes she is taking Lipitor 10 mg daily. Lab Results   Component Value Date/Time    Cholesterol, total 152 10/01/2020 09:18 AM    HDL Cholesterol 93 (H) 10/01/2020 09:18 AM    LDL, calculated 49 10/01/2020 09:18 AM    VLDL, calculated 10 10/01/2020 09:18 AM    Triglyceride 50 10/01/2020 09:18 AM    CHOL/HDL Ratio 1.6 10/01/2020 09:18 AM     8) Chronic mid-low back pain - c/o chronic back pain. It is daily and has been present x months. Denies hx of injury or trauma. Remote hx of back injections years ago. Remote hx of PT in the past that she reports actually made her pain worse.    - She takes Ibuprofen when pain is most severe, ~twice/week, which helps. - Hx of T-spine x-ray 4/7/14 - significant for multilevel deg disc dz, mild S-shaped curvature of the thoracolumbar spine. 9) Recurrent UTI - c/o dysuria and frequency. This is not new or changed. She is following with urology and has a f/u appt later this month. She is taking Enablex but denies any improvement of her symptoms with this. Unsure regarding Lipitor and Aricept. Believes she is taking Lipitor. Current Outpatient Medications   Medication Sig Dispense Refill    cephALEXin (KEFLEX) 500 mg capsule Take 500 mg by mouth four (4) times daily.  furosemide (LASIX) 40 mg tablet Take 1 tablet by mouth once daily 90 Tab 0    losartan (COZAAR) 25 mg tablet Take 1 Tab by mouth daily. 90 Tab 2    darifenacin (ENABLEX) 7.5 mg ER tablet Take 1 Tab by mouth daily for 120 days. 30 Tab 3    cefdinir (OMNICEF) 300 mg capsule Take 1 Cap by mouth two (2) times a day. 14 Cap 0    sertraline (ZOLOFT) 100 mg tablet TAKE 1 TABLET BY MOUTH ONCE DAILY      conjugated estrogens (PREMARIN) 0.625 mg/gram vaginal cream Insert 1 g into vagina daily. Place 1 small pea size amount of Premarin on tip of the finger and apply to the outside of urethra x 3 days. 30 g 1    ferrous sulfate (IRON) 325 mg (65 mg iron) cpER Take  by mouth.  donepeziL (ARICEPT) 5 mg tablet Take 1 tablet by mouth nightly 90 Tab 0    atorvastatin (LIPITOR) 10 mg tablet Take 1 tablet by mouth once daily 90 Tab 4         Review of Systems   Psychiatric/Behavioral: Positive for memory loss. Visit Vitals  /79 (BP 1 Location: Right arm, BP Patient Position: Sitting)   Pulse 60   Temp 97.1 °F (36.2 °C) (Oral)   Resp 18   Ht 5' 1\" (1.549 m)   Wt 196 lb (88.9 kg)   SpO2 97%   BMI 37.03 kg/m²       Physical Exam  Constitutional:       General: She is not in acute distress. Appearance: Normal appearance. She is well-developed. She is obese. HENT:      Head: Normocephalic and atraumatic.       Right Ear: Tympanic membrane, ear canal and external ear normal.      Left Ear: Tympanic membrane, ear canal and external ear normal.      Nose: Nose normal.      Mouth/Throat:      Comments: Mask  Eyes:      General: No scleral icterus. Conjunctiva/sclera: Conjunctivae normal.      Pupils: Pupils are equal, round, and reactive to light. Neck:      Musculoskeletal: Neck supple. Cardiovascular:      Rate and Rhythm: Normal rate and regular rhythm. Pulses: Normal pulses. Dorsalis pedis pulses are 2+ on the right side and 2+ on the left side. Posterior tibial pulses are 2+ on the right side and 2+ on the left side. Heart sounds: Normal heart sounds. No murmur. No gallop. Pulmonary:      Effort: Pulmonary effort is normal. No respiratory distress. Breath sounds: Normal breath sounds. No decreased breath sounds, wheezing, rhonchi or rales. Musculoskeletal:      Thoracic back: She exhibits tenderness and bony tenderness. Back:       Right lower leg: No edema. Left lower leg: No edema. Comments: Just below patient's bra line, she is acutely TTP along the spine and paraspinals at a single level. Lateral musculature nontender to palpation. Lumbar spine nontender. Lymphadenopathy:      Head:      Right side of head: No submandibular or tonsillar adenopathy. Left side of head: No submandibular or tonsillar adenopathy. Cervical: No cervical adenopathy. Upper Body:      Right upper body: No supraclavicular adenopathy. Left upper body: No supraclavicular adenopathy. Skin:     General: Skin is warm and dry. Neurological:      Mental Status: She is alert and oriented to person, place, and time. Psychiatric:         Speech: Speech normal.         ASSESSMENT and PLAN  Diagnoses and all orders for this visit:    1. Chronic midline thoracic back pain  -     diclofenac (VOLTAREN) 1 % gel;  Apply 4 g to affected area four (4) times daily.   - Trial.   - Can take OTC Tylenol BID-TID prn pain. - Advised we may need to reconsider PT as it seems she has paraspinal hypertonicity, and PT could be helpful with this. 2. Dysuria  3. Frequent urination  4. Recurrent UTI  -     URINALYSIS W/ RFLX MICROSCOPIC; Future  -     CULTURE, URINE; Future    5. Hyperlipidemia, unspecified hyperlipidemia type  - Needs to be taking her statin. She believes she is, but she will confirm. 6. Memory loss  - Needing neurology record from Dr. Carissa Andrade. Request sent. Follow-up and Dispositions    · Return in about 3 months (around 4/8/2021) for f/u chronic conditions.

## 2021-01-08 NOTE — PROGRESS NOTES
Brenda Ann is a 79 y.o. female (: 1950) presenting to address:    Chief Complaint   Patient presents with   [address] Patient    Establish Care       Vitals:    21 0933   BP: 138/79   Pulse: 60   Resp: 18   Temp: 97.1 °F (36.2 °C)   TempSrc: Oral   SpO2: 97%   Weight: 196 lb (88.9 kg)   Height: 5' 1\" (1.549 m)   PainSc:   0 - No pain       Hearing/Vision:   No exam data present    Learning Assessment:     Learning Assessment 2019   PRIMARY LEARNER Patient   HIGHEST LEVEL OF EDUCATION - PRIMARY LEARNER  GRADUATED HIGH SCHOOL OR GED   BARRIERS PRIMARY LEARNER NONE   CO-LEARNER CAREGIVER No   PRIMARY LANGUAGE ENGLISH   LEARNER PREFERENCE PRIMARY LISTENING   ANSWERED BY patient   RELATIONSHIP SELF     Depression Screening:     3 most recent PHQ Screens 10/9/2020   Little interest or pleasure in doing things Not at all   Feeling down, depressed, irritable, or hopeless Not at all   Total Score PHQ 2 0   Trouble falling or staying asleep, or sleeping too much -   Feeling tired or having little energy -   Poor appetite, weight loss, or overeating -   Feeling bad about yourself - or that you are a failure or have let yourself or your family down -   Trouble concentrating on things such as school, work, reading, or watching TV -   Moving or speaking so slowly that other people could have noticed; or the opposite being so fidgety that others notice -   Thoughts of being better off dead, or hurting yourself in some way -   PHQ 9 Score -   How difficult have these problems made it for you to do your work, take care of your home and get along with others -     Fall Risk Assessment:     Fall Risk Assessment, last 12 mths 2021   Able to walk? Yes   Fall in past 12 months? 0   Do you feel unsteady? 1   Are you worried about falling 1   Number of falls in past 12 months -   Fall with injury? -     Abuse Screening:     Abuse Screening Questionnaire 2019   Do you ever feel afraid of your partner?  N   Are you in a relationship with someone who physically or mentally threatens you? N   Is it safe for you to go home? Y     Coordination of Care Questionaire:   1. Have you been to the ER, urgent care clinic since your last visit? Hospitalized since your last visit? NO    2. Have you seen or consulted any other health care providers outside of the [address] [place] [address] since your last visit? Include any pap smears or colon screening. NO    Advanced Directive:   1. Do you have an Advanced Directive? NO    2. Would you like information on Advanced Directives?  NO

## 2021-01-08 NOTE — PATIENT INSTRUCTIONS
[facility] 
[phone] 
 
Make sure you are taking Atorvastatin 10 mg daily.

## 2021-01-14 LAB
APPEARANCE UR: CLEAR
BACTERIA #/AREA URNS HPF: ABNORMAL /[HPF]
BACTERIA UR CULT: ABNORMAL
BILIRUB UR QL STRIP: NEGATIVE
CASTS URNS QL MICRO: ABNORMAL /LPF
COLOR UR: YELLOW
EPI CELLS #/AREA URNS HPF: >10 /HPF (ref 0–10)
GLUCOSE UR QL: NEGATIVE
HGB UR QL STRIP: NEGATIVE
KETONES UR QL STRIP: NEGATIVE
LEUKOCYTE ESTERASE UR QL STRIP: ABNORMAL
MICRO URNS: ABNORMAL
MUCOUS THREADS URNS QL MICRO: PRESENT
NITRITE UR QL STRIP: POSITIVE
PH UR STRIP: 5.5 [PH] (ref 5–7.5)
PROT UR QL STRIP: NEGATIVE
RBC #/AREA URNS HPF: ABNORMAL /HPF (ref 0–2)
SP GR UR: 1.02 (ref 1–1.03)
UROBILINOGEN UR STRIP-MCNC: 0.2 MG/DL (ref 0.2–1)
WBC #/AREA URNS HPF: >30 /HPF (ref 0–5)

## 2021-01-15 RX ORDER — DOXYCYCLINE 100 MG/1
100 TABLET ORAL 2 TIMES DAILY
Qty: 14 TAB | Refills: 0 | Status: SHIPPED | OUTPATIENT
Start: 2021-01-15 | End: 2021-01-22

## 2021-01-15 NOTE — PROGRESS NOTES
Unable to contact patient by phone ( busy signal )  I called caregiver and told her to have patient to call office.

## 2021-01-15 NOTE — PROGRESS NOTES
Please notify patient that her urine did test positive for a UTI. I am sending a prescription for Doxycycline to her pharmacy. Take it twice daily x 7 days.

## 2021-04-19 ENCOUNTER — OFFICE VISIT (OUTPATIENT)
Dept: INTERNAL MEDICINE CLINIC | Age: 71
End: 2021-04-19
Payer: MEDICARE

## 2021-04-19 VITALS
RESPIRATION RATE: 18 BRPM | OXYGEN SATURATION: 100 % | TEMPERATURE: 98 F | HEART RATE: 62 BPM | BODY MASS INDEX: 38.68 KG/M2 | HEIGHT: 60 IN | SYSTOLIC BLOOD PRESSURE: 94 MMHG | WEIGHT: 197 LBS | DIASTOLIC BLOOD PRESSURE: 62 MMHG

## 2021-04-19 DIAGNOSIS — E78.00 HYPERCHOLESTEREMIA: ICD-10-CM

## 2021-04-19 DIAGNOSIS — E66.01 MORBID OBESITY (HCC): ICD-10-CM

## 2021-04-19 DIAGNOSIS — I10 ESSENTIAL HYPERTENSION: ICD-10-CM

## 2021-04-19 DIAGNOSIS — E55.9 VITAMIN D DEFICIENCY: ICD-10-CM

## 2021-04-19 DIAGNOSIS — Z86.39 HISTORY OF IRON DEFICIENCY: ICD-10-CM

## 2021-04-19 DIAGNOSIS — I95.9 HYPOTENSION, UNSPECIFIED HYPOTENSION TYPE: Primary | ICD-10-CM

## 2021-04-19 DIAGNOSIS — F33.2 SEVERE EPISODE OF RECURRENT MAJOR DEPRESSIVE DISORDER, WITHOUT PSYCHOTIC FEATURES (HCC): ICD-10-CM

## 2021-04-19 DIAGNOSIS — F03.90 DEMENTIA WITHOUT BEHAVIORAL DISTURBANCE, UNSPECIFIED DEMENTIA TYPE: ICD-10-CM

## 2021-04-19 DIAGNOSIS — I89.0 LYMPHEDEMA: ICD-10-CM

## 2021-04-19 PROCEDURE — 1101F PT FALLS ASSESS-DOCD LE1/YR: CPT | Performed by: PHYSICIAN ASSISTANT

## 2021-04-19 PROCEDURE — G8754 DIAS BP LESS 90: HCPCS | Performed by: PHYSICIAN ASSISTANT

## 2021-04-19 PROCEDURE — 1090F PRES/ABSN URINE INCON ASSESS: CPT | Performed by: PHYSICIAN ASSISTANT

## 2021-04-19 PROCEDURE — G9717 DOC PT DX DEP/BP F/U NT REQ: HCPCS | Performed by: PHYSICIAN ASSISTANT

## 2021-04-19 PROCEDURE — 99214 OFFICE O/P EST MOD 30 MIN: CPT | Performed by: PHYSICIAN ASSISTANT

## 2021-04-19 PROCEDURE — G9899 SCRN MAM PERF RSLTS DOC: HCPCS | Performed by: PHYSICIAN ASSISTANT

## 2021-04-19 PROCEDURE — 3017F COLORECTAL CA SCREEN DOC REV: CPT | Performed by: PHYSICIAN ASSISTANT

## 2021-04-19 PROCEDURE — G8427 DOCREV CUR MEDS BY ELIG CLIN: HCPCS | Performed by: PHYSICIAN ASSISTANT

## 2021-04-19 PROCEDURE — G8536 NO DOC ELDER MAL SCRN: HCPCS | Performed by: PHYSICIAN ASSISTANT

## 2021-04-19 PROCEDURE — G8417 CALC BMI ABV UP PARAM F/U: HCPCS | Performed by: PHYSICIAN ASSISTANT

## 2021-04-19 PROCEDURE — G8752 SYS BP LESS 140: HCPCS | Performed by: PHYSICIAN ASSISTANT

## 2021-04-19 RX ORDER — SERTRALINE HYDROCHLORIDE 100 MG/1
200 TABLET, FILM COATED ORAL DAILY
Qty: 180 TAB | Refills: 3 | Status: SHIPPED | OUTPATIENT
Start: 2021-04-19

## 2021-04-19 RX ORDER — ATORVASTATIN CALCIUM 10 MG/1
TABLET, FILM COATED ORAL
Qty: 90 TAB | Refills: 3 | Status: SHIPPED | OUTPATIENT
Start: 2021-04-19

## 2021-04-19 RX ORDER — FUROSEMIDE 40 MG/1
40 TABLET ORAL DAILY
Qty: 90 TAB | Refills: 3 | Status: SHIPPED | OUTPATIENT
Start: 2021-04-19

## 2021-04-19 NOTE — PROGRESS NOTES
Simone Thakur is a 70 y.o. female (: 1950) presenting to address:    Chief Complaint   Patient presents with    Follow Up Chronic Condition       Vitals:    21 1146   BP: 94/62   Pulse: 62   Resp: 18   Temp: 98 °F (36.7 °C)   SpO2: 100%   Weight: 197 lb (89.4 kg)   Height: 5' (1.524 m)   PainSc:   0 - No pain       Hearing/Vision:   No exam data present    Learning Assessment:     Learning Assessment 2019   PRIMARY LEARNER Patient   HIGHEST LEVEL OF EDUCATION - PRIMARY LEARNER  GRADUATED HIGH SCHOOL OR GED   BARRIERS PRIMARY LEARNER NONE   CO-LEARNER CAREGIVER No   PRIMARY LANGUAGE ENGLISH   LEARNER PREFERENCE PRIMARY LISTENING   ANSWERED BY patient   RELATIONSHIP SELF     Depression Screening:     3 most recent PHQ Screens 10/9/2020   Little interest or pleasure in doing things Not at all   Feeling down, depressed, irritable, or hopeless Not at all   Total Score PHQ 2 0   Trouble falling or staying asleep, or sleeping too much -   Feeling tired or having little energy -   Poor appetite, weight loss, or overeating -   Feeling bad about yourself - or that you are a failure or have let yourself or your family down -   Trouble concentrating on things such as school, work, reading, or watching TV -   Moving or speaking so slowly that other people could have noticed; or the opposite being so fidgety that others notice -   Thoughts of being better off dead, or hurting yourself in some way -   PHQ 9 Score -   How difficult have these problems made it for you to do your work, take care of your home and get along with others -     Fall Risk Assessment:     Fall Risk Assessment, last 12 mths 2021   Able to walk? Yes   Fall in past 12 months? 0   Do you feel unsteady? -   Are you worried about falling -   Number of falls in past 12 months -   Fall with injury? -     Abuse Screening:     Abuse Screening Questionnaire 2019   Do you ever feel afraid of your partner?  N   Are you in a relationship with someone who physically or mentally threatens you? N   Is it safe for you to go home? Y     Coordination of Care Questionaire:   1. Have you been to the ER, urgent care clinic since your last visit? Hospitalized since your last visit? NO    2. Have you seen or consulted any other health care providers outside of the [address] [place] [address] since your last visit? Include any pap smears or colon screening. YES    Advanced Directive:   1. Do you have an Advanced Directive? NO    2. Would you like information on Advanced Directives?  NO

## 2021-04-19 NOTE — PROGRESS NOTES
HISTORY OF PRESENT ILLNESS  Manuel Smart is a 70 y.o. female. HPI  Routine f/u. She presents with a friend who helps with her care. Her dementia seems to be worsening, she has no family to help with her care, and long-term care facilities are being considered. 1) Hypotension - BP low today. Patient admits to some chronic dizziness as well that seems to be worsening. Denies a trigger of head movements or position changes. Denies orthostasis. - Stopped iron previously. Friend and patient are curious about possible deficiency. Desire recheck. 2) Recurrent UTI - following with urology. Upcoming urodynamics studies. Admits to some hematuria, worsening. 3) Dementia - recent diagnosis. Dr. Jaky Moore.   - Deveron Diver was recently checked by neurology. She was advised to start OTC B12. Friend believes thyroid was ordered as well. - Mental status score reportedly 17/30 per patient's friend. - Care everywhere labs reviewed. Thyroid negative. 4) FUNEZ - with getting out of bed or with walking.   - Hx of echo 7/2019. LV systolic function is normal.   LVEF 62%. 5) Chronic depression - she had been on Cymbalta for years, and psychiatry changed this to Zoloft. She is taking 200 mg daily. She is doing better with this. She and her friend would like for me to assume care of this medication due to frequent appts. They will discuss with psychiatry as well. Estil Angelucci, PA    6) Lymphedema - has a pump as well but admits needs to use it more. Lasix. Denies significant change. Review of Systems   Respiratory: Positive for shortness of breath (mild FUNEZ). Cardiovascular: Positive for leg swelling (chronic unchanged - lymphedema). Negative for chest pain, orthopnea and PND. Neurological: Positive for dizziness (see hpi). Negative for headaches.      Visit Vitals  BP 94/62 (BP 1 Location: Right upper arm, BP Patient Position: Sitting, BP Cuff Size: Large adult)   Pulse 62   Temp 98 °F (36.7 °C) Resp 18   Ht 5' (1.524 m)   Wt 197 lb (89.4 kg)   SpO2 100%   BMI 38.47 kg/m²     Wt Readings from Last 3 Encounters:   04/19/21 197 lb (89.4 kg)   04/15/21 193 lb (87.5 kg)   03/25/21 193 lb (87.5 kg)     BP Readings from Last 3 Encounters:   04/19/21 94/62   01/08/21 138/79   10/09/20 102/64         Physical Exam  Constitutional:       General: She is not in acute distress. Appearance: Normal appearance. She is well-developed. HENT:      Head: Normocephalic and atraumatic. Right Ear: Tympanic membrane, ear canal and external ear normal.      Left Ear: Tympanic membrane, ear canal and external ear normal.      Nose: Nose normal.      Mouth/Throat:      Comments: Mask  Eyes:      General: No scleral icterus. Conjunctiva/sclera: Conjunctivae normal.      Pupils: Pupils are equal, round, and reactive to light. Neck:      Musculoskeletal: Neck supple. Cardiovascular:      Rate and Rhythm: Normal rate and regular rhythm. Pulses: Normal pulses. Dorsalis pedis pulses are 2+ on the right side and 2+ on the left side. Posterior tibial pulses are 2+ on the right side and 2+ on the left side. Heart sounds: Normal heart sounds. No murmur. No gallop. Comments: Calderon LE lymphedema. No pitting edema. Pulmonary:      Effort: Pulmonary effort is normal. No respiratory distress. Breath sounds: Normal breath sounds. No decreased breath sounds, wheezing, rhonchi or rales. Musculoskeletal:      Right lower leg: Edema present. Left lower leg: Edema present. Lymphadenopathy:      Head:      Right side of head: No submandibular or tonsillar adenopathy. Left side of head: No submandibular or tonsillar adenopathy. Cervical: No cervical adenopathy. Upper Body:      Right upper body: No supraclavicular adenopathy. Left upper body: No supraclavicular adenopathy. Skin:     General: Skin is warm and dry.    Neurological:      Mental Status: She is alert and oriented to person, place, and time. Psychiatric:         Speech: Speech normal.         ASSESSMENT and PLAN  Diagnoses and all orders for this visit:    1. Hypotension, unspecified hypotension type  2. Essential hypertension  -     CBC WITH AUTOMATED DIFF; Future  -     METABOLIC PANEL, COMPREHENSIVE; Future  - Hx of HTN, but BP low and hx of low BP at previous visit. Recommend d/c of Losartan 25 mg daily. 3. Lymphedema  -     furosemide (LASIX) 40 mg tablet; Take 1 Tab by mouth daily. - Cont at present. 4. Severe episode of recurrent major depressive disorder, without psychotic features ([facility] [address]  -     sertraline (ZOLOFT) 100 mg tablet; Take 2 Tabs by mouth daily.  - I am agreeable to prescribing this for patient. She will discuss with her psychiatry clinic. She will return if her symptoms change or her control declines. 5. Morbid obesity ([facility] [address]    6. History of iron deficiency  -     FERRITIN; Future  -     IRON PROFILE; Future    7. Vitamin D deficiency  -     VITAMIN D, 25 HYDROXY; Future    8. Dementia without behavioral disturbance, unspecified dementia type ([facility] [address]  -     VITAMIN B12; Future    9. Hypercholesteremia  -     atorvastatin (LIPITOR) 10 mg tablet; Take 1 tablet by mouth once daily        Follow-up and Dispositions    · Return in about 3 months (around 7/19/2021) for f/u chronic conditions - 30 min.

## 2021-04-20 LAB
25(OH)D3+25(OH)D2 SERPL-MCNC: 17.1 NG/ML (ref 30–100)
ALBUMIN SERPL-MCNC: 4.4 G/DL (ref 3.7–4.7)
ALBUMIN/GLOB SERPL: 2.2 {RATIO} (ref 1.2–2.2)
ALP SERPL-CCNC: 105 IU/L (ref 39–117)
ALT SERPL-CCNC: 13 IU/L (ref 0–32)
AST SERPL-CCNC: 19 IU/L (ref 0–40)
BASOPHILS # BLD AUTO: 0 X10E3/UL (ref 0–0.2)
BASOPHILS NFR BLD AUTO: 1 %
BILIRUB SERPL-MCNC: 0.8 MG/DL (ref 0–1.2)
BUN SERPL-MCNC: 16 MG/DL (ref 8–27)
BUN/CREAT SERPL: 22 (ref 12–28)
CALCIUM SERPL-MCNC: 9.5 MG/DL (ref 8.7–10.3)
CHLORIDE SERPL-SCNC: 108 MMOL/L (ref 96–106)
CO2 SERPL-SCNC: 26 MMOL/L (ref 20–29)
CREAT SERPL-MCNC: 0.74 MG/DL (ref 0.57–1)
EOSINOPHIL # BLD AUTO: 0.1 X10E3/UL (ref 0–0.4)
EOSINOPHIL NFR BLD AUTO: 1 %
ERYTHROCYTE [DISTWIDTH] IN BLOOD BY AUTOMATED COUNT: 13.1 % (ref 11.7–15.4)
FERRITIN SERPL-MCNC: 295 NG/ML (ref 15–150)
GLOBULIN SER CALC-MCNC: 2 G/DL (ref 1.5–4.5)
GLUCOSE SERPL-MCNC: 66 MG/DL (ref 65–99)
HCT VFR BLD AUTO: 33.1 % (ref 34–46.6)
HGB BLD-MCNC: 12 G/DL (ref 11.1–15.9)
IMM GRANULOCYTES # BLD AUTO: 0 X10E3/UL (ref 0–0.1)
IMM GRANULOCYTES NFR BLD AUTO: 0 %
IRON SATN MFR SERPL: 31 % (ref 15–55)
IRON SERPL-MCNC: 83 UG/DL (ref 27–139)
LYMPHOCYTES # BLD AUTO: 1.1 X10E3/UL (ref 0.7–3.1)
LYMPHOCYTES NFR BLD AUTO: 21 %
MCH RBC QN AUTO: 33.6 PG (ref 26.6–33)
MCHC RBC AUTO-ENTMCNC: 36.3 G/DL (ref 31.5–35.7)
MCV RBC AUTO: 93 FL (ref 79–97)
MONOCYTES # BLD AUTO: 0.5 X10E3/UL (ref 0.1–0.9)
MONOCYTES NFR BLD AUTO: 10 %
NEUTROPHILS # BLD AUTO: 3.3 X10E3/UL (ref 1.4–7)
NEUTROPHILS NFR BLD AUTO: 67 %
PLATELET # BLD AUTO: 157 X10E3/UL (ref 150–450)
POTASSIUM SERPL-SCNC: 4 MMOL/L (ref 3.5–5.2)
PROT SERPL-MCNC: 6.4 G/DL (ref 6–8.5)
RBC # BLD AUTO: 3.57 X10E6/UL (ref 3.77–5.28)
SODIUM SERPL-SCNC: 148 MMOL/L (ref 134–144)
TIBC SERPL-MCNC: 268 UG/DL (ref 250–450)
UIBC SERPL-MCNC: 185 UG/DL (ref 118–369)
VIT B12 SERPL-MCNC: 364 PG/ML (ref 232–1245)
WBC # BLD AUTO: 5 X10E3/UL (ref 3.4–10.8)

## 2021-04-21 RX ORDER — ERGOCALCIFEROL 1.25 MG/1
50000 CAPSULE ORAL
Qty: 12 CAP | Refills: 1 | Status: SHIPPED | OUTPATIENT
Start: 2021-04-21

## 2021-07-22 ENCOUNTER — OFFICE VISIT (OUTPATIENT)
Dept: INTERNAL MEDICINE CLINIC | Age: 71
End: 2021-07-22
Payer: MEDICARE

## 2021-07-22 VITALS
SYSTOLIC BLOOD PRESSURE: 119 MMHG | DIASTOLIC BLOOD PRESSURE: 49 MMHG | RESPIRATION RATE: 18 BRPM | BODY MASS INDEX: 37.5 KG/M2 | WEIGHT: 191 LBS | OXYGEN SATURATION: 97 % | HEART RATE: 73 BPM | HEIGHT: 60 IN

## 2021-07-22 DIAGNOSIS — Z11.1 ENCOUNTER FOR PPD TEST: ICD-10-CM

## 2021-07-22 DIAGNOSIS — E78.00 HYPERCHOLESTEREMIA: ICD-10-CM

## 2021-07-22 DIAGNOSIS — I95.9 HYPOTENSION, UNSPECIFIED HYPOTENSION TYPE: Primary | ICD-10-CM

## 2021-07-22 DIAGNOSIS — I89.0 LYMPHEDEMA: ICD-10-CM

## 2021-07-22 DIAGNOSIS — F03.90 DEMENTIA WITHOUT BEHAVIORAL DISTURBANCE, UNSPECIFIED DEMENTIA TYPE: ICD-10-CM

## 2021-07-22 DIAGNOSIS — F33.2 SEVERE EPISODE OF RECURRENT MAJOR DEPRESSIVE DISORDER, WITHOUT PSYCHOTIC FEATURES (HCC): ICD-10-CM

## 2021-07-22 PROCEDURE — G9899 SCRN MAM PERF RSLTS DOC: HCPCS | Performed by: NURSE PRACTITIONER

## 2021-07-22 PROCEDURE — 99214 OFFICE O/P EST MOD 30 MIN: CPT | Performed by: NURSE PRACTITIONER

## 2021-07-22 PROCEDURE — G9717 DOC PT DX DEP/BP F/U NT REQ: HCPCS | Performed by: NURSE PRACTITIONER

## 2021-07-22 PROCEDURE — G8536 NO DOC ELDER MAL SCRN: HCPCS | Performed by: NURSE PRACTITIONER

## 2021-07-22 PROCEDURE — G8754 DIAS BP LESS 90: HCPCS | Performed by: NURSE PRACTITIONER

## 2021-07-22 PROCEDURE — G8417 CALC BMI ABV UP PARAM F/U: HCPCS | Performed by: NURSE PRACTITIONER

## 2021-07-22 PROCEDURE — 1090F PRES/ABSN URINE INCON ASSESS: CPT | Performed by: NURSE PRACTITIONER

## 2021-07-22 PROCEDURE — G8427 DOCREV CUR MEDS BY ELIG CLIN: HCPCS | Performed by: NURSE PRACTITIONER

## 2021-07-22 PROCEDURE — G8752 SYS BP LESS 140: HCPCS | Performed by: NURSE PRACTITIONER

## 2021-07-22 PROCEDURE — 3017F COLORECTAL CA SCREEN DOC REV: CPT | Performed by: NURSE PRACTITIONER

## 2021-07-22 PROCEDURE — 1101F PT FALLS ASSESS-DOCD LE1/YR: CPT | Performed by: NURSE PRACTITIONER

## 2021-07-22 NOTE — PROGRESS NOTES
ROOM # 2  Identified pt with two pt identifiers(name and ). Reviewed record in preparation for visit and have obtained necessary documentation. Chief Complaint   Patient presents with    Other     Chronic conditions       Sosa Ku preferred language for health care discussion is english/other. Is the patient using any DME equipment during OV? Laura Galeas is due for:  Health Maintenance Due   Topic    Shingrix Vaccine Age 50> (1 of 2)     Health Maintenance reviewed and discussed per provider  Please order/place referral if appropriate. Advance Directive:  1. Do you have an advance directive in place? Patient Reply: NO    2. If not, would you like material regarding how to put one in place? NO    Coordination of Care:  1. Have you been to the ER, urgent care clinic since your last visit? Hospitalized since your last visit? NO    2. Have you seen or consulted any other health care providers outside of the [address] [place] [address] since your last visit? Include any pap smears or colon screening. NO    Patient is accompanied by power of   I have received verbal consent from Lauren Richardson. 18. to discuss any/all medical information while they are present in the room.     Learning Assessment:  Learning Assessment 2016   PRIMARY LEARNER Patient Patient Patient Patient   HIGHEST LEVEL OF EDUCATION - PRIMARY LEARNER  GRADUATED HIGH SCHOOL OR GED - - -   BARRIERS PRIMARY LEARNER NONE - NONE NONE   CO-LEARNER CAREGIVER No - No -   PRIMARY LANGUAGE ENGLISH ENGLISH ENGLISH ENGLISH   LEARNER PREFERENCE PRIMARY LISTENING DEMONSTRATION LISTENING LISTENING   ANSWERED BY patient patient patient patient   RELATIONSHIP SELF SELF SELF SELF     Depression Screening:  3 most recent [facility] 36 Screens 2021 10/9/2020 3/17/2020 2019   Little interest or pleasure in doing things Several days Not at all Nearly every day Not at all   Feeling down, depressed, irritable, or hopeless Several days Not at all Nearly every day Not at all   Total Score PHQ 2 2 0 6 0   Trouble falling or staying asleep, or sleeping too much - - Several days -   Feeling tired or having little energy - - Nearly every day -   Poor appetite, weight loss, or overeating - - Nearly every day -   Feeling bad about yourself - or that you are a failure or have let yourself or your family down - - Nearly every day -   Trouble concentrating on things such as school, work, reading, or watching TV - - More than half the days -   Moving or speaking so slowly that other people could have noticed; or the opposite being so fidgety that others notice - - More than half the days -   Thoughts of being better off dead, or hurting yourself in some way - - Not at all -   PHQ 9 Score - - 20 -   How difficult have these problems made it for you to do your work, take care of your home and get along with others - - Somewhat difficult -     Abuse Screening:  Abuse Screening Questionnaire 7/12/2019 5/16/2019 11/27/2018 1/19/2016 1/12/2015   Do you ever feel afraid of your partner? N N N N N   Are you in a relationship with someone who physically or mentally threatens you? N N N N N   Is it safe for you to go home? Stefanie SOOD     Fall Risk  Fall Risk Assessment, last 12 mths 7/22/2021 4/19/2021 1/8/2021 10/9/2020 3/17/2020 7/12/2019 5/16/2019   Able to walk? Yes Yes Yes Yes Yes Yes Yes   Fall in past 12 months? 0 0 0 No No No No   Do you feel unsteady? - - 1 - - - -   Are you worried about falling - - 1 - - - -   Number of falls in past 12 months - - - - - - -   Fall with injury? - - - - - - -     Recent Travel Screening and Travel History documentation     Travel Screening     Question   Response    In the last month, have you been in contact with someone who was confirmed or suspected to have Coronavirus / COVID-19? No / Unsure    Have you had a COVID-19 viral test in the last 14 days?   No    Do you have any of the following new or worsening symptoms? Have you traveled internationally or domestically in the last month?   No      Travel History   Travel since 06/22/21     No documented travel since 06/22/21

## 2021-07-22 NOTE — PROGRESS NOTES
HISTORY OF PRESENT ILLNESS  Arnaldo Caruso is a 70 y.o. female. Here for F/U on chronic conditions. HPI  She presents with her POA Natalia Maloney  who helps with her care. Her dementia seems to be worsening, she is working on going into [facility] August 2nd. She lives alone and her friends come to her home to help her with her care [place]. They assits with all ADL's she no longer has drivers license due to her neurologist.     1) HTN- was previously taking Losartan 25 mg - stopped last visit due to hypotension. 2) HLD - taking Lipitor 10 mg nightly     3) Dementia - recent diagnosis. Dr. Hao Drake. 4) Chronic depression - taking  Zoloft 200 mg daily. follows with - JASIEL [place]     6) Lymphedema - has a pump as well but admits she has packed this up. Lasix. Denies significant change. BP (!) 119/49 (BP 1 Location: Left upper arm, BP Patient Position: Sitting)   Pulse 73   Resp 18   Ht 5' (1.524 m)   Wt 191 lb (86.6 kg)   SpO2 97%   BMI 37.30 kg/m²   Current Outpatient Medications   Medication Sig Dispense Refill    Myrbetriq 25 mg ER tablet Take 1 tablet by mouth once daily 90 Tablet 0    OTHER,NON-FORMULARY, Please compound vaginal valium suppositories 5mg as needed daily for muscle spasm and pelvic pain #30 with 3 refills 30 Each 2    ergocalciferol (ERGOCALCIFEROL) 1,250 mcg (50,000 unit) capsule Take 1 Cap by mouth every seven (7) days. 12 Cap 1    sertraline (ZOLOFT) 100 mg tablet Take 2 Tabs by mouth daily. 180 Tab 3    furosemide (LASIX) 40 mg tablet Take 1 Tab by mouth daily. 90 Tab 3    atorvastatin (LIPITOR) 10 mg tablet Take 1 tablet by mouth once daily 90 Tab 3    cephALEXin (KEFLEX) 500 mg capsule TAKE 1 CAPSULE BY MOUTH TWICE DAILY      estradioL (Estrace) 0.01 % (0.1 mg/gram) vaginal cream Insert 1 g into vagina every Monday, Wednesday, Friday.  To prevent recurrent UTI - do not use applicator, apply with fingertip 42.5 g 2    diclofenac (VOLTAREN) 1 % gel Apply 4 g to affected area four (4) times daily. 500 g 2    ferrous sulfate (IRON) 325 mg (65 mg iron) cpER Take  by mouth. Review of Systems   Constitutional: Positive for malaise/fatigue. Negative for chills and fever. Eyes: Negative for blurred vision and double vision. Respiratory: Negative for cough, shortness of breath and wheezing. Cardiovascular: Positive for leg swelling. Negative for chest pain and palpitations. Musculoskeletal: Positive for back pain. Neurological: Negative for dizziness and headaches. Psychiatric/Behavioral: Positive for memory loss. Physical Exam  Vitals and nursing note reviewed. Constitutional:       General: She is not in acute distress. Appearance: She is obese. She is not ill-appearing. HENT:      Head: Normocephalic. Right Ear: External ear normal.      Left Ear: External ear normal.      Mouth/Throat:      Comments: MASK  Eyes:      General: No scleral icterus. Extraocular Movements: Extraocular movements intact. Conjunctiva/sclera: Conjunctivae normal.      Pupils: Pupils are equal, round, and reactive to light. Cardiovascular:      Rate and Rhythm: Normal rate and regular rhythm. Pulses: Normal pulses. Heart sounds: Normal heart sounds. Pulmonary:      Effort: Pulmonary effort is normal. No respiratory distress. Breath sounds: Normal breath sounds. No wheezing. Musculoskeletal:      Cervical back: Normal range of motion. Right lower leg: Edema present. Left lower leg: Edema present. Comments: Ataxic gait - assist rolling walker    Lymphadenopathy:      Cervical: No cervical adenopathy. Skin:     General: Skin is warm and dry. Findings: No lesion or rash. Neurological:      General: No focal deficit present. Mental Status: She is alert.    Psychiatric:         Mood and Affect: Mood normal.         Behavior: Behavior normal.         Thought Content: Thought content normal.         ASSESSMENT and PLAN  Diagnoses and all orders for this visit:    1. Hypotension, unspecified hypotension type   - D/C'd losartan - do not restart this her BP is to low for medications   2. Hypercholesteremia   - Continue Lipitor last lab shows controlled   3. Severe episode of recurrent major depressive disorder, without psychotic features ([facility] [address]   - Keep appointments with Psychiatry for medication management. 4. Dementia without behavioral disturbance, unspecified dementia type ([facility] [address]    5. Lymphedema   - Lasix daily - continue compression once it is unpacked     6. Encounter for PPD test  -     AMB POC TUBERCULOSIS, INTRADERMAL (SKIN TEST)      Follow-up and Dispositions    · Return in about 3 months (around 10/22/2021) for F/U chronic conditions .      Paperwork completed for [employer] - she will be looking if house 's accept her insurance and may transition her care to them

## 2021-07-26 ENCOUNTER — CLINICAL SUPPORT (OUTPATIENT)
Dept: INTERNAL MEDICINE CLINIC | Age: 71
End: 2021-07-26
Payer: MEDICARE

## 2021-07-26 DIAGNOSIS — Z11.1 ENCOUNTER FOR PPD TEST: Primary | ICD-10-CM

## 2021-07-26 LAB
MM INDURATION POC: 0 MM (ref 0–5)
PPD POC: NEGATIVE NEGATIVE

## 2021-07-26 PROCEDURE — 86580 TB INTRADERMAL TEST: CPT | Performed by: NURSE PRACTITIONER

## 2021-07-28 NOTE — PROGRESS NOTES
PPD Reading Note  PPD read and results entered in [employer]. Result: 0 mm induration.   Interpretation: Negative  If test not read within 48-72 hours of initial placement, patient advised to repeat in other arm 1-3 weeks after this test.  Allergic reaction: NO

## 2024-05-05 NOTE — TELEPHONE ENCOUNTER
Patient called in and was asking for the refill again and I advised her that Dr. Sabiha Clements was out of the office but the message had been forwarded to him so we were waiting on him to respond. She said it was very important that she get this refilled and I let her know I understood and would let the nurse know. Please advise. Yes